# Patient Record
Sex: MALE | Race: WHITE | NOT HISPANIC OR LATINO | Employment: FULL TIME | URBAN - NONMETROPOLITAN AREA
[De-identification: names, ages, dates, MRNs, and addresses within clinical notes are randomized per-mention and may not be internally consistent; named-entity substitution may affect disease eponyms.]

---

## 2017-01-09 ENCOUNTER — OFFICE VISIT (OUTPATIENT)
Dept: FAMILY MEDICINE CLINIC | Facility: CLINIC | Age: 42
End: 2017-01-09

## 2017-01-09 VITALS
RESPIRATION RATE: 16 BRPM | DIASTOLIC BLOOD PRESSURE: 86 MMHG | BODY MASS INDEX: 37 KG/M2 | WEIGHT: 236.25 LBS | HEART RATE: 73 BPM | SYSTOLIC BLOOD PRESSURE: 134 MMHG | OXYGEN SATURATION: 95 % | TEMPERATURE: 98.5 F

## 2017-01-09 DIAGNOSIS — R74.01 ELEVATED ALANINE AMINOTRANSFERASE (ALT) LEVEL: ICD-10-CM

## 2017-01-09 DIAGNOSIS — I10 BENIGN ESSENTIAL HYPERTENSION: Primary | ICD-10-CM

## 2017-01-09 DIAGNOSIS — E78.2 MIXED HYPERLIPIDEMIA: ICD-10-CM

## 2017-01-09 PROCEDURE — 99213 OFFICE O/P EST LOW 20 MIN: CPT | Performed by: INTERNAL MEDICINE

## 2017-01-09 NOTE — PROGRESS NOTES
Subjective   Zachery Lomeli is a 41 y.o. male.     Chief Complaint   Patient presents with   • Hypertension   • Hyperlipidemia       History of Present Illness   Patient is here to follow-up on his blood pressure.  His been taking medication as prescribed.  Patient Is also to follow-up on his cholesterol.  He had labwork done.  He also needs refills on his medications    The following portions of the patient's history were reviewed and updated as appropriate: allergies, current medications, past family history, past medical history, past social history, past surgical history and problem list.    Review of Systems   Constitutional: Negative for appetite change, fatigue and fever.   HENT: Negative for congestion, ear discharge, ear pain, sinus pressure and sore throat.    Eyes: Negative for pain and discharge.   Respiratory: Negative for cough, shortness of breath and wheezing.    Cardiovascular: Negative for chest pain, palpitations and leg swelling.   Gastrointestinal: Negative for abdominal pain, constipation, diarrhea, nausea and vomiting.   Endocrine: Negative for cold intolerance and heat intolerance.   Genitourinary: Negative for dysuria and flank pain.   Musculoskeletal: Negative for arthralgias and joint swelling.   Skin: Negative for pallor and rash.   Allergic/Immunologic: Negative for environmental allergies and food allergies.   Neurological: Negative for dizziness, weakness and numbness.   Hematological: Negative for adenopathy. Does not bruise/bleed easily.   Psychiatric/Behavioral: Negative for behavioral problems and dysphoric mood. The patient is not nervous/anxious.          Current Outpatient Prescriptions:   •  Cholecalciferol (VITAMIN D-3) 1000 UNITS capsule, Take  by mouth Daily., Disp: , Rfl:   •  hydrochlorothiazide (HYDRODIURIL) 25 MG tablet, Take 1 tablet by mouth Daily., Disp: 90 tablet, Rfl: 3  •  metoprolol succinate XL (TOPROL-XL) 100 MG 24 hr tablet, Take 1 tablet by mouth Daily., Disp: 90  tablet, Rfl: 3    Objective     Blood pressure 134/86, pulse 73, temperature 98.5 °F (36.9 °C), temperature source Oral, resp. rate 16, weight 236 lb 4 oz (107 kg), SpO2 95 %.    Physical Exam   Constitutional: He is oriented to person, place, and time. He appears well-developed and well-nourished. No distress.   HENT:   Head: Normocephalic and atraumatic.   Right Ear: External ear normal.   Left Ear: External ear normal.   Nose: Nose normal.   Mouth/Throat: Oropharynx is clear and moist.   Eyes: Conjunctivae and EOM are normal. Pupils are equal, round, and reactive to light.   Neck: Normal range of motion. Neck supple. No thyromegaly present.   Cardiovascular: Normal rate, regular rhythm and normal heart sounds.    Pulmonary/Chest: Effort normal. No respiratory distress.   Abdominal: Soft. He exhibits no distension. There is no tenderness. There is no guarding.   Musculoskeletal: Normal range of motion. He exhibits no edema.   Lymphadenopathy:     He has no cervical adenopathy.   Neurological: He is alert and oriented to person, place, and time.   No gross motor or sensory deficits   Skin: Skin is warm and dry. He is not diaphoretic. No erythema.   Psychiatric: He has a normal mood and affect.   Nursing note and vitals reviewed.      Results for orders placed or performed during the hospital encounter of 03/25/16   Vitamin D 25 hydroxy   Result Value Ref Range    25 Hydroxy, Vitamin D 20.6 ng/mL   TSH   Result Value Ref Range    TSH 1.469 0.350 - 5.350 UIU/mL   Lipid panel   Result Value Ref Range    Fasting? Yes - Y     Total Cholesterol 166 0 - 200 mg/dL    Triglycerides 120 0 - 150 mg/dL    HDL Cholesterol 41 40 - 60 mg/dL    LDL Cholesterol  121 0 - 130 mg/dL   Comprehensive metabolic panel   Result Value Ref Range    Glucose 96 70 - 100 mg/dL    BUN 18 9 - 23 mg/dL    Creatinine 1.1 0.6 - 1.3 mg/dL    Sodium 142 132 - 146 mmol/L    Potassium 4.2 3.5 - 5.5 mmol/L    Chloride 106 99 - 109 mmol/L    CO2 32 (H) 20  - 31 mmol/L    Calcium 9.3 8.7 - 10.4 mg/dL    Alkaline Phosphatase 62 25 - 100 Units/L    AST (SGOT) 28 0 - 33 Units/L    ALT (SGPT) 48 (H) 7 - 40 Units/L    Total Bilirubin 0.6 0.3 - 1.2 mg/dL    Total Protein 6.7 5.7 - 8.2 g/dL    Albumin 4.3 3.2 - 4.8 g/dL    eGFR 79 ml/min/1.732    Anion Gap 4 3 - 11 mmol/L   CK   Result Value Ref Range    Creatine Kinase 92 26 - 174 Units/L   Hepatitis panel, acute   Result Value Ref Range    Hepatitis B Surface Ag NonReactive NonReactive    Hep A IgM NonReactive NonReactive    Hep B Core IgM NonReactive NonReactive    Hep C Virus Ab NonReactive NonReactive   CBC and Differential   Result Value Ref Range    WBC 8.25 3.50 - 10.80 K/mcL    RBC 4.95 4.20 - 5.76 M/mcL    Hemoglobin 14.8 13.1 - 17.5 g/dL    Hematocrit 42.6 38.9 - 50.9 %    MCV 86.1 80.0 - 99.0 fL    MCH 29.9 27.0 - 31.0 pg    MCHC 34.7 32.0 - 36.0 g/dL    RDW-CV 12.4 11.3 - 14.5 %    Platelets 271 150 - 450 K/mcL    Neutrophils Absolute 4.35 1.50 - 8.30 K/mcL    Lymphocytes Absolute 2.82 0.60 - 4.80 K/mcL    Monocytes Absolute 0.71 0.00 - 1.00 K/mcL    Eosinophils Absolute 0.32 (H) 0.10 - 0.30 K/mcL    Basophils Absolute 0.03 0.00 - 0.20 K/mcL    Neutrophil Rel % 52.7 41.0 - 71.0 %    Lymphocyte Rel % 34.2 24.0 - 44.0 %    Monocyte Rel % 8.6 0.0 - 12.0 %    Eosinophil Rel % 3.9 (H) 0.0 - 3.0 %    Basophil Rel % 0.4 0.0 - 1.0 %    Immature Granulocyte Rel % 0.2 0.0 - 0.6 %         Assessment/Plan   Zachery was seen today for hypertension and hyperlipidemia.    Diagnoses and all orders for this visit:    Benign essential hypertension  -     CBC & Differential; Standing    Mixed hyperlipidemia  -     Comprehensive Metabolic Panel; Standing  -     Lipid Panel; Standing    Elevated alanine aminotransferase (ALT) level      Plan:  1.  Benign essential hypertension: Will monitor vitals, patient advised to follow low-sodium diet, will continue current medication  2.  Mixed hyperlipidemia: Diet and excise counseled, will  continue current medication, will obtain CMP and lipid panel prior to return appointment , labs reviewed ,ldl 121  3. Elevated lft: monitor cmp , will repeat , diet counseled         Keila Bolivar MD

## 2017-01-09 NOTE — MR AVS SNAPSHOT
Zachery Lomeli   1/9/2017 3:30 PM   Office Visit    Dept Phone:  [phone]   Encounter #:  [id number]    Provider:  Keila Bolivar MD   Department:  [facility] PRIMARY CARE                Your Full Care Plan              Today's Medication Changes          These changes are accurate as of: 1/9/17  4:04 PM.  If you have any questions, ask your nurse or doctor.               Stop taking medication(s)listed here:     MethylPREDNISolone 4 MG tablet   Commonly known as:  MEDROL ([place])   Stopped by:  Keila Bolivar MD                      Your Updated Medication List          This list is accurate as of: 1/9/17  4:04 PM.  Always use your most recent med list.                hydrochlorothiazide 25 MG tablet   Commonly known as:  HYDRODIURIL   Take 1 tablet by mouth Daily.       metoprolol succinate  MG 24 hr tablet   Commonly known as:  TOPROL-XL   Take 1 tablet by mouth Daily.       Vitamin D-3 1000 UNITS capsule               You Were Diagnosed With        Codes Comments    Benign essential hypertension    -  Primary ICD-10-CM: I10  ICD-9-CM: 401.1     Mixed hyperlipidemia     ICD-10-CM: E78.2  ICD-9-CM: 272.2     Elevated alanine aminotransferase (ALT) level     ICD-10-CM: R74.0  ICD-9-CM: 790.4       Instructions     None    Patient Instructions History      Upcoming Appointments     Visit Type Date Time Department    PHYSICAL 1/9/2017  3:30 PM [facility]    OFFICE VISIT 5/10/2017  2:00 PM [facility]      MyChart Signup     Our records indicate that your [employer] account has been deactivated. If you would like to reactivate your account, please email [email or web address] or call [phone] to talk to our [employer] staff.             Other Info from Your Visit           Your Appointments     May 10, 2017  2:00 PM EDT   Office Visit with Keila Bolivar MD   [facility] PRIMARY CARE (--)    [address] [place] [address]  [address]  [facility] [address] [phone]           Arrive 15 minutes prior to appointment.              Allergies     No Known Drug Allergy        Reason for Visit     Hypertension           Vital Signs     Blood Pressure Pulse Temperature Respirations Weight Oxygen Saturation    134/86 73 98.5 °F (36.9 °C) (Oral) 16 236 lb 4 oz (107 kg) 95%    Body Mass Index                   37 kg/m2           Problems and Diagnoses Noted     Benign essential hypertension    -  Primary    Mixed hyperlipidemia        Elevated alanine aminotransferase (ALT) level

## 2017-01-16 ENCOUNTER — LAB (OUTPATIENT)
Dept: FAMILY MEDICINE CLINIC | Facility: CLINIC | Age: 42
End: 2017-01-16

## 2017-01-16 DIAGNOSIS — E78.2 MIXED HYPERLIPIDEMIA: ICD-10-CM

## 2017-01-16 DIAGNOSIS — I10 BENIGN ESSENTIAL HYPERTENSION: ICD-10-CM

## 2017-01-16 LAB
ALBUMIN SERPL-MCNC: 4.5 G/DL (ref 3.2–4.8)
ALBUMIN/GLOB SERPL: 2 G/DL (ref 1.5–2.5)
ALP SERPL-CCNC: 57 U/L (ref 25–100)
ALT SERPL W P-5'-P-CCNC: 45 U/L (ref 7–40)
ANION GAP SERPL CALCULATED.3IONS-SCNC: 11 MMOL/L (ref 3–11)
ARTICHOKE IGE QN: 126 MG/DL (ref 0–130)
AST SERPL-CCNC: 24 U/L (ref 0–33)
BASOPHILS # BLD AUTO: 0.05 10*3/MM3 (ref 0–0.2)
BASOPHILS NFR BLD AUTO: 0.5 % (ref 0–1)
BILIRUB SERPL-MCNC: 0.5 MG/DL (ref 0.3–1.2)
BUN BLD-MCNC: 15 MG/DL (ref 9–23)
BUN/CREAT SERPL: 13.6 (ref 7–25)
CALCIUM SPEC-SCNC: 10.3 MG/DL (ref 8.7–10.4)
CHLORIDE SERPL-SCNC: 105 MMOL/L (ref 99–109)
CHOLEST SERPL-MCNC: 189 MG/DL (ref 0–200)
CO2 SERPL-SCNC: 33 MMOL/L (ref 20–31)
CREAT BLD-MCNC: 1.1 MG/DL (ref 0.6–1.3)
DEPRECATED RDW RBC AUTO: 40 FL (ref 37–54)
EOSINOPHIL # BLD AUTO: 0.44 10*3/MM3 (ref 0.1–0.3)
EOSINOPHIL NFR BLD AUTO: 4.8 % (ref 0–3)
ERYTHROCYTE [DISTWIDTH] IN BLOOD BY AUTOMATED COUNT: 12.6 % (ref 11.3–14.5)
GFR SERPL CREATININE-BSD FRML MDRD: 74 ML/MIN/1.73
GLOBULIN UR ELPH-MCNC: 2.3 GM/DL
GLUCOSE BLD-MCNC: 122 MG/DL (ref 70–100)
HCT VFR BLD AUTO: 44.2 % (ref 38.9–50.9)
HDLC SERPL-MCNC: 46 MG/DL (ref 40–60)
HGB BLD-MCNC: 15.1 G/DL (ref 13.1–17.5)
IMM GRANULOCYTES # BLD: 0.03 10*3/MM3 (ref 0–0.03)
IMM GRANULOCYTES NFR BLD: 0.3 % (ref 0–0.6)
LYMPHOCYTES # BLD AUTO: 3.23 10*3/MM3 (ref 0.6–4.8)
LYMPHOCYTES NFR BLD AUTO: 35.3 % (ref 24–44)
MCH RBC QN AUTO: 30.1 PG (ref 27–31)
MCHC RBC AUTO-ENTMCNC: 34.2 G/DL (ref 32–36)
MCV RBC AUTO: 88.2 FL (ref 80–99)
MONOCYTES # BLD AUTO: 0.66 10*3/MM3 (ref 0–1)
MONOCYTES NFR BLD AUTO: 7.2 % (ref 0–12)
NEUTROPHILS # BLD AUTO: 4.74 10*3/MM3 (ref 1.5–8.3)
NEUTROPHILS NFR BLD AUTO: 51.9 % (ref 41–71)
PLATELET # BLD AUTO: 282 10*3/MM3 (ref 150–450)
PMV BLD AUTO: 10.2 FL (ref 6–12)
POTASSIUM BLD-SCNC: 4.7 MMOL/L (ref 3.5–5.5)
PROT SERPL-MCNC: 6.8 G/DL (ref 5.7–8.2)
RBC # BLD AUTO: 5.01 10*6/MM3 (ref 4.2–5.76)
SODIUM BLD-SCNC: 149 MMOL/L (ref 132–146)
TRIGL SERPL-MCNC: 112 MG/DL (ref 0–150)
WBC NRBC COR # BLD: 9.15 10*3/MM3 (ref 3.5–10.8)

## 2017-01-16 PROCEDURE — 85025 COMPLETE CBC W/AUTO DIFF WBC: CPT | Performed by: INTERNAL MEDICINE

## 2017-01-16 PROCEDURE — 80061 LIPID PANEL: CPT | Performed by: INTERNAL MEDICINE

## 2017-01-16 PROCEDURE — 80053 COMPREHEN METABOLIC PANEL: CPT | Performed by: INTERNAL MEDICINE

## 2017-01-16 NOTE — MR AVS SNAPSHOT
Zachery Lomeli   1/16/2017 8:05 AM   Lab    Dept Phone:  [phone]   Encounter #:  [id number]    Provider:  [facility] CODIE MESA   Department:  [facility] PRIMARY CARE                Your Full Care Plan              Your Updated Medication List          This list is accurate as of: 1/16/17  8:17 AM.  Always use your most recent med list.                hydrochlorothiazide 25 MG tablet   Commonly known as:  HYDRODIURIL   Take 1 tablet by mouth Daily.       metoprolol succinate  MG 24 hr tablet   Commonly known as:  TOPROL-XL   Take 1 tablet by mouth Daily.       Vitamin D-3 1000 UNITS capsule               We Performed the Following     CBC & Differential     CBC Auto Differential     Comprehensive Metabolic Panel     Lipid Panel       You Were Diagnosed With        Codes Comments    Benign essential hypertension     ICD-10-CM: I10  ICD-9-CM: 401.1     Mixed hyperlipidemia     ICD-10-CM: E78.2  ICD-9-CM: 272.2       Instructions     None    Patient Instructions History      Upcoming Appointments     Visit Type Date Time Department    LAB 1/16/2017  8:05 AM [facility]    OFFICE VISIT 5/10/2017  2:00 PM [facility]      MyChart Signup     Our records indicate that your [facility] [employer] account has been deactivated. If you would like to reactivate your account, please email [email or web address] or call [phone] to talk to our [employer] staff.             Other Info from Your Visit           Your Appointments     May 10, 2017  2:00 PM EDT   Office Visit with Keila Bolivar MD   [facility] PRIMARY CARE (--)    [address] [address] [phone]           Arrive 15 minutes prior to appointment.              Allergies     No Known Drug Allergy        Problems and Diagnoses Noted     Benign essential hypertension        Mixed hyperlipidemia          Results

## 2017-05-10 ENCOUNTER — OFFICE VISIT (OUTPATIENT)
Dept: FAMILY MEDICINE CLINIC | Facility: CLINIC | Age: 42
End: 2017-05-10

## 2017-05-10 VITALS
SYSTOLIC BLOOD PRESSURE: 149 MMHG | BODY MASS INDEX: 36.96 KG/M2 | WEIGHT: 236 LBS | HEART RATE: 68 BPM | RESPIRATION RATE: 16 BRPM | OXYGEN SATURATION: 96 % | DIASTOLIC BLOOD PRESSURE: 71 MMHG | TEMPERATURE: 98.6 F

## 2017-05-10 DIAGNOSIS — E78.00 PURE HYPERCHOLESTEROLEMIA: ICD-10-CM

## 2017-05-10 DIAGNOSIS — R73.01 IMPAIRED FASTING GLUCOSE: ICD-10-CM

## 2017-05-10 DIAGNOSIS — M79.2 NEURALGIA: ICD-10-CM

## 2017-05-10 DIAGNOSIS — I10 BENIGN ESSENTIAL HYPERTENSION: Primary | ICD-10-CM

## 2017-05-10 PROCEDURE — 99214 OFFICE O/P EST MOD 30 MIN: CPT | Performed by: INTERNAL MEDICINE

## 2017-05-10 RX ORDER — HYDROCHLOROTHIAZIDE 25 MG/1
25 TABLET ORAL DAILY
Qty: 90 TABLET | Refills: 3 | Status: SHIPPED | OUTPATIENT
Start: 2017-05-10 | End: 2018-04-03 | Stop reason: SDUPTHER

## 2017-05-10 RX ORDER — LANOLIN ALCOHOL/MO/W.PET/CERES
1000 CREAM (GRAM) TOPICAL DAILY
COMMUNITY

## 2017-05-10 RX ORDER — METOPROLOL SUCCINATE 100 MG/1
100 TABLET, EXTENDED RELEASE ORAL DAILY
Qty: 90 TABLET | Refills: 3 | Status: SHIPPED | OUTPATIENT
Start: 2017-05-10 | End: 2018-04-03 | Stop reason: SDUPTHER

## 2017-05-15 ENCOUNTER — LAB (OUTPATIENT)
Dept: FAMILY MEDICINE CLINIC | Facility: CLINIC | Age: 42
End: 2017-05-15

## 2017-05-15 DIAGNOSIS — I10 BENIGN ESSENTIAL HYPERTENSION: ICD-10-CM

## 2017-05-15 DIAGNOSIS — E78.2 MIXED HYPERLIPIDEMIA: ICD-10-CM

## 2017-05-16 LAB
ALBUMIN SERPL-MCNC: 4.8 G/DL (ref 3.5–5)
ALBUMIN/GLOB SERPL: 1.7 G/DL (ref 1–2)
ALP SERPL-CCNC: 70 U/L (ref 38–126)
ALT SERPL-CCNC: 70 U/L (ref 13–69)
AST SERPL-CCNC: 32 U/L (ref 15–46)
BILIRUB SERPL-MCNC: 0.9 MG/DL (ref 0.2–1.3)
BUN SERPL-MCNC: 15 MG/DL (ref 7–20)
BUN/CREAT SERPL: 15 (ref 6.3–21.9)
CALCIUM SERPL-MCNC: 10.1 MG/DL (ref 8.4–10.2)
CHLORIDE SERPL-SCNC: 103 MMOL/L (ref 98–107)
CHOLEST SERPL-MCNC: 186 MG/DL (ref 0–199)
CO2 SERPL-SCNC: 29 MMOL/L (ref 26–30)
CREAT SERPL-MCNC: 1 MG/DL (ref 0.6–1.3)
GLOBULIN SER CALC-MCNC: 2.8 GM/DL
GLUCOSE SERPL-MCNC: 108 MG/DL (ref 74–98)
HCT VFR BLD AUTO: (no result) %
HDLC SERPL-MCNC: 43 MG/DL (ref 40–60)
HGB BLD-MCNC: (no result) G/DL
LDLC SERPL CALC-MCNC: 118 MG/DL (ref 0–99)
PLATELET # BLD AUTO: (no result) 10*3/UL
POTASSIUM SERPL-SCNC: 4.3 MMOL/L (ref 3.5–5.1)
PROT SERPL-MCNC: 7.6 G/DL (ref 6.3–8.2)
RBC # BLD AUTO: (no result) 10*6/UL
REQUEST PROBLEM: NORMAL
SODIUM SERPL-SCNC: 143 MMOL/L (ref 137–145)
TRIGL SERPL-MCNC: 127 MG/DL
VLDLC SERPL CALC-MCNC: 25.4 MG/DL
WBC # BLD AUTO: (no result) 10*3/MM3

## 2017-11-13 ENCOUNTER — OFFICE VISIT (OUTPATIENT)
Dept: FAMILY MEDICINE CLINIC | Facility: CLINIC | Age: 42
End: 2017-11-13

## 2017-11-13 VITALS
DIASTOLIC BLOOD PRESSURE: 84 MMHG | BODY MASS INDEX: 36.02 KG/M2 | SYSTOLIC BLOOD PRESSURE: 131 MMHG | HEART RATE: 80 BPM | OXYGEN SATURATION: 99 % | RESPIRATION RATE: 16 BRPM | TEMPERATURE: 98.9 F | WEIGHT: 230 LBS

## 2017-11-13 DIAGNOSIS — R74.8 ELEVATED LIVER ENZYMES: ICD-10-CM

## 2017-11-13 DIAGNOSIS — E78.00 PURE HYPERCHOLESTEROLEMIA: ICD-10-CM

## 2017-11-13 DIAGNOSIS — I10 BENIGN ESSENTIAL HYPERTENSION: Primary | ICD-10-CM

## 2017-11-13 PROCEDURE — 99214 OFFICE O/P EST MOD 30 MIN: CPT | Performed by: INTERNAL MEDICINE

## 2017-11-13 NOTE — PROGRESS NOTES
Subjective   Zachery Lomeli is a 42 y.o. male.     Chief Complaint   Patient presents with   • Hyperlipidemia   • Hypertension       History of Present Illness   Patient is here to follow-up on his blood pressure.  His been taking medication as prescribed.  Patient Is also to follow-up on his cholesterol.  He had labwork done.  He also needs refills on his medications    The following portions of the patient's history were reviewed and updated as appropriate: allergies, current medications, past family history, past medical history, past social history, past surgical history and problem list.    Review of Systems   Constitutional: Negative for appetite change, fatigue and fever.   HENT: Negative for congestion, ear discharge, ear pain, sinus pressure and sore throat.    Eyes: Negative for pain and discharge.   Respiratory: Negative for cough, shortness of breath and wheezing.    Cardiovascular: Negative for chest pain, palpitations and leg swelling.   Gastrointestinal: Negative for abdominal pain, constipation, diarrhea, nausea and vomiting.   Endocrine: Negative for cold intolerance and heat intolerance.   Genitourinary: Negative for dysuria and flank pain.   Musculoskeletal: Negative for arthralgias and joint swelling.   Skin: Negative for pallor and rash.   Allergic/Immunologic: Negative for environmental allergies and food allergies.   Neurological: Negative for dizziness, weakness and numbness.   Hematological: Negative for adenopathy. Does not bruise/bleed easily.   Psychiatric/Behavioral: Negative for behavioral problems and dysphoric mood. The patient is not nervous/anxious.          Current Outpatient Prescriptions:   •  Cholecalciferol (VITAMIN D-3) 1000 UNITS capsule, Take  by mouth Daily., Disp: , Rfl:   •  hydrochlorothiazide (HYDRODIURIL) 25 MG tablet, Take 1 tablet by mouth Daily., Disp: 90 tablet, Rfl: 3  •  metoprolol succinate XL (TOPROL-XL) 100 MG 24 hr tablet, Take 1 tablet by mouth Daily., Disp: 90  tablet, Rfl: 3  •  vitamin B-12 (CYANOCOBALAMIN) 1000 MCG tablet, Take 1,000 mcg by mouth Daily., Disp: , Rfl:     Objective     Blood pressure 131/84, pulse 80, temperature 98.9 °F (37.2 °C), temperature source Oral, resp. rate 16, weight 230 lb (104 kg), SpO2 99 %.    Physical Exam   Constitutional: He is oriented to person, place, and time. He appears well-developed and well-nourished. No distress.   HENT:   Head: Normocephalic and atraumatic.   Right Ear: External ear normal.   Left Ear: External ear normal.   Nose: Nose normal.   Mouth/Throat: Oropharynx is clear and moist.   Eyes: Conjunctivae and EOM are normal. Pupils are equal, round, and reactive to light.   Neck: Normal range of motion. Neck supple. No thyromegaly present.   Cardiovascular: Normal rate, regular rhythm and normal heart sounds.    Pulmonary/Chest: Effort normal and breath sounds normal. No respiratory distress. He has no wheezes.   Abdominal: Soft. Bowel sounds are normal. He exhibits no distension. There is no tenderness. There is no guarding.   Musculoskeletal: Normal range of motion. He exhibits no edema.   Lymphadenopathy:     He has no cervical adenopathy.   Neurological: He is alert and oriented to person, place, and time.   No gross  motor or sensory deficits   Skin: Skin is warm. He is not diaphoretic. No erythema.   Psychiatric: He has a normal mood and affect.   Nursing note and vitals reviewed.      Results for orders placed or performed in visit on 05/15/17   Comprehensive Metabolic Panel   Result Value Ref Range    Glucose 108 (H) 74 - 98 mg/dL    BUN 15 7 - 20 mg/dL    Creatinine 1.00 0.60 - 1.30 mg/dL    eGFR Non African Am 82 >60 mL/min/1.73    eGFR African Am 100 >60 mL/min/1.73    BUN/Creatinine Ratio 15.0 6.3 - 21.9    Sodium 143 137 - 145 mmol/L    Potassium 4.3 3.5 - 5.1 mmol/L    Chloride 103 98 - 107 mmol/L    Total CO2 29.0 26.0 - 30.0 mmol/L    Calcium 10.1 8.4 - 10.2 mg/dL    Total Protein 7.6 6.3 - 8.2 g/dL     Albumin 4.80 3.50 - 5.00 g/dL    Globulin 2.8 gm/dL    A/G Ratio 1.7 1.0 - 2.0 g/dL    Total Bilirubin 0.9 0.2 - 1.3 mg/dL    Alkaline Phosphatase 70 38 - 126 U/L    AST (SGOT) 32 15 - 46 U/L    ALT (SGPT) 70 (H) 13 - 69 U/L   Lipid Panel   Result Value Ref Range    Total Cholesterol 186 0 - 199 mg/dL    Triglycerides 127 <150 mg/dL    HDL Cholesterol 43 40 - 60 mg/dL    VLDL Cholesterol 25.4 mg/dL    LDL Cholesterol  118 (H) 0 - 99 mg/dL   Request Problem   Result Value Ref Range    Request Problem CANCELED    CBC & Differential   Result Value Ref Range    WBC CANCELED 10*3/mm3    RBC CANCELED     Hemoglobin CANCELED     Hematocrit CANCELED     Platelets CANCELED          Assessment/Plan   Zachery was seen today for hyperlipidemia and hypertension.    Diagnoses and all orders for this visit:    Benign essential hypertension    Pure hypercholesterolemia    Elevated liver enzymes  -     US Liver        Plan:  1.  Benign essential hypertension: Will monitor vitals, patient advised to follow low-sodium diet, will continue current medication  2.  Mixed hyperlipidemia: Diet and excise counseled, will continue current medication, will obtain CMP and lipid panel prior to return appointment , labs reviewed ,ldl 118  3. Elevated lft: monitor cmp , will repeat  cmp , negative hepatitis panel , will get usg of liver , diet counseled        Keila Bolivar MD

## 2017-11-20 ENCOUNTER — HOSPITAL ENCOUNTER (OUTPATIENT)
Dept: ULTRASOUND IMAGING | Facility: HOSPITAL | Age: 42
Discharge: HOME | End: 2017-11-20
Admitting: INTERNAL MEDICINE

## 2017-11-20 PROCEDURE — 76705 ECHO EXAM OF ABDOMEN: CPT

## 2017-11-28 ENCOUNTER — PATIENT MESSAGE (OUTPATIENT)
Dept: FAMILY MEDICINE CLINIC | Facility: CLINIC | Age: 42
End: 2017-11-28

## 2018-03-30 DIAGNOSIS — Z00.00 HEALTH MAINTENANCE EXAMINATION: Primary | ICD-10-CM

## 2018-03-30 LAB
ALBUMIN SERPL-MCNC: 4.3 G/DL (ref 3.5–5)
ALBUMIN/GLOB SERPL: 1.7 G/DL (ref 1–2)
ALP SERPL-CCNC: 62 U/L (ref 38–126)
ALT SERPL-CCNC: 61 U/L (ref 13–69)
AST SERPL-CCNC: 31 U/L (ref 15–46)
BILIRUB SERPL-MCNC: 0.6 MG/DL (ref 0.2–1.3)
BUN SERPL-MCNC: 16 MG/DL (ref 7–20)
BUN/CREAT SERPL: 16 (ref 6.3–21.9)
CALCIUM SERPL-MCNC: 10.1 MG/DL (ref 8.4–10.2)
CHLORIDE SERPL-SCNC: 104 MMOL/L (ref 98–107)
CHOLEST SERPL-MCNC: 172 MG/DL (ref 0–199)
CO2 SERPL-SCNC: 28 MMOL/L (ref 26–30)
CREAT SERPL-MCNC: 1 MG/DL (ref 0.6–1.3)
ERYTHROCYTE [DISTWIDTH] IN BLOOD BY AUTOMATED COUNT: 12.8 % (ref 11.5–14.5)
GFR SERPLBLD CREATININE-BSD FMLA CKD-EPI: 82 ML/MIN/1.73
GFR SERPLBLD CREATININE-BSD FMLA CKD-EPI: 99 ML/MIN/1.73
GLOBULIN SER CALC-MCNC: 2.6 GM/DL
GLUCOSE SERPL-MCNC: 102 MG/DL (ref 74–98)
HBA1C MFR BLD: 5.8 %
HCT VFR BLD AUTO: 45.2 % (ref 42–52)
HDLC SERPL-MCNC: 44 MG/DL (ref 40–60)
HGB BLD-MCNC: 15.4 G/DL (ref 14–18)
LDLC SERPL CALC-MCNC: 104 MG/DL (ref 0–99)
MCH RBC QN AUTO: 29.9 PG (ref 27–31)
MCHC RBC AUTO-ENTMCNC: 34.1 G/DL (ref 30–37)
MCV RBC AUTO: 87.8 FL (ref 80–94)
PLATELET # BLD AUTO: 281 10*3/MM3 (ref 130–400)
POTASSIUM SERPL-SCNC: 4.3 MMOL/L (ref 3.5–5.1)
PROT SERPL-MCNC: 6.9 G/DL (ref 6.3–8.2)
RBC # BLD AUTO: 5.15 10*6/MM3 (ref 4.7–6.1)
SODIUM SERPL-SCNC: 146 MMOL/L (ref 137–145)
TRIGL SERPL-MCNC: 120 MG/DL
TSH SERPL DL<=0.005 MIU/L-ACNC: 2.45 MIU/ML (ref 0.47–4.68)
VLDLC SERPL CALC-MCNC: 24 MG/DL
WBC # BLD AUTO: 9.77 10*3/MM3 (ref 4.8–10.8)

## 2018-04-03 ENCOUNTER — OFFICE VISIT (OUTPATIENT)
Dept: INTERNAL MEDICINE | Facility: CLINIC | Age: 43
End: 2018-04-03

## 2018-04-03 ENCOUNTER — HOSPITAL ENCOUNTER (OUTPATIENT)
Dept: GENERAL RADIOLOGY | Facility: HOSPITAL | Age: 43
Discharge: HOME | End: 2018-04-03
Admitting: INTERNAL MEDICINE

## 2018-04-03 VITALS
OXYGEN SATURATION: 97 % | BODY MASS INDEX: 37.04 KG/M2 | WEIGHT: 236 LBS | HEIGHT: 67 IN | SYSTOLIC BLOOD PRESSURE: 130 MMHG | HEART RATE: 83 BPM | DIASTOLIC BLOOD PRESSURE: 90 MMHG | TEMPERATURE: 99.5 F

## 2018-04-03 DIAGNOSIS — J20.8 ACUTE BRONCHITIS DUE TO OTHER SPECIFIED ORGANISMS: ICD-10-CM

## 2018-04-03 DIAGNOSIS — R50.9 FEVER, UNSPECIFIED FEVER CAUSE: ICD-10-CM

## 2018-04-03 DIAGNOSIS — E78.2 MIXED HYPERLIPIDEMIA: ICD-10-CM

## 2018-04-03 DIAGNOSIS — I10 BENIGN ESSENTIAL HYPERTENSION: Primary | ICD-10-CM

## 2018-04-03 DIAGNOSIS — J10.1 INFLUENZA A: ICD-10-CM

## 2018-04-03 DIAGNOSIS — R73.01 IMPAIRED FASTING GLUCOSE: ICD-10-CM

## 2018-04-03 LAB
EXPIRATION DATE: ABNORMAL
FLUAV AG NPH QL: ABNORMAL
FLUBV AG NPH QL: ABNORMAL
INTERNAL CONTROL: ABNORMAL
Lab: ABNORMAL

## 2018-04-03 PROCEDURE — 71046 X-RAY EXAM CHEST 2 VIEWS: CPT

## 2018-04-03 PROCEDURE — 87804 INFLUENZA ASSAY W/OPTIC: CPT | Performed by: INTERNAL MEDICINE

## 2018-04-03 PROCEDURE — 99214 OFFICE O/P EST MOD 30 MIN: CPT | Performed by: INTERNAL MEDICINE

## 2018-04-03 RX ORDER — METOPROLOL SUCCINATE 100 MG/1
100 TABLET, EXTENDED RELEASE ORAL DAILY
Qty: 90 TABLET | Refills: 3 | Status: SHIPPED | OUTPATIENT
Start: 2018-04-03 | End: 2019-04-16 | Stop reason: SDUPTHER

## 2018-04-03 RX ORDER — AZITHROMYCIN 250 MG/1
TABLET, FILM COATED ORAL
Qty: 6 TABLET | Refills: 0 | Status: SHIPPED | OUTPATIENT
Start: 2018-04-03 | End: 2019-04-16

## 2018-04-03 RX ORDER — HYDROCHLOROTHIAZIDE 25 MG/1
25 TABLET ORAL DAILY
Qty: 90 TABLET | Refills: 3 | Status: SHIPPED | OUTPATIENT
Start: 2018-04-03 | End: 2018-11-12 | Stop reason: SDUPTHER

## 2018-04-03 RX ORDER — OSELTAMIVIR PHOSPHATE 75 MG/1
75 CAPSULE ORAL 2 TIMES DAILY
Qty: 10 CAPSULE | Refills: 0 | Status: SHIPPED | OUTPATIENT
Start: 2018-04-03 | End: 2019-04-16

## 2018-04-03 NOTE — PROGRESS NOTES
Subjective   Zachery Lomeli is a 42 y.o. male.     Chief Complaint   Patient presents with   • Fever         • Cough   • Hypertension   • Hyperlipidemia   • Blood Sugar Problem       History of Present Illness   Patient is here to follow up  on the  blood pressure and cholesterol  The patient  is taking the blood pressure  medications as prescribed and  has had no side effects.   The patient  also needs refills on  medications . The patient  is also here to follow up  on  the  cholesterol  and  is trying to follow a diet. The patient   had lab work done and would like the labs to be reviewed today.  He complains of a fever with cough.  He states he has been having body aches.  He states his wife was sick over the weekend.  Symptoms have been worsening.  He tried over-the-counter medications without much relief in his symptoms    The following portions of the patient's history were reviewed and updated as appropriate: allergies, current medications, past family history, past medical history, past social history, past surgical history and problem list.    Review of Systems   Constitutional: Positive for fever. Negative for appetite change and fatigue.   HENT: Negative for congestion, ear discharge, ear pain, sinus pressure and sore throat.    Eyes: Negative for pain and discharge.   Respiratory: Positive for cough. Negative for shortness of breath and wheezing.    Cardiovascular: Negative for chest pain, palpitations and leg swelling.   Gastrointestinal: Negative for abdominal pain, constipation, diarrhea, nausea and vomiting.   Endocrine: Negative for cold intolerance and heat intolerance.   Genitourinary: Negative for dysuria and flank pain.   Musculoskeletal: Negative for arthralgias and joint swelling.   Skin: Negative for pallor and rash.   Allergic/Immunologic: Negative for environmental allergies and food allergies.   Neurological: Negative for dizziness, weakness and numbness.   Hematological: Negative for  "adenopathy. Does not bruise/bleed easily.   Psychiatric/Behavioral: Negative for behavioral problems and dysphoric mood. The patient is not nervous/anxious.          Current Outpatient Prescriptions:   •  Cholecalciferol (VITAMIN D-3) 1000 UNITS capsule, Take  by mouth Daily., Disp: , Rfl:   •  hydrochlorothiazide (HYDRODIURIL) 25 MG tablet, Take 1 tablet by mouth Daily., Disp: 90 tablet, Rfl: 3  •  metoprolol succinate XL (TOPROL-XL) 100 MG 24 hr tablet, Take 1 tablet by mouth Daily., Disp: 90 tablet, Rfl: 3  •  vitamin B-12 (CYANOCOBALAMIN) 1000 MCG tablet, Take 1,000 mcg by mouth Daily., Disp: , Rfl:   •  azithromycin (ZITHROMAX Z-CAROLINA) 250 MG tablet, Take 2 tablets the first day, then 1 tablet daily for 4 days., Disp: 6 tablet, Rfl: 0  •  oseltamivir (TAMIFLU) 75 MG capsule, Take 1 capsule by mouth 2 (Two) Times a Day., Disp: 10 capsule, Rfl: 0    Objective     Blood pressure 130/90, pulse 83, temperature 99.5 °F (37.5 °C), height 170.2 cm (67\"), weight 107 kg (236 lb), SpO2 97 %.    Physical Exam   Constitutional: He is oriented to person, place, and time. He appears well-developed and well-nourished. No distress.   HENT:   Head: Normocephalic and atraumatic.   Right Ear: External ear normal.   Left Ear: External ear normal.   Nose: Nose normal.   Mouth/Throat: Oropharyngeal exudate present.   Eyes: Conjunctivae and EOM are normal. Pupils are equal, round, and reactive to light.   Neck: Normal range of motion. Neck supple. No thyromegaly present.   Cardiovascular: Normal rate, regular rhythm and normal heart sounds.    Pulmonary/Chest: Effort normal. No respiratory distress.   Abdominal: Soft. He exhibits no distension. There is no tenderness. There is no guarding.   Musculoskeletal: Normal range of motion. He exhibits no edema.   Lymphadenopathy:     He has no cervical adenopathy.   Neurological: He is alert and oriented to person, place, and time.   No gross motor or sensory deficits   Skin: Skin is warm and " dry. He is not diaphoretic. No erythema.   Psychiatric: He has a normal mood and affect.   Nursing note and vitals reviewed.      Results for orders placed or performed in visit on 04/03/18   POC Influenza A / B   Result Value Ref Range    Rapid Influenza A Ag POS (A)     Rapid Influenza B Ag NEG     Internal Control Passed Passed    Lot Number [id number]     Expiration Date 01/28/2021          Assessment/Plan   Zachery was seen today for fever, cough, hypertension, hyperlipidemia and blood sugar problem.    Diagnoses and all orders for this visit:    Benign essential hypertension    Mixed hyperlipidemia  -     CBC & Differential  -     Comprehensive Metabolic Panel  -     Lipid Panel    Impaired fasting glucose  -     Hemoglobin A1c    Fever, unspecified fever cause  -     POC Influenza A / B    Influenza A    Acute bronchitis due to other specified organisms  -     XR Chest PA & Lateral    Other orders  -     oseltamivir (TAMIFLU) 75 MG capsule; Take 1 capsule by mouth 2 (Two) Times a Day.  -     hydrochlorothiazide (HYDRODIURIL) 25 MG tablet; Take 1 tablet by mouth Daily.  -     metoprolol succinate XL (TOPROL-XL) 100 MG 24 hr tablet; Take 1 tablet by mouth Daily.  -     azithromycin (ZITHROMAX Z-CAROLINA) 250 MG tablet; Take 2 tablets the first day, then 1 tablet daily for 4 days.      Plan:  1.  Benign essential hypertension: Will continue current medication, low-sodium diet advised  2.mixed hyperlipidemia: reviewed fasting CMP and lipid panel.  Diet and excise counseled, will continue current medication  3. Impaired glucose   : reviewed cmp and  hba1c  , diet and exercise counseled ,    4.  Fever:Secondary to   5.influenza a: In office flu test positive for influenza A.  We will start Tamiflu .    6.acute bronchitis: We will start Z-[place]           Keila Bolivar MD

## 2018-07-13 RX ORDER — HYDROCHLOROTHIAZIDE 25 MG/1
25 TABLET ORAL DAILY
Qty: 90 TABLET | Refills: 3 | Status: CANCELLED | OUTPATIENT
Start: 2018-07-13

## 2018-07-13 RX ORDER — METOPROLOL SUCCINATE 100 MG/1
100 TABLET, EXTENDED RELEASE ORAL DAILY
Qty: 90 TABLET | Refills: 3 | Status: CANCELLED | OUTPATIENT
Start: 2018-07-13

## 2018-08-22 LAB
ALBUMIN SERPL-MCNC: 4.1 G/DL (ref 3.5–5)
ALBUMIN/GLOB SERPL: 1.6 G/DL (ref 1–2)
ALP SERPL-CCNC: 63 U/L (ref 38–126)
ALT SERPL-CCNC: 39 U/L (ref 13–69)
AST SERPL-CCNC: 29 U/L (ref 15–46)
BASOPHILS # BLD AUTO: 0.06 10*3/MM3 (ref 0–0.2)
BASOPHILS NFR BLD AUTO: 0.6 % (ref 0–2.5)
BILIRUB SERPL-MCNC: 0.6 MG/DL (ref 0.2–1.3)
BUN SERPL-MCNC: 14 MG/DL (ref 7–20)
BUN/CREAT SERPL: 15.6 (ref 6.3–21.9)
CALCIUM SERPL-MCNC: 9.6 MG/DL (ref 8.4–10.2)
CHLORIDE SERPL-SCNC: 104 MMOL/L (ref 98–107)
CHOLEST SERPL-MCNC: 159 MG/DL (ref 0–199)
CO2 SERPL-SCNC: 29 MMOL/L (ref 26–30)
CREAT SERPL-MCNC: 0.9 MG/DL (ref 0.6–1.3)
EOSINOPHIL # BLD AUTO: 0.42 10*3/MM3 (ref 0–0.7)
EOSINOPHIL NFR BLD AUTO: 4.4 % (ref 0–7)
ERYTHROCYTE [DISTWIDTH] IN BLOOD BY AUTOMATED COUNT: 13.1 % (ref 11.5–14.5)
GLOBULIN SER CALC-MCNC: 2.5 GM/DL
GLUCOSE SERPL-MCNC: 103 MG/DL (ref 74–98)
HBA1C MFR BLD: 5.9 %
HCT VFR BLD AUTO: 43.8 % (ref 42–52)
HDLC SERPL-MCNC: 47 MG/DL (ref 40–60)
HGB BLD-MCNC: 14.9 G/DL (ref 14–18)
IMM GRANULOCYTES # BLD: 0.06 10*3/MM3 (ref 0–0.06)
IMM GRANULOCYTES NFR BLD: 0.6 % (ref 0–0.6)
LDLC SERPL CALC-MCNC: 89 MG/DL (ref 0–99)
LYMPHOCYTES # BLD AUTO: 2.91 10*3/MM3 (ref 0.6–3.4)
LYMPHOCYTES NFR BLD AUTO: 30.7 % (ref 10–50)
MCH RBC QN AUTO: 29.9 PG (ref 27–31)
MCHC RBC AUTO-ENTMCNC: 34 G/DL (ref 30–37)
MCV RBC AUTO: 88 FL (ref 80–94)
MONOCYTES # BLD AUTO: 0.74 10*3/MM3 (ref 0–0.9)
MONOCYTES NFR BLD AUTO: 7.8 % (ref 0–12)
NEUTROPHILS # BLD AUTO: 5.28 10*3/MM3 (ref 2–6.9)
NEUTROPHILS NFR BLD AUTO: 55.9 % (ref 37–80)
NRBC BLD AUTO-RTO: 0 /100 WBC (ref 0–0)
PLATELET # BLD AUTO: 277 10*3/MM3 (ref 130–400)
POTASSIUM SERPL-SCNC: 4.1 MMOL/L (ref 3.5–5.1)
PROT SERPL-MCNC: 6.6 G/DL (ref 6.3–8.2)
RBC # BLD AUTO: 4.98 10*6/MM3 (ref 4.7–6.1)
SODIUM SERPL-SCNC: 141 MMOL/L (ref 137–145)
TRIGL SERPL-MCNC: 115 MG/DL
VLDLC SERPL CALC-MCNC: 23 MG/DL
WBC # BLD AUTO: 9.47 10*3/MM3 (ref 4.8–10.8)

## 2018-11-12 RX ORDER — HYDROCHLOROTHIAZIDE 25 MG/1
25 TABLET ORAL DAILY
Qty: 90 TABLET | Refills: 3 | Status: SHIPPED | OUTPATIENT
Start: 2018-11-12 | End: 2019-08-20 | Stop reason: SDUPTHER

## 2018-12-27 ENCOUNTER — TELEPHONE (OUTPATIENT)
Dept: OTHER | Facility: OTHER | Age: 43
End: 2018-12-27

## 2018-12-27 NOTE — TELEPHONE ENCOUNTER
Patients wife called, pt thinks he has a kidney stone, his back was hurting really bad, she said pt was in extreme pain. We do not have any openings until Monday, can pt be called something in or can he be worked in for appt?     Wife--Darcy phone # [phone]

## 2018-12-28 NOTE — TELEPHONE ENCOUNTER
Patients wife stated that patient is feeling better today and is refusing medical treatment so far. I advised patients wife if pain gets worse to take him to the ER over the weekend and if he is still in pain on Monday then he needs to go ahead and be seen in our office.

## 2019-04-16 ENCOUNTER — OFFICE VISIT (OUTPATIENT)
Dept: INTERNAL MEDICINE | Facility: CLINIC | Age: 44
End: 2019-04-16

## 2019-04-16 VITALS
SYSTOLIC BLOOD PRESSURE: 145 MMHG | OXYGEN SATURATION: 97 % | RESPIRATION RATE: 16 BRPM | WEIGHT: 236 LBS | BODY MASS INDEX: 37.04 KG/M2 | HEIGHT: 67 IN | DIASTOLIC BLOOD PRESSURE: 77 MMHG | TEMPERATURE: 98.2 F | HEART RATE: 68 BPM

## 2019-04-16 DIAGNOSIS — I10 BENIGN ESSENTIAL HYPERTENSION: Primary | ICD-10-CM

## 2019-04-16 DIAGNOSIS — K92.1 BLOOD IN STOOL: ICD-10-CM

## 2019-04-16 DIAGNOSIS — E78.2 MIXED HYPERLIPIDEMIA: ICD-10-CM

## 2019-04-16 DIAGNOSIS — R19.7 DIARRHEA, UNSPECIFIED TYPE: ICD-10-CM

## 2019-04-16 DIAGNOSIS — M65.332 TRIGGER MIDDLE FINGER OF LEFT HAND: ICD-10-CM

## 2019-04-16 DIAGNOSIS — K62.89 ANAL OR RECTAL PAIN: ICD-10-CM

## 2019-04-16 DIAGNOSIS — Z23 NEED FOR HEPATITIS VACCINATION: ICD-10-CM

## 2019-04-16 DIAGNOSIS — R73.01 IMPAIRED FASTING GLUCOSE: ICD-10-CM

## 2019-04-16 PROCEDURE — 99214 OFFICE O/P EST MOD 30 MIN: CPT | Performed by: INTERNAL MEDICINE

## 2019-04-16 PROCEDURE — 90632 HEPA VACCINE ADULT IM: CPT | Performed by: INTERNAL MEDICINE

## 2019-04-16 PROCEDURE — 90471 IMMUNIZATION ADMIN: CPT | Performed by: INTERNAL MEDICINE

## 2019-04-16 RX ORDER — METOPROLOL SUCCINATE 100 MG/1
100 TABLET, EXTENDED RELEASE ORAL DAILY
Qty: 90 TABLET | Refills: 1 | Status: SHIPPED | OUTPATIENT
Start: 2019-04-16 | End: 2019-08-20 | Stop reason: SDUPTHER

## 2019-04-16 NOTE — PROGRESS NOTES
Subjective   Zachery Lomeli is a 43 y.o. male.     Chief Complaint   Patient presents with   • Hypertension   • Hyperlipidemia   • Blood Sugar Problem   • Joint Pain   • Diarrhea   • Rectal Bleeding       History of Present Illness   HPI: Patient is here to follow up on the blood pressure  The patient is taking the blood pressure medications as prescribed and has had no side effects. The patient is also here to follow up on the cholesterol and is trying to follow a diet. The patient is   due to get lab work done .  The patient also needs refills on medications .  He quit smoking  ,  He complains of rectal /anal pressure and pain and one night he could sleep due to the pain , he states he noticed blood in stool in the past when he has had diarrhea , he has diarrhea every few weeks but states it is related to what he eats,  He has not had egd or colonoscopy in the past, he complains of Left hand  Pain and trigger finger  And its been there for several months and he can play the guitar , he needs hep a shot  Hyperlipidemia   Pertinent negatives include no chest pain or shortness of breath.   Hypertension   Pertinent negatives include no chest pain, palpitations or shortness of breath.    The following portions of the patient's history were reviewed and updated as appropriate: allergies, current medications, past family history, past medical history, past social history, past surgical history and problem list.    Review of Systems   Constitutional: Negative for appetite change, fatigue and fever.   HENT: Negative for congestion, ear discharge, ear pain, sinus pressure and sore throat.    Eyes: Negative for pain and discharge.   Respiratory: Negative for cough, shortness of breath and wheezing.    Cardiovascular: Negative for chest pain, palpitations and leg swelling.   Gastrointestinal: Positive for blood in stool, diarrhea and rectal pain. Negative for abdominal pain, constipation, nausea and vomiting.   Endocrine:  "Negative for cold intolerance and heat intolerance.   Genitourinary: Negative for dysuria and flank pain.   Musculoskeletal: Positive for arthralgias. Negative for joint swelling.        Left hand    Skin: Negative for pallor and rash.   Allergic/Immunologic: Negative for environmental allergies and food allergies.   Neurological: Negative for dizziness, weakness and numbness.   Hematological: Negative for adenopathy. Does not bruise/bleed easily.   Psychiatric/Behavioral: Negative for behavioral problems and dysphoric mood. The patient is not nervous/anxious.          Current Outpatient Medications:   •  Cholecalciferol (VITAMIN D-3) 1000 UNITS capsule, Take  by mouth Daily., Disp: , Rfl:   •  hydrochlorothiazide (HYDRODIURIL) 25 MG tablet, Take 1 tablet by mouth Daily., Disp: 90 tablet, Rfl: 3  •  metoprolol succinate XL (TOPROL-XL) 100 MG 24 hr tablet, Take 1 tablet by mouth Daily., Disp: 90 tablet, Rfl: 1  •  vitamin B-12 (CYANOCOBALAMIN) 1000 MCG tablet, Take 1,000 mcg by mouth Daily., Disp: , Rfl:     Objective     Blood pressure 145/77, pulse 68, temperature 98.2 °F (36.8 °C), resp. rate 16, height 170.2 cm (67\"), weight 107 kg (236 lb), SpO2 97 %.    Physical Exam   Constitutional: He is oriented to person, place, and time. He appears well-developed and well-nourished. No distress.   HENT:   Head: Normocephalic and atraumatic.   Right Ear: External ear normal.   Left Ear: External ear normal.   Nose: Nose normal.   Mouth/Throat: Oropharynx is clear and moist.   Eyes: Conjunctivae and EOM are normal. Pupils are equal, round, and reactive to light.   Neck: Normal range of motion. Neck supple. No thyromegaly present.   Cardiovascular: Normal rate, regular rhythm and normal heart sounds.   Pulmonary/Chest: Effort normal. No respiratory distress.   Abdominal: Soft. He exhibits no distension. There is no tenderness. There is no guarding.   Musculoskeletal: He exhibits no edema.   Left hand  Trigger finger  "   Lymphadenopathy:     He has no cervical adenopathy.   Neurological: He is alert and oriented to person, place, and time.   No gross motor or sensory deficits   Skin: Skin is warm and dry. He is not diaphoretic. No erythema.   Psychiatric: He has a normal mood and affect.   Nursing note and vitals reviewed.    Patient's Body mass index is 36.96 kg/m². BMI is above normal parameters. Recommendations include: educational material, exercise counseling and nutrition counseling.      Results for orders placed or performed in visit on 04/16/19   Comprehensive Metabolic Panel   Result Value Ref Range    Glucose 103 (H) 74 - 98 mg/dL    BUN 12 7 - 20 mg/dL    Creatinine 1.00 0.60 - 1.30 mg/dL    eGFR Non African Am 82 >60 mL/min/1.73    eGFR African Am 99 >60 mL/min/1.73    BUN/Creatinine Ratio 12.0 6.3 - 21.9    Sodium 141 137 - 145 mmol/L    Potassium 4.1 3.5 - 5.1 mmol/L    Chloride 101 98 - 107 mmol/L    Total CO2 29.0 26.0 - 30.0 mmol/L    Calcium 9.7 8.4 - 10.2 mg/dL    Total Protein 6.7 6.3 - 8.2 g/dL    Albumin 4.40 3.50 - 5.00 g/dL    Globulin 2.3 gm/dL    A/G Ratio 1.9 1.0 - 2.0 g/dL    Total Bilirubin 0.7 0.2 - 1.3 mg/dL    Alkaline Phosphatase 59 38 - 126 U/L    AST (SGOT) 34 15 - 46 U/L    ALT (SGPT) 69 13 - 69 U/L   Lipid Panel   Result Value Ref Range    Total Cholesterol 147 0 - 199 mg/dL    Triglycerides 128 <150 mg/dL    HDL Cholesterol 44 40 - 60 mg/dL    VLDL Cholesterol 25.6 mg/dL    LDL Cholesterol  77 0 - 99 mg/dL   Hemoglobin A1c   Result Value Ref Range    Hemoglobin A1C     CBC & Differential   Result Value Ref Range    WBC 7.71 3.40 - 10.80 10*3/mm3    RBC 4.86 4.14 - 5.80 10*6/mm3    Hemoglobin 14.5 13.0 - 17.7 g/dL    Hematocrit 42.4 37.5 - 51.0 %    MCV 87.2 79.0 - 97.0 fL    MCH 29.8 26.6 - 33.0 pg    MCHC 34.2 31.5 - 35.7 g/dL    RDW 12.9 12.3 - 15.4 %    Platelets 274 140 - 450 10*3/mm3    Neutrophil Rel % 53.0 42.7 - 76.0 %    Lymphocyte Rel % 36.3 19.6 - 45.3 %    Monocyte Rel % 7.7 5.0 -  12.0 %    Eosinophil Rel % 2.1 0.3 - 6.2 %    Basophil Rel % 0.6 0.0 - 1.5 %    Neutrophils Absolute 4.09 1.70 - 7.00 10*3/mm3    Lymphocytes Absolute 2.80 0.70 - 3.10 10*3/mm3    Monocytes Absolute 0.59 0.10 - 0.90 10*3/mm3    Eosinophils Absolute 0.16 0.00 - 0.40 10*3/mm3    Basophils Absolute 0.05 0.00 - 0.20 10*3/mm3    Immature Granulocyte Rel % 0.3 0.0 - 0.5 %    Immature Grans Absolute 0.02 0.00 - 0.05 10*3/mm3    nRBC 0.0 0.0 - 0.2 /100 WBC         Assessment/Plan   Zachery was seen today for hypertension, hyperlipidemia, blood sugar problem, joint pain, diarrhea and rectal bleeding.    Diagnoses and all orders for this visit:    Benign essential hypertension    Mixed hyperlipidemia  -     CBC & Differential  -     Comprehensive Metabolic Panel  -     Lipid Panel    Impaired fasting glucose  -     Hemoglobin A1c    Trigger middle finger of left hand  -     Ambulatory Referral to Orthopedic Surgery    Diarrhea, unspecified type  -     Ambulatory Referral to Gastroenterology    Blood in stool  -     Ambulatory Referral to Gastroenterology    Anal or rectal pain  -     Ambulatory Referral to Gastroenterology    Need for hepatitis vaccination  -     Hepatitis A Vaccine Adult IM    Other orders  -     metoprolol succinate XL (TOPROL-XL) 100 MG 24 hr tablet; Take 1 tablet by mouth Daily.      Plan:  1.  Benign essential hypertension: Will continue current medication - toprol, low-sodium diet advised  2.mixed hyperlipidemia: will obtain   fasting CMP and lipid panel.  Diet and exercise counseled,     3. Impaired glucose   : will obtain   fasting CMP  and hba1c  , diet and exercise counseled ,    4. Diarrhea  : refer to gi , will get labs  5. Blood in sleep : refer to gi  6. Rectal pain :  refer to gi  7. Need for hepatitis a vaccine : given first hep a shot today   8. Left hand trigger finger : refer to orthopedist          Keila Bolivar MD

## 2019-04-19 LAB
ALBUMIN SERPL-MCNC: 4.4 G/DL (ref 3.5–5)
ALBUMIN/GLOB SERPL: 1.9 G/DL (ref 1–2)
ALP SERPL-CCNC: 59 U/L (ref 38–126)
ALT SERPL-CCNC: 69 U/L (ref 13–69)
AST SERPL-CCNC: 34 U/L (ref 15–46)
BASOPHILS # BLD AUTO: 0.05 10*3/MM3 (ref 0–0.2)
BASOPHILS NFR BLD AUTO: 0.6 % (ref 0–1.5)
BILIRUB SERPL-MCNC: 0.7 MG/DL (ref 0.2–1.3)
BUN SERPL-MCNC: 12 MG/DL (ref 7–20)
BUN/CREAT SERPL: 12 (ref 6.3–21.9)
CALCIUM SERPL-MCNC: 9.7 MG/DL (ref 8.4–10.2)
CHLORIDE SERPL-SCNC: 101 MMOL/L (ref 98–107)
CHOLEST SERPL-MCNC: 147 MG/DL (ref 0–199)
CO2 SERPL-SCNC: 29 MMOL/L (ref 26–30)
CREAT SERPL-MCNC: 1 MG/DL (ref 0.6–1.3)
EOSINOPHIL # BLD AUTO: 0.16 10*3/MM3 (ref 0–0.4)
EOSINOPHIL NFR BLD AUTO: 2.1 % (ref 0.3–6.2)
ERYTHROCYTE [DISTWIDTH] IN BLOOD BY AUTOMATED COUNT: 12.9 % (ref 12.3–15.4)
GLOBULIN SER CALC-MCNC: 2.3 GM/DL
GLUCOSE SERPL-MCNC: 103 MG/DL (ref 74–98)
HBA1C MFR BLD: 5.7 % (ref 4.8–5.6)
HCT VFR BLD AUTO: 42.4 % (ref 37.5–51)
HDLC SERPL-MCNC: 44 MG/DL (ref 40–60)
HGB BLD-MCNC: 14.5 G/DL (ref 13–17.7)
IMM GRANULOCYTES # BLD AUTO: 0.02 10*3/MM3 (ref 0–0.05)
IMM GRANULOCYTES NFR BLD AUTO: 0.3 % (ref 0–0.5)
LDLC SERPL CALC-MCNC: 77 MG/DL (ref 0–99)
LYMPHOCYTES # BLD AUTO: 2.8 10*3/MM3 (ref 0.7–3.1)
LYMPHOCYTES NFR BLD AUTO: 36.3 % (ref 19.6–45.3)
MCH RBC QN AUTO: 29.8 PG (ref 26.6–33)
MCHC RBC AUTO-ENTMCNC: 34.2 G/DL (ref 31.5–35.7)
MCV RBC AUTO: 87.2 FL (ref 79–97)
MONOCYTES # BLD AUTO: 0.59 10*3/MM3 (ref 0.1–0.9)
MONOCYTES NFR BLD AUTO: 7.7 % (ref 5–12)
NEUTROPHILS # BLD AUTO: 4.09 10*3/MM3 (ref 1.7–7)
NEUTROPHILS NFR BLD AUTO: 53 % (ref 42.7–76)
NRBC BLD AUTO-RTO: 0 /100 WBC (ref 0–0.2)
PLATELET # BLD AUTO: 274 10*3/MM3 (ref 140–450)
POTASSIUM SERPL-SCNC: 4.1 MMOL/L (ref 3.5–5.1)
PROT SERPL-MCNC: 6.7 G/DL (ref 6.3–8.2)
RBC # BLD AUTO: 4.86 10*6/MM3 (ref 4.14–5.8)
SODIUM SERPL-SCNC: 141 MMOL/L (ref 137–145)
TRIGL SERPL-MCNC: 128 MG/DL
VLDLC SERPL CALC-MCNC: 25.6 MG/DL
WBC # BLD AUTO: 7.71 10*3/MM3 (ref 3.4–10.8)

## 2019-04-22 DIAGNOSIS — M25.542 ARTHRALGIA OF LEFT HAND: Primary | ICD-10-CM

## 2019-04-23 ENCOUNTER — OFFICE VISIT (OUTPATIENT)
Dept: ORTHOPEDIC SURGERY | Facility: CLINIC | Age: 44
End: 2019-04-23

## 2019-04-23 VITALS — WEIGHT: 239 LBS | BODY MASS INDEX: 37.51 KG/M2 | RESPIRATION RATE: 18 BRPM | HEIGHT: 67 IN

## 2019-04-23 DIAGNOSIS — M65.30 TRIGGER FINGER, ACQUIRED: Primary | ICD-10-CM

## 2019-04-23 PROCEDURE — 20550 NJX 1 TENDON SHEATH/LIGAMENT: CPT | Performed by: ORTHOPAEDIC SURGERY

## 2019-04-23 PROCEDURE — 99203 OFFICE O/P NEW LOW 30 MIN: CPT | Performed by: ORTHOPAEDIC SURGERY

## 2019-04-23 RX ORDER — LIDOCAINE HYDROCHLORIDE 10 MG/ML
5 INJECTION, SOLUTION INFILTRATION; PERINEURAL
Status: COMPLETED | OUTPATIENT
Start: 2019-04-23 | End: 2019-04-23

## 2019-04-23 RX ADMIN — LIDOCAINE HYDROCHLORIDE 5 MG: 10 INJECTION, SOLUTION INFILTRATION; PERINEURAL at 14:53

## 2019-04-23 NOTE — PROGRESS NOTES
Subjective   Patient ID: Zachery Lomeli is a 43 y.o. male  Pain of the Left Hand (Patient is here today for left middle finger trigger symptoms. He denies any injury or trauma to the hand and states the pain and locking up started about 6 months ago.)             History of Present Illness  Right-hand-dominant male with pain 6 months over the A1 trigger finger of the left long finger does not recall specific trauma does hurt when he plays his guitar or tries to lift things or use a weed [place].  Denies other joint arthralgias has had no prior trauma injections or other wrist forearm elbow pain or paresthesias.      Review of Systems   Constitutional: Negative for fever.   HENT: Negative for voice change.    Eyes: Negative for visual disturbance.   Respiratory: Negative for shortness of breath.    Cardiovascular: Negative for chest pain.   Gastrointestinal: Negative for abdominal distention and abdominal pain.   Genitourinary: Negative for dysuria.   Musculoskeletal: Positive for arthralgias. Negative for gait problem and joint swelling.   Skin: Negative for rash.   Neurological: Negative for speech difficulty.   Hematological: Does not bruise/bleed easily.   Psychiatric/Behavioral: Negative for confusion.       Past Medical History:   Diagnosis Date   • Hypertension    • Low back pain         History reviewed. No pertinent surgical history.    History reviewed. No pertinent family history.    Social History     Socioeconomic History   • Marital status:      Spouse name: Not on file   • Number of children: Not on file   • Years of education: Not on file   • Highest education level: Not on file   Tobacco Use   • Smoking status: Former Smoker     Last attempt to quit: 10/15/2018     Years since quittin.5   • Smokeless tobacco: Former User   Substance and Sexual Activity   • Alcohol use: No   • Drug use: No   • Sexual activity: Defer       I have reviewed all of the above social hx, family hx, surgical hx,  "medications, allergies & ROS and confirm that it is accurate.    Allergies   Allergen Reactions   • No Known Drug Allergy          Current Outpatient Medications:   •  Cholecalciferol (VITAMIN D-3) 1000 UNITS capsule, Take  by mouth Daily., Disp: , Rfl:   •  hydrochlorothiazide (HYDRODIURIL) 25 MG tablet, Take 1 tablet by mouth Daily., Disp: 90 tablet, Rfl: 3  •  metoprolol succinate XL (TOPROL-XL) 100 MG 24 hr tablet, Take 1 tablet by mouth Daily., Disp: 90 tablet, Rfl: 1  •  vitamin B-12 (CYANOCOBALAMIN) 1000 MCG tablet, Take 1,000 mcg by mouth Daily., Disp: , Rfl:     Objective   Resp 18   Ht 170.2 cm (67\")   Wt 108 kg (239 lb)   BMI 37.43 kg/m²    Physical Exam  Constitutional: Patient is oriented to person, place, and time. Patient appears well-developed and well-nourished.   HENT:Head: Normocephalic and atraumatic.   Eyes: EOM are normal. Pupils are equal, round, and reactive to light.   Neck: Normal range of motion. Neck supple.   Cardiovascular: Normal rate.    Pulmonary/Chest: Effort normal and breath sounds normal.   Abdominal: Soft.   Neurological: Patient is alert and oriented to person, place, and time.   Skin: Skin is warm and dry.   Psychiatric: Patient has a normal mood and affect.   Nursing note and vitals reviewed.       [unfilled]   Left hand: Positive tenderness over the A1 trigger of the long finger full passive extension of the long finger normal sensation negative Tinel's and Phalen sign at the median nerve at the wrist full wrist range of motion no palpable ganglion cysts at the wrist or carpal areas.  Skin appears normal in the hand with normal sweat distribution and normal circulation.    Assessment/Plan   Review of Radiographic Studies:    Radiographic images today of affected area I personally viewed and showed no sign of acute fracture or dislocation.      Injection Tendon or Ligament-left  middle finger  Date/Time: 4/23/2019 2:53 PM  Performed by: Alexander Olivier MD  Authorized by: " "Alexander Olivier MD   Consent: Verbal consent obtained.  Risks and benefits: risks, benefits and alternatives were discussed  Consent given by: patient  Patient understanding: patient states understanding of the procedure being performed  Patient consent: the patient's understanding of the procedure matches consent given  Procedure consent: procedure consent matches procedure scheduled  Relevant documents: relevant documents present and verified  Test results: test results available and properly labeled  Site marked: the operative site was marked  Imaging studies: imaging studies available  Patient identity confirmed: verbally with patient  Time out: Immediately prior to procedure a \"time out\" was called to verify the correct patient, procedure, equipment, support staff and site/side marked as required.  Preparation: Patient was prepped and draped in the usual sterile fashion.  Patient tolerance: Patient tolerated the procedure well with no immediate complications  Comments: 0.5cc triamcinlone [id number] exp-01/01/2021  Medications administered: 5 mg lidocaine 1 %           Zachery was seen today for pain.    Diagnoses and all orders for this visit:    Trigger finger, acquired       Orthopedic activities reviewed and patient expressed appreciation and Risk, benefits, and merits of treatment alternatives reviewed with the patient and questions answered      Recommendations/Plan:   Work/Activity Status: May perform usual activities as tolerated    Patient agreeable to call or return sooner for any concerns.         Using diagrams explained to him the nature of the condition treatment options indications for future care possibility of recurrence after cortisone injection and the nature of surgical release should that be necessary was explained to him.  Also explained risks and complications associated with a cortisone injection including stiffness pain failure of relief.    Impression:  Left nondominant " hand long finger trigger finger    Plan: return when necessary

## 2019-05-20 ENCOUNTER — OFFICE VISIT (OUTPATIENT)
Dept: GASTROENTEROLOGY | Facility: CLINIC | Age: 44
End: 2019-05-20

## 2019-05-20 VITALS
SYSTOLIC BLOOD PRESSURE: 136 MMHG | RESPIRATION RATE: 16 BRPM | HEART RATE: 73 BPM | HEIGHT: 67 IN | WEIGHT: 234 LBS | TEMPERATURE: 98.6 F | DIASTOLIC BLOOD PRESSURE: 100 MMHG | BODY MASS INDEX: 36.73 KG/M2

## 2019-05-20 DIAGNOSIS — Z12.11 COLON CANCER SCREENING: ICD-10-CM

## 2019-05-20 DIAGNOSIS — K62.89 ANAL OR RECTAL PAIN: Primary | Chronic | ICD-10-CM

## 2019-05-20 PROBLEM — I10 BP (HIGH BLOOD PRESSURE): Status: ACTIVE | Noted: 2019-05-20

## 2019-05-20 PROBLEM — M65.9 TENOSYNOVITIS OF ELBOW: Status: ACTIVE | Noted: 2019-05-20

## 2019-05-20 PROBLEM — E78.2 MULTIPLE-TYPE HYPERLIPIDEMIA: Status: ACTIVE | Noted: 2019-05-20

## 2019-05-20 PROBLEM — R53.83 FATIGUE: Status: ACTIVE | Noted: 2019-05-20

## 2019-05-20 PROBLEM — E53.8 COBALAMIN DEFICIENCY: Status: ACTIVE | Noted: 2019-05-20

## 2019-05-20 PROBLEM — F41.9 ANXIETY DISORDER: Status: ACTIVE | Noted: 2019-05-20

## 2019-05-20 PROBLEM — E55.9 VITAMIN D DEFICIENCY: Status: ACTIVE | Noted: 2019-05-20

## 2019-05-20 PROBLEM — M65.929 TENOSYNOVITIS OF ELBOW: Status: ACTIVE | Noted: 2019-05-20

## 2019-05-20 PROBLEM — I10 BENIGN ESSENTIAL HYPERTENSION: Status: ACTIVE | Noted: 2019-05-20

## 2019-05-20 PROBLEM — G47.33 OBSTRUCTIVE SLEEP APNEA SYNDROME: Status: ACTIVE | Noted: 2019-05-20

## 2019-05-20 PROCEDURE — 99203 OFFICE O/P NEW LOW 30 MIN: CPT | Performed by: NURSE PRACTITIONER

## 2019-05-20 RX ORDER — SODIUM CHLORIDE 9 MG/ML
70 INJECTION, SOLUTION INTRAVENOUS CONTINUOUS PRN
Status: CANCELLED | OUTPATIENT
Start: 2019-05-20

## 2019-05-20 NOTE — PATIENT INSTRUCTIONS
1. Low fat, high fiber diet with liberal water intake.   2. Exercise, weight reduction.   3. Colonoscopy: Description of the procedure, risks, benefits, alternatives and options, including nonoperative options, were discussed with the patient in detail. The patient understands and wishes to proceed.

## 2019-05-20 NOTE — PROGRESS NOTES
"Chief Complaint   Patient presents with   • [facility]     rectal pain      The patient has a history of rectal pain since February 2019. The patient may have the pain several days per week, then the pain will improve for a week or two, then return. The pain is mild to moderate and feels like a \"pressure\" in his rectum. The pain is worse when he lays on his back. The patient denies bright red blood per rectum or melena. The patient denies constipation or diarrhea.    There is no history of abdominal pain.. The patient denies nausea or vomiting. There is no history of reflux. The patient denies dysphagia or odynophagia. There is no history of recent significant weight loss. There is no history of liver disease in the past. There is no family history of colon cancer. The patient has not had a colonoscopy in the past.    Rectal Pain   This is a recurrent problem. Episode onset: February 2019. The problem occurs intermittently. The problem has been unchanged. Pertinent negatives include no abdominal pain, arthralgias, chest pain, chills, coughing, fatigue, fever, headaches, joint swelling, myalgias, nausea, rash or vomiting. Nothing aggravates the symptoms. He has tried nothing for the symptoms.     Review of Systems   Constitutional: Negative for appetite change, chills, fatigue, fever and unexpected weight change.   HENT: Negative for mouth sores, nosebleeds and trouble swallowing.    Eyes: Negative for discharge and redness.   Respiratory: Negative for apnea, cough and shortness of breath.    Cardiovascular: Negative for chest pain, palpitations and leg swelling.   Gastrointestinal: Positive for rectal pain. Negative for abdominal distention, abdominal pain, anal bleeding, blood in stool, constipation, diarrhea, nausea and vomiting.   Endocrine: Negative for cold intolerance, heat intolerance and polydipsia.   Genitourinary: Negative for dysuria, hematuria and urgency.   Musculoskeletal: Negative for arthralgias, " "joint swelling and myalgias.   Skin: Negative for rash.   Allergic/Immunologic: Negative for food allergies and immunocompromised state.   Neurological: Negative for dizziness, seizures, syncope and headaches.   Hematological: Negative for adenopathy. Does not bruise/bleed easily.   Psychiatric/Behavioral: Negative for dysphoric mood. The patient is not nervous/anxious and is not hyperactive.      Patient Active Problem List   Diagnosis   • Anxiety disorder   • Benign essential hypertension   • BP (high blood pressure)   • Cobalamin deficiency   • Fatigue   • Multiple-type hyperlipidemia   • Obstructive sleep apnea syndrome   • Tenosynovitis of elbow   • Vitamin D deficiency   • Anal or rectal pain     Past Medical History:   Diagnosis Date   • Hypertension    • Low back pain    • Snores      Past Surgical History:   Procedure Laterality Date   • CYST REMOVAL     • NOSE SURGERY      repair    • WISDOM TOOTH EXTRACTION       Family History   Problem Relation Age of Onset   • Colon cancer Neg Hx      Social History     Tobacco Use   • Smoking status: Former Smoker     Last attempt to quit: 10/15/2018     Years since quittin.5   • Smokeless tobacco: Former User   Substance Use Topics   • Alcohol use: No       Current Outpatient Medications:   •  Cholecalciferol (VITAMIN D-3) 1000 UNITS capsule, Take  by mouth Daily., Disp: , Rfl:   •  hydrochlorothiazide (HYDRODIURIL) 25 MG tablet, Take 1 tablet by mouth Daily., Disp: 90 tablet, Rfl: 3  •  metoprolol succinate XL (TOPROL-XL) 100 MG 24 hr tablet, Take 1 tablet by mouth Daily., Disp: 90 tablet, Rfl: 1  •  vitamin B-12 (CYANOCOBALAMIN) 1000 MCG tablet, Take 1,000 mcg by mouth Daily., Disp: , Rfl:     Allergies   Allergen Reactions   • No Known Drug Allergy      Blood pressure 136/100, pulse 73, temperature 98.6 °F (37 °C), resp. rate 16, height 170.2 cm (67\"), weight 106 kg (234 lb).    Physical Exam   Constitutional: He is oriented to person, place, and time. He " appears well-developed and well-nourished. No distress.   HENT:   Head: Normocephalic and atraumatic.   Right Ear: Hearing and external ear normal.   Left Ear: Hearing and external ear normal.   Nose: Nose normal.   Mouth/Throat: Oropharynx is clear and moist and mucous membranes are normal. Mucous membranes are not pale, not dry and not cyanotic. No oral lesions. No oropharyngeal exudate.   Eyes: Conjunctivae and EOM are normal. Right eye exhibits no discharge. Left eye exhibits no discharge.   Neck: Trachea normal. Neck supple. No JVD present. No edema present. No thyroid mass and no thyromegaly present.   Cardiovascular: Normal rate, regular rhythm, S2 normal and normal heart sounds. Exam reveals no gallop, no S3 and no friction rub.   No murmur heard.  Pulmonary/Chest: Effort normal and breath sounds normal. No respiratory distress. He exhibits no tenderness.   Abdominal: Normal appearance and bowel sounds are normal. He exhibits no distension, no ascites and no mass. There is no splenomegaly or hepatomegaly. There is no tenderness. There is no rigidity, no rebound and no guarding. No hernia.     Vascular Status -  His right foot exhibits no edema. His left foot exhibits no edema.  Lymphadenopathy:     He has no cervical adenopathy.        Left: No supraclavicular adenopathy present.   Neurological: He is alert and oriented to person, place, and time. He has normal strength. No cranial nerve deficit or sensory deficit.   Skin: No rash noted. He is not diaphoretic. No cyanosis. No pallor. Nails show no clubbing.   Psychiatric: He has a normal mood and affect.   Nursing note and vitals reviewed.  Stigmata of chronic liver disease:  None.  Asterixis:  None.    Laboratory Tests:   Upon review of records:     Dated 4/19/2019 glucose 103 BUN 12 creatinine 1.0 sodium 141 potassium 4.1 chloride 101 CO2 29 calcium 9.7 albumin 4.4 total bilirubin 0.7 alkaline phosphatase 59 AST 34 ALT 69 WBC 7.71 hemoglobin 14.5  hematocrit 42.4 platelet count 274 MCV 87.2 hemoglobin A1c 5.7    Assessment:      ICD-10-CM ICD-9-CM   1. Anal or rectal pain K62.89 569.42   2. Colon cancer screening Z12.11 V76.51       Discussion:  1. History of rectal pain that has not resolved.  Differentials include hemorrhoids, fissure, colorectal neoplastic disease.  2. The patient has not had a colonoscopy in the past.  There is no family history of colon cancer.    Plan/  Patient Instructions   1. Low fat, high fiber diet with liberal water intake.   2. Exercise, weight reduction.   3. Colonoscopy: Description of the procedure, risks, benefits, alternatives and options, including nonoperative options, were discussed with the patient in detail. The patient understands and wishes to proceed.     Veto Alfred, APRN

## 2019-05-31 PROBLEM — Z12.11 COLON CANCER SCREENING: Status: ACTIVE | Noted: 2019-05-31

## 2019-06-17 ENCOUNTER — ANESTHESIA (OUTPATIENT)
Dept: GASTROENTEROLOGY | Facility: HOSPITAL | Age: 44
End: 2019-06-17

## 2019-06-17 ENCOUNTER — ANESTHESIA EVENT (OUTPATIENT)
Dept: GASTROENTEROLOGY | Facility: HOSPITAL | Age: 44
End: 2019-06-17

## 2019-06-17 ENCOUNTER — HOSPITAL ENCOUNTER (OUTPATIENT)
Facility: HOSPITAL | Age: 44
Setting detail: HOSPITAL OUTPATIENT SURGERY
Discharge: HOME | End: 2019-06-17
Attending: INTERNAL MEDICINE | Admitting: INTERNAL MEDICINE

## 2019-06-17 VITALS
HEIGHT: 67 IN | RESPIRATION RATE: 18 BRPM | DIASTOLIC BLOOD PRESSURE: 84 MMHG | OXYGEN SATURATION: 96 % | HEART RATE: 69 BPM | BODY MASS INDEX: 35.31 KG/M2 | TEMPERATURE: 98 F | SYSTOLIC BLOOD PRESSURE: 130 MMHG | WEIGHT: 225 LBS

## 2019-06-17 DIAGNOSIS — K62.89 ANAL OR RECTAL PAIN: ICD-10-CM

## 2019-06-17 DIAGNOSIS — Z12.11 COLON CANCER SCREENING: ICD-10-CM

## 2019-06-17 PROCEDURE — 25010000002 MIDAZOLAM PER 1 MG: Performed by: NURSE ANESTHETIST, CERTIFIED REGISTERED

## 2019-06-17 PROCEDURE — 25010000002 FENTANYL CITRATE (PF) 100 MCG/2ML SOLUTION: Performed by: NURSE ANESTHETIST, CERTIFIED REGISTERED

## 2019-06-17 PROCEDURE — 25010000002 PROPOFOL 1000 MG/100ML EMULSION: Performed by: NURSE ANESTHETIST, CERTIFIED REGISTERED

## 2019-06-17 PROCEDURE — 45380 COLONOSCOPY AND BIOPSY: CPT | Performed by: INTERNAL MEDICINE

## 2019-06-17 RX ORDER — LIDOCAINE 50 MG/G
OINTMENT TOPICAL AS NEEDED
Status: DISCONTINUED | OUTPATIENT
Start: 2019-06-17 | End: 2019-06-17 | Stop reason: HOSPADM

## 2019-06-17 RX ORDER — MIDAZOLAM HYDROCHLORIDE 1 MG/ML
INJECTION INTRAMUSCULAR; INTRAVENOUS AS NEEDED
Status: DISCONTINUED | OUTPATIENT
Start: 2019-06-17 | End: 2019-06-17 | Stop reason: SURG

## 2019-06-17 RX ORDER — SIMETHICONE 20 MG/.3ML
EMULSION ORAL AS NEEDED
Status: DISCONTINUED | OUTPATIENT
Start: 2019-06-17 | End: 2019-06-17 | Stop reason: HOSPADM

## 2019-06-17 RX ORDER — SODIUM CHLORIDE 9 MG/ML
70 INJECTION, SOLUTION INTRAVENOUS CONTINUOUS PRN
Status: DISCONTINUED | OUTPATIENT
Start: 2019-06-17 | End: 2019-06-17 | Stop reason: HOSPADM

## 2019-06-17 RX ORDER — PROPOFOL 10 MG/ML
INJECTION, EMULSION INTRAVENOUS AS NEEDED
Status: DISCONTINUED | OUTPATIENT
Start: 2019-06-17 | End: 2019-06-17 | Stop reason: SURG

## 2019-06-17 RX ORDER — FENTANYL CITRATE 50 UG/ML
INJECTION, SOLUTION INTRAMUSCULAR; INTRAVENOUS AS NEEDED
Status: DISCONTINUED | OUTPATIENT
Start: 2019-06-17 | End: 2019-06-17 | Stop reason: SURG

## 2019-06-17 RX ORDER — SODIUM CHLORIDE 0.9 % (FLUSH) 0.9 %
3 SYRINGE (ML) INJECTION AS NEEDED
Status: DISCONTINUED | OUTPATIENT
Start: 2019-06-17 | End: 2019-06-17 | Stop reason: HOSPADM

## 2019-06-17 RX ADMIN — PROPOFOL 50 MG: 10 INJECTION, EMULSION INTRAVENOUS at 11:30

## 2019-06-17 RX ADMIN — FENTANYL CITRATE 100 MCG: 50 INJECTION, SOLUTION INTRAMUSCULAR; INTRAVENOUS at 11:00

## 2019-06-17 RX ADMIN — PROPOFOL 50 MG: 10 INJECTION, EMULSION INTRAVENOUS at 11:25

## 2019-06-17 RX ADMIN — PROPOFOL 50 MG: 10 INJECTION, EMULSION INTRAVENOUS at 11:00

## 2019-06-17 RX ADMIN — SODIUM CHLORIDE 70 ML/HR: 9 INJECTION, SOLUTION INTRAVENOUS at 09:26

## 2019-06-17 RX ADMIN — PROPOFOL 50 MG: 10 INJECTION, EMULSION INTRAVENOUS at 11:05

## 2019-06-17 RX ADMIN — PROPOFOL 50 MG: 10 INJECTION, EMULSION INTRAVENOUS at 11:10

## 2019-06-17 RX ADMIN — MIDAZOLAM HYDROCHLORIDE 2 MG: 1 INJECTION, SOLUTION INTRAMUSCULAR; INTRAVENOUS at 11:00

## 2019-06-17 NOTE — ANESTHESIA POSTPROCEDURE EVALUATION
Patient: Zachery Lomeli    Procedure Summary     Date:  06/17/19 Room / Location:  [facility] ENDOSCOPY 2 / [facility] ENDOSCOPY    Anesthesia Start:  1103 Anesthesia Stop:  1151    Procedure:  COLONOSCOPY WITH BIOPSIES, POLYPECTOMIES and ANASCOPY (N/A Anus) Diagnosis:       Diverticulosis of colon      Colon polyps      Internal hemorrhoids      (Anal or rectal pain [K62.89])      (Colon cancer screening [Z12.11])    Surgeon:  Cam Robles MD Provider:  Maulik Jones CRNA    Anesthesia Type:  MAC ASA Status:  2          Anesthesia Type: MAC  Last vitals  BP   136/74 (06/17/19 1155)   Temp   98 °F (36.7 °C) (06/17/19 1155)   Pulse   77 (06/17/19 1155)   Resp   16 (06/17/19 1155)     SpO2   95 % (06/17/19 1155)     Post Anesthesia Care and Evaluation    Patient location during evaluation: bedside  Patient participation: complete - patient participated  Level of consciousness: awake  Pain score: 0  Pain management: adequate  Airway patency: patent  Anesthetic complications: No anesthetic complications  PONV Status: controlled  Cardiovascular status: acceptable and stable  Respiratory status: acceptable and room air  Hydration status: acceptable

## 2019-06-17 NOTE — ANESTHESIA PREPROCEDURE EVALUATION
Anesthesia Evaluation     Patient summary reviewed and Nursing notes reviewed   no history of anesthetic complications:  NPO Solid Status: > 8 hours  NPO Liquid Status: > 8 hours           Airway   Mallampati: II  TM distance: >3 FB  Neck ROM: full  No difficulty expected  Dental - normal exam     Pulmonary - normal exam   (+) pneumonia resolved , a smoker Former, sleep apnea,     ROS comment: Hx of bronchitis    Former smoker 11 pack years, quit 2018, uses vape now.   Cardiovascular - normal exam  Exercise tolerance: good (4-7 METS)    Patient on routine beta blocker and Beta blocker given within 24 hours of surgery    (+) hypertension well controlled 2 medications or greater, hyperlipidemia,       Neuro/Psych  (+) psychiatric history Anxiety,     GI/Hepatic/Renal/Endo    (+)  GERD, GI bleeding, renal disease stones,     Musculoskeletal     (+) back pain,   Abdominal  - normal exam   Substance History - negative use     OB/GYN negative ob/gyn ROS         Other        ROS/Med Hx Other: Diarrhea   Rectal bleeding                  Anesthesia Plan    ASA 2     MAC   (Patient advised that intravenous sedation would be utilized as primary anesthetic technique. Every effort will be made to make sure patient is comfortable. Patient advised that they may experience recall of events of the procedure. Patient verbalized understanding and agreed to plan. )  intravenous induction   Anesthetic plan, all risks, benefits, and alternatives have been provided, discussed and informed consent has been obtained with: patient.    Plan discussed with CRNA.

## 2019-06-17 NOTE — INTERVAL H&P NOTE
"  H&P reviewed. The patient was examined and there are no changes to the H&P.       No recent shortness of breath or chest pains.      Blood pressure 146/99, pulse 69, temperature 98.6 °F (37 °C), temperature source Temporal, resp. rate 16, height 170.2 cm (67\"), weight 102 kg (225 lb), SpO2 98 %.    Chest: clear to auscultation.  Cardiac exam: No S3 no murmurs.   Abdomen: soft bowel sounds present nondistended nontender.        "

## 2019-06-17 NOTE — DISCHARGE INSTRUCTIONS
No pushing, pulling, tugging,  heavy lifting, or strenuous activity.  No major decision making, driving, or drinking alcoholic beverages for 24 hours. ( due to the medications you have  received)  Always use good hand hygiene/washing techniques.  NO driving while taking pain medications.    To assist you in voiding:  Drink plenty of fluids  Listen to running water while attempting to void.    If you are unable to urinate and you have an uncomfortable urge to void or it has been   6 hours since you were discharged, return to the Emergency Room  ************************************************************************************    Postprocedure instructions:    1. Nothing by mouth to fully alert.  2. Once fully alert may have clear liquid diet.  3. Advance diet as tolerated.  4. Vital signs as routine.    Diet:     1. Low-fat diet.  2. [place]'s All Bran-Bran Buds 1/4 cup daily.  3. May add Honey Bunches of Oats with Almonds 1/4 cup for taste.  4. Use almond milk, 1 or 2% milk.  5. Drink liberal amounts of water.    Blood Thinner Directions:    Avoid Aspirin & other NSAIDS for _7__ days. Tylenol is okay.    Treatments:  If constipated may take Babb' stool softener over-the-counter.  Take 1 gelcaps orally at night.      Other Instructions:    Call [facility] at [phone] or come to the Emergency Department if you experience the following: Chest pain, abdominal pain, bleeding (vomiting of blood or coffee colored material, black stools or mariposa blood in stools), fever/chills, nausea and vomiting or dizziness.      Follow-up:  DR. DAPHNE ROBLES in 3-4 weeks.Office phone # [phone].    Follow-up colonoscopy: in 5 years.    ************************************************************************************    Notes to the patient and the family from Dr. Robles.    Dear patient/family member,    Today your colon was examined extensively from rectum to cecum and beyond into the small intestine twice.    Findings on today's procedure are as follows:    1. Diverticulosis. These are benign outpouchings in the colon.   2. Small Colon polyps. 4 were found and removed.   3. No cancer. No inflammation or colitis. No suggestion of Crohn's disease.  4. Internal hemorrhoids.  5. No fissures or tears.    Recommendations:    1. As above.  2. SITZ bath 3 times a day. 15  minutes each time for 2 to 4 days.  3. Xylocaine-lidocaine 5% ointment.  This has been given to you by the same day surgery nurse.  Put some on your fingertip and applied outside and inside of the rectum 3-4 times a day for pain.  If you do not have pain do not apply this ointment.  4. Use CORTIZONE 10 OINTMENT (hydrocortisone 1% ointment) over the counter. AVOID CREAM OR GEL.  Apply anorectally  3 times a day for 1 week.  May need to use a liner to protect the garments.      Should you have more questions please do not hesitate to talk to the nurse who can call me and let me talk to you.      I hope you feel better.    Cam Robles M.D., FACP, FACG.

## 2019-06-17 NOTE — OP NOTE
PROCEDURE:  Colonoscopy to the terminal ileum with 4 cold biopsy polypectomies and biopsies.    DATE OF PROCEDURE:  June 17, 2019    REFERRING PROVIDER:  Keila Bolivar MD     INSTRUMENT USED:  Olympus PCF H180 AL videocolonoscope.       INDICATIONS OF THE PROCEDURE: This is a 43-year-old white male for colon cancer screening.  There is history of anorectal pain.    PREVIOUS COLONOSCOPY: None.    BIOPSIES: For cold biopsy polypectomies were performed one in the descending colon one in the transverse colon and 2 in the ascending colon biopsies were obtained from the rectum.      PHOTOGRAPHS:  Photographs were included in the medical records.     MEDICATIONS:  MAC.       CONSENT/PREPROCEDURE EVALUATION:  Risks, benefits, alternatives and options of the procedure including risks of sedation/anesthesia were discussed with the patient and informed consent was obtained prior to the procedure.  History and physical examination were performed and nothing precluded the test.      REPORT:  The patient was placed in left lateral decubitus position and a digital examination was performed.  Once under the influence of IV sedation, the instrument was inserted into the rectum and advanced under direct vision to cecum which was identified by the ileocecal valve, triradiate folds and appendiceal orifice. The scope was then maneuvered into the terminal ileum.        FINDINGS:      Digital rectal examination:  Good anal tone.  No perianal pathology.  No mass.        Terminal ileum:  7-8 cm.  Normal.     Cecum and ascending colon: 2, 3 mm sessile polyps in the ascending colon were removed with cold biopsy forceps.     Hepatic flexure, transverse colon, splenic flexure: 3 mm sessile polyp in the transverse colon was removed with cold biopsy forceps .         Descending colon, sigmoid colon and rectum: Early diverticulosis.  3 mm sessile polyp in the descending colon was removed with cold biopsy forceps.  Mild erythema was noted in the  rectum.  This was biopsied.  A retroflex examination within the rectum revealed internal hemorrhoids and hypertrophic anal papillae.        The scope was then straightened, the lower GI tract was decompressed, and the scope was pulled out of the patient.     Anoscopy: Using a clear anoscope and colonoscope as a light source, anoscopy was performed.  Dentate line was noted.  No fissures or tears were seen.  The scope was then pulled out of the patient.  The patient tolerated the procedure well.    The patient tolerated the procedure well.  There were no immediate complications and the patient was transferred in stable condition for post procedure observation.      TECHNICAL DATA:   1. [place] prep score: 8 (3+2+3).    2. Anus to cecal time:  2  minutes.  3. Difficulty of examination:  Average.    4. Withdrawal time: 16 minutes.  5. Procedure time: 20 minutes  6. Retroflex examination in right colon: Yes.    7. Second look Rectum to cecum with decompression: Yes.    DIAGNOSES:    1. Diverticulosis involving the left colon.  2. Colon polyps.  4 were  found and removed.  3. Mild erythema in the rectum.  This was biopsied.  4. Internal hemorrhoids and hypertrophic anal papillae.    RECOMMENDATIONS:     1. Dietary instructions.  2. Follow biopsies.    3. Follow-up:      4. Followup colonoscopy:  5 years.     5. Hemorrhoid care.       Thank you very much for letting me participate in the care of this patient. Please do not hesitate to call me if you have any questions.

## 2019-06-20 LAB
LAB AP CASE REPORT: NORMAL
PATH REPORT.FINAL DX SPEC: NORMAL

## 2019-08-06 ENCOUNTER — OFFICE VISIT (OUTPATIENT)
Dept: GASTROENTEROLOGY | Facility: CLINIC | Age: 44
End: 2019-08-06

## 2019-08-06 VITALS
RESPIRATION RATE: 16 BRPM | TEMPERATURE: 97.9 F | DIASTOLIC BLOOD PRESSURE: 87 MMHG | BODY MASS INDEX: 37.35 KG/M2 | HEART RATE: 67 BPM | HEIGHT: 67 IN | SYSTOLIC BLOOD PRESSURE: 129 MMHG | WEIGHT: 238 LBS

## 2019-08-06 DIAGNOSIS — K64.8 INTERNAL HEMORRHOIDS: Chronic | ICD-10-CM

## 2019-08-06 DIAGNOSIS — K62.89 ANAL OR RECTAL PAIN: Primary | Chronic | ICD-10-CM

## 2019-08-06 PROBLEM — Z12.11 COLON CANCER SCREENING: Status: RESOLVED | Noted: 2019-05-31 | Resolved: 2019-08-06

## 2019-08-06 PROCEDURE — 99213 OFFICE O/P EST LOW 20 MIN: CPT | Performed by: NURSE PRACTITIONER

## 2019-08-06 NOTE — PATIENT INSTRUCTIONS
1. High fiber, low fat diet with liberal water intake. Discussed in detail. The patient may take stool softeners as needed.  2. Hemorrhoidal care. Discussed in detail.   3. Follow up colonoscopy in 5 years.   4. Follow up: The patient will call back
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
## 2019-08-20 ENCOUNTER — OFFICE VISIT (OUTPATIENT)
Dept: INTERNAL MEDICINE | Facility: CLINIC | Age: 44
End: 2019-08-20

## 2019-08-20 VITALS
DIASTOLIC BLOOD PRESSURE: 73 MMHG | RESPIRATION RATE: 16 BRPM | WEIGHT: 237 LBS | BODY MASS INDEX: 37.2 KG/M2 | HEIGHT: 67 IN | SYSTOLIC BLOOD PRESSURE: 115 MMHG | OXYGEN SATURATION: 98 % | HEART RATE: 61 BPM | TEMPERATURE: 97.9 F

## 2019-08-20 DIAGNOSIS — R73.01 IMPAIRED FASTING GLUCOSE: ICD-10-CM

## 2019-08-20 DIAGNOSIS — E78.2 MIXED HYPERLIPIDEMIA: ICD-10-CM

## 2019-08-20 DIAGNOSIS — I10 BENIGN ESSENTIAL HYPERTENSION: Primary | ICD-10-CM

## 2019-08-20 PROCEDURE — 99214 OFFICE O/P EST MOD 30 MIN: CPT | Performed by: INTERNAL MEDICINE

## 2019-08-20 RX ORDER — METOPROLOL SUCCINATE 100 MG/1
100 TABLET, EXTENDED RELEASE ORAL DAILY
Qty: 90 TABLET | Refills: 2 | Status: SHIPPED | OUTPATIENT
Start: 2019-08-20 | End: 2020-01-20 | Stop reason: SDUPTHER

## 2019-08-20 RX ORDER — HYDROCHLOROTHIAZIDE 25 MG/1
25 TABLET ORAL DAILY
Qty: 90 TABLET | Refills: 2 | Status: SHIPPED | OUTPATIENT
Start: 2019-08-20 | End: 2020-01-20 | Stop reason: SDUPTHER

## 2019-08-20 NOTE — PROGRESS NOTES
Subjective   Zachery Lomeli is a 44 y.o. male.     Chief Complaint   Patient presents with   • Hypertension   • Hyperlipidemia   • Blood Sugar Problem       History of Present Illness   HPI: Patient is here to follow up on the blood pressure  The patient is taking the blood pressure medications as prescribed and has had no side effects. The patient is also here to follow up on the cholesterol and  Sugar and is trying to follow a diet. The patient is   due to get lab work done .  The patient also needs refills on medications .   Hyperlipidemia   Pertinent negatives include no chest pain or shortness of breath.   Hypertension   Pertinent negatives include no chest pain, palpitations or shortness of breath.    The following portions of the patient's history were reviewed and updated as appropriate: allergies, current medications, past family history, past medical history, past social history, past surgical history and problem list.    Review of Systems   Constitutional: Negative for appetite change, fatigue and fever.   HENT: Negative for congestion, ear discharge, ear pain, sinus pressure and sore throat.    Eyes: Negative for pain and discharge.   Respiratory: Negative for cough, shortness of breath and wheezing.    Cardiovascular: Negative for chest pain, palpitations and leg swelling.   Gastrointestinal: Negative for abdominal pain, constipation, diarrhea, nausea and vomiting.   Endocrine: Negative for cold intolerance and heat intolerance.   Genitourinary: Negative for dysuria and flank pain.   Musculoskeletal: Negative for arthralgias and joint swelling.   Skin: Negative for pallor and rash.   Allergic/Immunologic: Negative for environmental allergies and food allergies.   Neurological: Negative for dizziness, weakness and numbness.   Hematological: Negative for adenopathy. Does not bruise/bleed easily.   Psychiatric/Behavioral: Negative for behavioral problems and dysphoric mood. The patient is not nervous/anxious.   "        Current Outpatient Medications:   •  Cholecalciferol (VITAMIN D-3) 1000 UNITS capsule, Take 1,000 Units by mouth Daily., Disp: , Rfl:   •  hydrochlorothiazide (HYDRODIURIL) 25 MG tablet, Take 1 tablet by mouth Daily., Disp: 90 tablet, Rfl: 2  •  metoprolol succinate XL (TOPROL-XL) 100 MG 24 hr tablet, Take 1 tablet by mouth Daily., Disp: 90 tablet, Rfl: 2  •  vitamin B-12 (CYANOCOBALAMIN) 1000 MCG tablet, Take 1,000 mcg by mouth Daily., Disp: , Rfl:     Objective     Blood pressure 115/73, pulse 61, temperature 97.9 °F (36.6 °C), resp. rate 16, height 170.2 cm (67.01\"), weight 108 kg (237 lb), SpO2 98 %.    Physical Exam   Constitutional: He is oriented to person, place, and time. He appears well-developed and well-nourished. No distress.   HENT:   Head: Normocephalic and atraumatic.   Right Ear: External ear normal.   Left Ear: External ear normal.   Nose: Nose normal.   Mouth/Throat: Oropharynx is clear and moist.   Eyes: Conjunctivae and EOM are normal. Pupils are equal, round, and reactive to light.   Neck: Normal range of motion. Neck supple. No thyromegaly present.   Cardiovascular: Normal rate, regular rhythm and normal heart sounds.   Pulmonary/Chest: Effort normal. No respiratory distress.   Abdominal: Soft. He exhibits no distension. There is no tenderness. There is no guarding.   Musculoskeletal: Normal range of motion. He exhibits no edema.   Lymphadenopathy:     He has no cervical adenopathy.   Neurological: He is alert and oriented to person, place, and time.   No gross motor or sensory deficits   Skin: Skin is warm and dry. He is not diaphoretic. No erythema.   Psychiatric: He has a normal mood and affect.   Nursing note and vitals reviewed.    Patient's Body mass index is 37.11 kg/m². BMI is above normal parameters. Recommendations include: educational material, exercise counseling and nutrition counseling.      Results for orders placed or performed during the hospital encounter of 06/17/19 "   Tissue Pathology Exam   Result Value Ref Range    Case Report       Surgical Pathology Report                         Case: [id number]                                  Authorizing Provider:  Cam Robles MD         Collected:           06/17/2019 11:22 AM          Ordering Location:     [facility]    Received:            06/17/2019 01:44 PM                                 [facility]                                                                    Pathologist:           Ray Bolden MD                                                     Specimens:   1) - Large Intestine, Right / Ascending Colon                                                       2) - Large Intestine, Transverse Colon                                                              3) - Large Intestine, Left / Descending Colon                                                       4) - Large Intestine, Rectum, biopsies                                                     Final Diagnosis       See Scanned Report             Assessment/Plan   Zachery was seen today for hypertension, hyperlipidemia and blood sugar problem.    Diagnoses and all orders for this visit:    Benign essential hypertension    Mixed hyperlipidemia  -     CBC & Differential  -     Comprehensive Metabolic Panel  -     Lipid Panel    Impaired fasting glucose  -     Hemoglobin A1c    Other orders  -     hydrochlorothiazide (HYDRODIURIL) 25 MG tablet; Take 1 tablet by mouth Daily.  -     metoprolol succinate XL (TOPROL-XL) 100 MG 24 hr tablet; Take 1 tablet by mouth Daily.      Plan:  1.  Benign essential hypertension: Will continue current medication, low-sodium diet advised  2.mixed hyperlipidemia:   obtain fasting CMP and lipid panel.  Diet and exercise counseled,    3. impaired glucose   :   obtain  fasting CMP  and hba1c  5.7  , diet and exercise counseled              Keila Bolivar MD

## 2020-01-15 LAB
ALBUMIN SERPL-MCNC: 4.7 G/DL (ref 3.5–5.2)
ALBUMIN/GLOB SERPL: 2.1 G/DL
ALP SERPL-CCNC: 58 U/L (ref 39–117)
ALT SERPL-CCNC: 68 U/L (ref 1–41)
AST SERPL-CCNC: 38 U/L (ref 1–40)
BASOPHILS # BLD AUTO: 0.04 10*3/MM3 (ref 0–0.2)
BASOPHILS NFR BLD AUTO: 0.5 % (ref 0–1.5)
BILIRUB SERPL-MCNC: 0.5 MG/DL (ref 0.2–1.2)
BUN SERPL-MCNC: 12 MG/DL (ref 6–20)
BUN/CREAT SERPL: 10.6 (ref 7–25)
CALCIUM SERPL-MCNC: 9.5 MG/DL (ref 8.6–10.5)
CHLORIDE SERPL-SCNC: 102 MMOL/L (ref 98–107)
CHOLEST SERPL-MCNC: 168 MG/DL (ref 0–200)
CO2 SERPL-SCNC: 27.3 MMOL/L (ref 22–29)
CREAT SERPL-MCNC: 1.13 MG/DL (ref 0.76–1.27)
EOSINOPHIL # BLD AUTO: 0.22 10*3/MM3 (ref 0–0.4)
EOSINOPHIL NFR BLD AUTO: 2.8 % (ref 0.3–6.2)
ERYTHROCYTE [DISTWIDTH] IN BLOOD BY AUTOMATED COUNT: 12.8 % (ref 12.3–15.4)
GLOBULIN SER CALC-MCNC: 2.2 GM/DL
GLUCOSE SERPL-MCNC: 124 MG/DL (ref 65–99)
HBA1C MFR BLD: 6 % (ref 4.8–5.6)
HCT VFR BLD AUTO: 43.3 % (ref 37.5–51)
HDLC SERPL-MCNC: 48 MG/DL (ref 40–60)
HGB BLD-MCNC: 14.9 G/DL (ref 13–17.7)
IMM GRANULOCYTES # BLD AUTO: 0.03 10*3/MM3 (ref 0–0.05)
IMM GRANULOCYTES NFR BLD AUTO: 0.4 % (ref 0–0.5)
LDLC SERPL CALC-MCNC: 100 MG/DL (ref 0–100)
LYMPHOCYTES # BLD AUTO: 2.23 10*3/MM3 (ref 0.7–3.1)
LYMPHOCYTES NFR BLD AUTO: 28.8 % (ref 19.6–45.3)
MCH RBC QN AUTO: 30 PG (ref 26.6–33)
MCHC RBC AUTO-ENTMCNC: 34.4 G/DL (ref 31.5–35.7)
MCV RBC AUTO: 87.1 FL (ref 79–97)
MONOCYTES # BLD AUTO: 0.65 10*3/MM3 (ref 0.1–0.9)
MONOCYTES NFR BLD AUTO: 8.4 % (ref 5–12)
NEUTROPHILS # BLD AUTO: 4.57 10*3/MM3 (ref 1.7–7)
NEUTROPHILS NFR BLD AUTO: 59.1 % (ref 42.7–76)
NRBC BLD AUTO-RTO: 0 /100 WBC (ref 0–0.2)
PLATELET # BLD AUTO: 258 10*3/MM3 (ref 140–450)
POTASSIUM SERPL-SCNC: 4.1 MMOL/L (ref 3.5–5.2)
PROT SERPL-MCNC: 6.9 G/DL (ref 6–8.5)
RBC # BLD AUTO: 4.97 10*6/MM3 (ref 4.14–5.8)
SODIUM SERPL-SCNC: 142 MMOL/L (ref 136–145)
TRIGL SERPL-MCNC: 98 MG/DL (ref 0–150)
VLDLC SERPL CALC-MCNC: 19.6 MG/DL
WBC # BLD AUTO: 7.74 10*3/MM3 (ref 3.4–10.8)

## 2020-01-20 ENCOUNTER — OFFICE VISIT (OUTPATIENT)
Dept: INTERNAL MEDICINE | Facility: CLINIC | Age: 45
End: 2020-01-20

## 2020-01-20 VITALS
DIASTOLIC BLOOD PRESSURE: 82 MMHG | SYSTOLIC BLOOD PRESSURE: 138 MMHG | TEMPERATURE: 98.1 F | WEIGHT: 242 LBS | BODY MASS INDEX: 37.98 KG/M2 | OXYGEN SATURATION: 98 % | HEART RATE: 60 BPM | RESPIRATION RATE: 16 BRPM | HEIGHT: 67 IN

## 2020-01-20 DIAGNOSIS — R73.01 IMPAIRED FASTING GLUCOSE: ICD-10-CM

## 2020-01-20 DIAGNOSIS — I10 BENIGN ESSENTIAL HYPERTENSION: Primary | ICD-10-CM

## 2020-01-20 DIAGNOSIS — E78.2 MIXED HYPERLIPIDEMIA: ICD-10-CM

## 2020-01-20 PROCEDURE — 99214 OFFICE O/P EST MOD 30 MIN: CPT | Performed by: INTERNAL MEDICINE

## 2020-01-20 RX ORDER — HYDROCHLOROTHIAZIDE 25 MG/1
25 TABLET ORAL DAILY
Qty: 90 TABLET | Refills: 2 | Status: SHIPPED | OUTPATIENT
Start: 2020-01-20 | End: 2020-05-20 | Stop reason: SDUPTHER

## 2020-01-20 RX ORDER — METOPROLOL SUCCINATE 100 MG/1
100 TABLET, EXTENDED RELEASE ORAL DAILY
Qty: 90 TABLET | Refills: 2 | Status: SHIPPED | OUTPATIENT
Start: 2020-01-20 | End: 2020-05-20 | Stop reason: SDUPTHER

## 2020-01-20 NOTE — PROGRESS NOTES
Subjective   Zachery Lomeli is a 44 y.o. male.     Chief Complaint   Patient presents with   • Hypertension   • Hyperlipidemia   • Blood Sugar Problem       History of Present Illness   HPI: Patient is here to follow up on the blood pressure  The patient is taking the blood pressure medications as prescribed and has had no side effects. The patient is also here to follow up on the cholesterol and sugar and is trying to follow a diet. The patient  Had lab work done .  The patient also needs refills on medications .   Hyperlipidemia   Pertinent negatives include no chest pain or shortness of breath.   Hypertension   Pertinent negatives include no chest pain, palpitations or shortness of breath.    The following portions of the patient's history were reviewed and updated as appropriate: allergies, current medications, past family history, past medical history, past social history, past surgical history and problem list.    Review of Systems   Constitutional: Negative for appetite change, fatigue and fever.   HENT: Negative for congestion, ear discharge, ear pain, sinus pressure and sore throat.    Eyes: Negative for pain and discharge.   Respiratory: Negative for cough, shortness of breath and wheezing.    Cardiovascular: Negative for chest pain, palpitations and leg swelling.   Gastrointestinal: Negative for abdominal pain, constipation, diarrhea, nausea and vomiting.   Endocrine: Negative for cold intolerance and heat intolerance.   Genitourinary: Negative for dysuria and flank pain.   Musculoskeletal: Negative for arthralgias and joint swelling.   Skin: Negative for pallor and rash.   Allergic/Immunologic: Negative for environmental allergies and food allergies.   Neurological: Negative for dizziness, weakness and numbness.   Hematological: Negative for adenopathy. Does not bruise/bleed easily.   Psychiatric/Behavioral: Negative for behavioral problems and dysphoric mood. The patient is not nervous/anxious.   "        Current Outpatient Medications:   •  Cholecalciferol (VITAMIN D-3) 1000 UNITS capsule, Take 1,000 Units by mouth Daily., Disp: , Rfl:   •  hydroCHLOROthiazide (HYDRODIURIL) 25 MG tablet, Take 1 tablet by mouth Daily., Disp: 90 tablet, Rfl: 2  •  metoprolol succinate XL (TOPROL-XL) 100 MG 24 hr tablet, Take 1 tablet by mouth Daily., Disp: 90 tablet, Rfl: 2  •  vitamin B-12 (CYANOCOBALAMIN) 1000 MCG tablet, Take 1,000 mcg by mouth Daily., Disp: , Rfl:     Objective     Blood pressure 138/82, pulse 60, temperature 98.1 °F (36.7 °C), resp. rate 16, height 170.2 cm (67.01\"), weight 110 kg (242 lb), SpO2 98 %.    Physical Exam   Constitutional: He is oriented to person, place, and time. He appears well-developed and well-nourished. No distress.   HENT:   Head: Normocephalic and atraumatic.   Right Ear: External ear normal.   Left Ear: External ear normal.   Nose: Nose normal.   Mouth/Throat: Oropharynx is clear and moist.   Eyes: Pupils are equal, round, and reactive to light. Conjunctivae and EOM are normal.   Neck: Normal range of motion. Neck supple. No thyromegaly present.   Cardiovascular: Normal rate, regular rhythm and normal heart sounds.   Pulmonary/Chest: Effort normal. No respiratory distress.   Abdominal: Soft. He exhibits no distension. There is no tenderness. There is no guarding.   Musculoskeletal: Normal range of motion. He exhibits no edema.   Lymphadenopathy:     He has no cervical adenopathy.   Neurological: He is alert and oriented to person, place, and time.   No gross motor or sensory deficits   Skin: Skin is warm and dry. He is not diaphoretic. No erythema.   Psychiatric: He has a normal mood and affect.   Nursing note and vitals reviewed.    Patient's Body mass index is 37.89 kg/m². BMI is above normal parameters. Recommendations include: educational material, exercise counseling and nutrition counseling.      Results for orders placed or performed in visit on 08/20/19   Comprehensive " Metabolic Panel   Result Value Ref Range    Glucose 124 (H) 65 - 99 mg/dL    BUN 12 6 - 20 mg/dL    Creatinine 1.13 0.76 - 1.27 mg/dL    eGFR Non African Am 70 >60 mL/min/1.73    eGFR African Am 85 >60 mL/min/1.73    BUN/Creatinine Ratio 10.6 7.0 - 25.0    Sodium 142 136 - 145 mmol/L    Potassium 4.1 3.5 - 5.2 mmol/L    Chloride 102 98 - 107 mmol/L    Total CO2 27.3 22.0 - 29.0 mmol/L    Calcium 9.5 8.6 - 10.5 mg/dL    Total Protein 6.9 6.0 - 8.5 g/dL    Albumin 4.70 3.50 - 5.20 g/dL    Globulin 2.2 gm/dL    A/G Ratio 2.1 g/dL    Total Bilirubin 0.5 0.2 - 1.2 mg/dL    Alkaline Phosphatase 58 39 - 117 U/L    AST (SGOT) 38 1 - 40 U/L    ALT (SGPT) 68 (H) 1 - 41 U/L   Lipid Panel   Result Value Ref Range    Total Cholesterol 168 0 - 200 mg/dL    Triglycerides 98 0 - 150 mg/dL    HDL Cholesterol 48 40 - 60 mg/dL    VLDL Cholesterol 19.6 mg/dL    LDL Cholesterol  100 0 - 100 mg/dL   Hemoglobin A1c   Result Value Ref Range    Hemoglobin A1C 6.00 (H) 4.80 - 5.60 %   CBC & Differential   Result Value Ref Range    WBC 7.74 3.40 - 10.80 10*3/mm3    RBC 4.97 4.14 - 5.80 10*6/mm3    Hemoglobin 14.9 13.0 - 17.7 g/dL    Hematocrit 43.3 37.5 - 51.0 %    MCV 87.1 79.0 - 97.0 fL    MCH 30.0 26.6 - 33.0 pg    MCHC 34.4 31.5 - 35.7 g/dL    RDW 12.8 12.3 - 15.4 %    Platelets 258 140 - 450 10*3/mm3    Neutrophil Rel % 59.1 42.7 - 76.0 %    Lymphocyte Rel % 28.8 19.6 - 45.3 %    Monocyte Rel % 8.4 5.0 - 12.0 %    Eosinophil Rel % 2.8 0.3 - 6.2 %    Basophil Rel % 0.5 0.0 - 1.5 %    Neutrophils Absolute 4.57 1.70 - 7.00 10*3/mm3    Lymphocytes Absolute 2.23 0.70 - 3.10 10*3/mm3    Monocytes Absolute 0.65 0.10 - 0.90 10*3/mm3    Eosinophils Absolute 0.22 0.00 - 0.40 10*3/mm3    Basophils Absolute 0.04 0.00 - 0.20 10*3/mm3    Immature Granulocyte Rel % 0.4 0.0 - 0.5 %    Immature Grans Absolute 0.03 0.00 - 0.05 10*3/mm3    nRBC 0.0 0.0 - 0.2 /100 WBC         Assessment/Plan   Zachery was seen today for hypertension, hyperlipidemia and blood  sugar problem.    Diagnoses and all orders for this visit:    Benign essential hypertension    Mixed hyperlipidemia  -     CBC & Differential  -     Comprehensive Metabolic Panel  -     Lipid Panel    Impaired fasting glucose  -     Hemoglobin A1c    Other orders  -     hydroCHLOROthiazide (HYDRODIURIL) 25 MG tablet; Take 1 tablet by mouth Daily.  -     metoprolol succinate XL (TOPROL-XL) 100 MG 24 hr tablet; Take 1 tablet by mouth Daily.        Plan:  1.  Benign essential hypertension: Will continue current medication, low-sodium diet advised  2.mixed hyperlipidemia:  reviewed  fasting CMP and lipid panel.  Diet and exercise counseled,   3. impaired glucose   :   reviewed  fasting CMP  and hba1c 6.0 , diet and exercise counseled            Keila Bolivar MD

## 2020-05-15 LAB
ALBUMIN SERPL-MCNC: 4.4 G/DL (ref 3.5–5.2)
ALBUMIN/GLOB SERPL: 2.2 G/DL
ALP SERPL-CCNC: 58 U/L (ref 39–117)
ALT SERPL-CCNC: 55 U/L (ref 1–41)
AST SERPL-CCNC: 28 U/L (ref 1–40)
BASOPHILS # BLD AUTO: 0.04 10*3/MM3 (ref 0–0.2)
BASOPHILS NFR BLD AUTO: 0.5 % (ref 0–1.5)
BILIRUB SERPL-MCNC: 0.6 MG/DL (ref 0.2–1.2)
BUN SERPL-MCNC: 11 MG/DL (ref 6–20)
BUN/CREAT SERPL: 10.1 (ref 7–25)
CALCIUM SERPL-MCNC: 9.5 MG/DL (ref 8.6–10.5)
CHLORIDE SERPL-SCNC: 103 MMOL/L (ref 98–107)
CHOLEST SERPL-MCNC: 164 MG/DL (ref 0–200)
CO2 SERPL-SCNC: 28.7 MMOL/L (ref 22–29)
CREAT SERPL-MCNC: 1.09 MG/DL (ref 0.76–1.27)
EOSINOPHIL # BLD AUTO: 0.14 10*3/MM3 (ref 0–0.4)
EOSINOPHIL NFR BLD AUTO: 1.9 % (ref 0.3–6.2)
ERYTHROCYTE [DISTWIDTH] IN BLOOD BY AUTOMATED COUNT: 12.3 % (ref 12.3–15.4)
GLOBULIN SER CALC-MCNC: 2 GM/DL
GLUCOSE SERPL-MCNC: 121 MG/DL (ref 65–99)
HBA1C MFR BLD: 6.1 % (ref 4.8–5.6)
HCT VFR BLD AUTO: 42.8 % (ref 37.5–51)
HDLC SERPL-MCNC: 49 MG/DL (ref 40–60)
HGB BLD-MCNC: 14.8 G/DL (ref 13–17.7)
IMM GRANULOCYTES # BLD AUTO: 0.03 10*3/MM3 (ref 0–0.05)
IMM GRANULOCYTES NFR BLD AUTO: 0.4 % (ref 0–0.5)
LDLC SERPL CALC-MCNC: 96 MG/DL (ref 0–100)
LYMPHOCYTES # BLD AUTO: 2.83 10*3/MM3 (ref 0.7–3.1)
LYMPHOCYTES NFR BLD AUTO: 38 % (ref 19.6–45.3)
MCH RBC QN AUTO: 30 PG (ref 26.6–33)
MCHC RBC AUTO-ENTMCNC: 34.6 G/DL (ref 31.5–35.7)
MCV RBC AUTO: 86.6 FL (ref 79–97)
MONOCYTES # BLD AUTO: 0.61 10*3/MM3 (ref 0.1–0.9)
MONOCYTES NFR BLD AUTO: 8.2 % (ref 5–12)
NEUTROPHILS # BLD AUTO: 3.79 10*3/MM3 (ref 1.7–7)
NEUTROPHILS NFR BLD AUTO: 51 % (ref 42.7–76)
NRBC BLD AUTO-RTO: 0 /100 WBC (ref 0–0.2)
PLATELET # BLD AUTO: 254 10*3/MM3 (ref 140–450)
POTASSIUM SERPL-SCNC: 4.2 MMOL/L (ref 3.5–5.2)
PROT SERPL-MCNC: 6.4 G/DL (ref 6–8.5)
RBC # BLD AUTO: 4.94 10*6/MM3 (ref 4.14–5.8)
SODIUM SERPL-SCNC: 142 MMOL/L (ref 136–145)
TRIGL SERPL-MCNC: 96 MG/DL (ref 0–150)
VLDLC SERPL CALC-MCNC: 19.2 MG/DL
WBC # BLD AUTO: 7.44 10*3/MM3 (ref 3.4–10.8)

## 2020-05-20 ENCOUNTER — OFFICE VISIT (OUTPATIENT)
Dept: INTERNAL MEDICINE | Facility: CLINIC | Age: 45
End: 2020-05-20

## 2020-05-20 VITALS
DIASTOLIC BLOOD PRESSURE: 80 MMHG | TEMPERATURE: 98.2 F | HEART RATE: 64 BPM | BODY MASS INDEX: 38.14 KG/M2 | SYSTOLIC BLOOD PRESSURE: 130 MMHG | OXYGEN SATURATION: 98 % | HEIGHT: 67 IN | WEIGHT: 243 LBS

## 2020-05-20 DIAGNOSIS — I10 BENIGN ESSENTIAL HYPERTENSION: Primary | ICD-10-CM

## 2020-05-20 DIAGNOSIS — R73.01 IMPAIRED FASTING GLUCOSE: ICD-10-CM

## 2020-05-20 DIAGNOSIS — E78.2 MIXED HYPERLIPIDEMIA: ICD-10-CM

## 2020-05-20 PROCEDURE — 99214 OFFICE O/P EST MOD 30 MIN: CPT | Performed by: INTERNAL MEDICINE

## 2020-05-20 RX ORDER — METOPROLOL SUCCINATE 100 MG/1
100 TABLET, EXTENDED RELEASE ORAL DAILY
Qty: 90 TABLET | Refills: 2 | Status: SHIPPED | OUTPATIENT
Start: 2020-05-20 | End: 2021-05-04 | Stop reason: SDUPTHER

## 2020-05-20 RX ORDER — HYDROCHLOROTHIAZIDE 25 MG/1
25 TABLET ORAL DAILY
Qty: 90 TABLET | Refills: 2 | Status: SHIPPED | OUTPATIENT
Start: 2020-05-20 | End: 2021-05-04 | Stop reason: SDUPTHER

## 2020-05-20 NOTE — PROGRESS NOTES
Subjective   Zachery Lomeli is a 44 y.o. male.     Chief Complaint   Patient presents with   • Hypertension   • Hyperlipidemia   • Blood Sugar Problem       History of Present Illness   HPI: Patient is here to follow up on the blood pressure  The patient is taking the blood pressure medications as prescribed and has had no side effects. The patient is also here to follow up on the cholesterol and  Sugar and is trying to follow a diet. The patient  Had lab work done .  The patient also needs refills on medications .   Hyperlipidemia   Pertinent negatives include no chest pain or shortness of breath.   Hypertension   Pertinent negatives include no chest pain, palpitations or shortness of breath.    The following portions of the patient's history were reviewed and updated as appropriate: allergies, current medications, past family history, past medical history, past social history, past surgical history and problem list.    Review of Systems   Constitutional: Negative for appetite change, fatigue and fever.   HENT: Negative for congestion, ear discharge, ear pain, sinus pressure and sore throat.    Eyes: Negative for pain and discharge.   Respiratory: Negative for cough, shortness of breath and wheezing.    Cardiovascular: Negative for chest pain, palpitations and leg swelling.   Gastrointestinal: Negative for abdominal pain, constipation, diarrhea, nausea and vomiting.   Endocrine: Negative for cold intolerance and heat intolerance.   Genitourinary: Negative for dysuria and flank pain.   Musculoskeletal: Negative for arthralgias and joint swelling.   Skin: Negative for pallor and rash.   Allergic/Immunologic: Negative for environmental allergies and food allergies.   Neurological: Negative for dizziness, weakness and numbness.   Hematological: Negative for adenopathy. Does not bruise/bleed easily.   Psychiatric/Behavioral: Negative for behavioral problems and dysphoric mood. The patient is not nervous/anxious.   "        Current Outpatient Medications:   •  Cholecalciferol (VITAMIN D-3) 1000 UNITS capsule, Take 1,000 Units by mouth Daily., Disp: , Rfl:   •  hydroCHLOROthiazide (HYDRODIURIL) 25 MG tablet, Take 1 tablet by mouth Daily., Disp: 90 tablet, Rfl: 2  •  metoprolol succinate XL (TOPROL-XL) 100 MG 24 hr tablet, Take 1 tablet by mouth Daily., Disp: 90 tablet, Rfl: 2  •  vitamin B-12 (CYANOCOBALAMIN) 1000 MCG tablet, Take 1,000 mcg by mouth Daily., Disp: , Rfl:     Objective     Blood pressure 130/80, pulse 64, temperature 98.2 °F (36.8 °C), temperature source Temporal, height 170.2 cm (67.01\"), weight 110 kg (243 lb), SpO2 98 %.    Physical Exam   Constitutional: He is oriented to person, place, and time. He appears well-developed and well-nourished. No distress.   HENT:   Head: Normocephalic and atraumatic.   Right Ear: External ear normal.   Left Ear: External ear normal.   Nose: Nose normal.   Mouth/Throat: Oropharynx is clear and moist.   Eyes: Pupils are equal, round, and reactive to light. Conjunctivae and EOM are normal.   Neck: Normal range of motion. Neck supple. No thyromegaly present.   Cardiovascular: Normal rate, regular rhythm and normal heart sounds.   Pulmonary/Chest: Effort normal. No respiratory distress.   Abdominal: Soft. He exhibits no distension. There is no tenderness. There is no guarding.   Musculoskeletal: Normal range of motion. He exhibits no edema.   Lymphadenopathy:     He has no cervical adenopathy.   Neurological: He is alert and oriented to person, place, and time.   No gross motor or sensory deficits   Skin: Skin is warm and dry. He is not diaphoretic. No erythema.   Psychiatric: He has a normal mood and affect.   Nursing note and vitals reviewed.    Patient's Body mass index is 38.05 kg/m². BMI is above normal parameters. Recommendations include: educational material, exercise counseling and nutrition counseling.      Results for orders placed or performed in visit on 01/20/20 "   Comprehensive Metabolic Panel   Result Value Ref Range    Glucose 121 (H) 65 - 99 mg/dL    BUN 11 6 - 20 mg/dL    Creatinine 1.09 0.76 - 1.27 mg/dL    eGFR Non African Am 73 >60 mL/min/1.73    eGFR African Am 89 >60 mL/min/1.73    BUN/Creatinine Ratio 10.1 7.0 - 25.0    Sodium 142 136 - 145 mmol/L    Potassium 4.2 3.5 - 5.2 mmol/L    Chloride 103 98 - 107 mmol/L    Total CO2 28.7 22.0 - 29.0 mmol/L    Calcium 9.5 8.6 - 10.5 mg/dL    Total Protein 6.4 6.0 - 8.5 g/dL    Albumin 4.40 3.50 - 5.20 g/dL    Globulin 2.0 gm/dL    A/G Ratio 2.2 g/dL    Total Bilirubin 0.6 0.2 - 1.2 mg/dL    Alkaline Phosphatase 58 39 - 117 U/L    AST (SGOT) 28 1 - 40 U/L    ALT (SGPT) 55 (H) 1 - 41 U/L   Lipid Panel   Result Value Ref Range    Total Cholesterol 164 0 - 200 mg/dL    Triglycerides 96 0 - 150 mg/dL    HDL Cholesterol 49 40 - 60 mg/dL    VLDL Cholesterol 19.2 mg/dL    LDL Cholesterol  96 0 - 100 mg/dL   Hemoglobin A1c   Result Value Ref Range    Hemoglobin A1C 6.10 (H) 4.80 - 5.60 %   CBC & Differential   Result Value Ref Range    WBC 7.44 3.40 - 10.80 10*3/mm3    RBC 4.94 4.14 - 5.80 10*6/mm3    Hemoglobin 14.8 13.0 - 17.7 g/dL    Hematocrit 42.8 37.5 - 51.0 %    MCV 86.6 79.0 - 97.0 fL    MCH 30.0 26.6 - 33.0 pg    MCHC 34.6 31.5 - 35.7 g/dL    RDW 12.3 12.3 - 15.4 %    Platelets 254 140 - 450 10*3/mm3    Neutrophil Rel % 51.0 42.7 - 76.0 %    Lymphocyte Rel % 38.0 19.6 - 45.3 %    Monocyte Rel % 8.2 5.0 - 12.0 %    Eosinophil Rel % 1.9 0.3 - 6.2 %    Basophil Rel % 0.5 0.0 - 1.5 %    Neutrophils Absolute 3.79 1.70 - 7.00 10*3/mm3    Lymphocytes Absolute 2.83 0.70 - 3.10 10*3/mm3    Monocytes Absolute 0.61 0.10 - 0.90 10*3/mm3    Eosinophils Absolute 0.14 0.00 - 0.40 10*3/mm3    Basophils Absolute 0.04 0.00 - 0.20 10*3/mm3    Immature Granulocyte Rel % 0.4 0.0 - 0.5 %    Immature Grans Absolute 0.03 0.00 - 0.05 10*3/mm3    nRBC 0.0 0.0 - 0.2 /100 WBC         Assessment/Plan   Zachery was seen today for hypertension,  hyperlipidemia and blood sugar problem.    Diagnoses and all orders for this visit:    Benign essential hypertension    Mixed hyperlipidemia  -     CBC & Differential  -     Comprehensive Metabolic Panel  -     Lipid Panel    Impaired fasting glucose  -     Hemoglobin A1c    Other orders  -     metoprolol succinate XL (TOPROL-XL) 100 MG 24 hr tablet; Take 1 tablet by mouth Daily.  -     hydroCHLOROthiazide (HYDRODIURIL) 25 MG tablet; Take 1 tablet by mouth Daily.        Plan:  1.  Benign essential hypertension: Will continue current medication, low-sodium diet advised, Counseled to regularly check BP at home with goal averaging <130/80.   2.mixed hyperlipidemia:  reviewed  fasting CMP and lipid panel.  Diet and exercise counseled,  Will continue current medications  3. impaired glucose   :   reviewed  fasting CMP  and hba1c  , diet and exercise counseled              Keila Bolivar MD

## 2021-03-30 ENCOUNTER — IMMUNIZATION (OUTPATIENT)
Dept: VACCINE CLINIC | Facility: HOSPITAL | Age: 46
End: 2021-03-30

## 2021-03-30 PROCEDURE — 0001A: CPT | Performed by: INTERNAL MEDICINE

## 2021-03-30 PROCEDURE — 91300 HC SARSCOV02 VAC 30MCG/0.3ML IM: CPT | Performed by: INTERNAL MEDICINE

## 2021-04-20 ENCOUNTER — IMMUNIZATION (OUTPATIENT)
Dept: VACCINE CLINIC | Facility: HOSPITAL | Age: 46
End: 2021-04-20

## 2021-04-20 ENCOUNTER — TELEPHONE (OUTPATIENT)
Dept: INTERNAL MEDICINE | Facility: CLINIC | Age: 46
End: 2021-04-20

## 2021-04-20 PROCEDURE — 0002A: CPT | Performed by: INTERNAL MEDICINE

## 2021-04-20 PROCEDURE — 91300 HC SARSCOV02 VAC 30MCG/0.3ML IM: CPT | Performed by: INTERNAL MEDICINE

## 2021-04-20 NOTE — TELEPHONE ENCOUNTER
Caller: Zachery Lomeli    Relationship: Self    Best call back number: [phone]    What orders are you requesting (i.e. lab or imaging): LAB WORK     In what timeframe would the patient need to come in: BEFORE HIS PHYSICAL     Where will you receive your lab/imaging services: IN OFFICE

## 2021-05-01 LAB
ALBUMIN SERPL-MCNC: 4.7 G/DL (ref 3.5–5.2)
ALBUMIN/GLOB SERPL: 2.4 G/DL
ALP SERPL-CCNC: 63 U/L (ref 39–117)
ALT SERPL-CCNC: 62 U/L (ref 1–41)
AST SERPL-CCNC: 37 U/L (ref 1–40)
BASOPHILS # BLD AUTO: 0.04 10*3/MM3 (ref 0–0.2)
BASOPHILS NFR BLD AUTO: 0.5 % (ref 0–1.5)
BILIRUB SERPL-MCNC: 0.6 MG/DL (ref 0–1.2)
BUN SERPL-MCNC: 13 MG/DL (ref 6–20)
BUN/CREAT SERPL: 11.5 (ref 7–25)
CALCIUM SERPL-MCNC: 10 MG/DL (ref 8.6–10.5)
CHLORIDE SERPL-SCNC: 103 MMOL/L (ref 98–107)
CHOLEST SERPL-MCNC: 178 MG/DL (ref 0–200)
CO2 SERPL-SCNC: 29.5 MMOL/L (ref 22–29)
CREAT SERPL-MCNC: 1.13 MG/DL (ref 0.76–1.27)
EOSINOPHIL # BLD AUTO: 0.13 10*3/MM3 (ref 0–0.4)
EOSINOPHIL NFR BLD AUTO: 1.7 % (ref 0.3–6.2)
ERYTHROCYTE [DISTWIDTH] IN BLOOD BY AUTOMATED COUNT: 12.7 % (ref 12.3–15.4)
GLOBULIN SER CALC-MCNC: 2 GM/DL
GLUCOSE SERPL-MCNC: 126 MG/DL (ref 65–99)
HBA1C MFR BLD: 6.5 % (ref 4.8–5.6)
HCT VFR BLD AUTO: 44.2 % (ref 37.5–51)
HDLC SERPL-MCNC: 52 MG/DL (ref 40–60)
HGB BLD-MCNC: 15.5 G/DL (ref 13–17.7)
IMM GRANULOCYTES # BLD AUTO: 0.04 10*3/MM3 (ref 0–0.05)
IMM GRANULOCYTES NFR BLD AUTO: 0.5 % (ref 0–0.5)
LDLC SERPL CALC-MCNC: 111 MG/DL (ref 0–100)
LYMPHOCYTES # BLD AUTO: 2.13 10*3/MM3 (ref 0.7–3.1)
LYMPHOCYTES NFR BLD AUTO: 27.7 % (ref 19.6–45.3)
MCH RBC QN AUTO: 30.6 PG (ref 26.6–33)
MCHC RBC AUTO-ENTMCNC: 35.1 G/DL (ref 31.5–35.7)
MCV RBC AUTO: 87.2 FL (ref 79–97)
MONOCYTES # BLD AUTO: 0.58 10*3/MM3 (ref 0.1–0.9)
MONOCYTES NFR BLD AUTO: 7.6 % (ref 5–12)
NEUTROPHILS # BLD AUTO: 4.76 10*3/MM3 (ref 1.7–7)
NEUTROPHILS NFR BLD AUTO: 62 % (ref 42.7–76)
NRBC BLD AUTO-RTO: 0 /100 WBC (ref 0–0.2)
PLATELET # BLD AUTO: 275 10*3/MM3 (ref 140–450)
POTASSIUM SERPL-SCNC: 4.2 MMOL/L (ref 3.5–5.2)
PROT SERPL-MCNC: 6.7 G/DL (ref 6–8.5)
RBC # BLD AUTO: 5.07 10*6/MM3 (ref 4.14–5.8)
SODIUM SERPL-SCNC: 142 MMOL/L (ref 136–145)
TRIGL SERPL-MCNC: 82 MG/DL (ref 0–150)
VLDLC SERPL CALC-MCNC: 15 MG/DL (ref 5–40)
WBC # BLD AUTO: 7.68 10*3/MM3 (ref 3.4–10.8)

## 2021-05-04 ENCOUNTER — OFFICE VISIT (OUTPATIENT)
Dept: INTERNAL MEDICINE | Facility: CLINIC | Age: 46
End: 2021-05-04

## 2021-05-04 VITALS
DIASTOLIC BLOOD PRESSURE: 66 MMHG | SYSTOLIC BLOOD PRESSURE: 113 MMHG | HEART RATE: 69 BPM | OXYGEN SATURATION: 98 % | WEIGHT: 245 LBS | TEMPERATURE: 97.8 F | RESPIRATION RATE: 16 BRPM | HEIGHT: 67 IN | BODY MASS INDEX: 38.45 KG/M2

## 2021-05-04 DIAGNOSIS — Z00.00 ROUTINE GENERAL MEDICAL EXAMINATION AT A HEALTH CARE FACILITY: Primary | ICD-10-CM

## 2021-05-04 DIAGNOSIS — E78.2 MIXED HYPERLIPIDEMIA: ICD-10-CM

## 2021-05-04 DIAGNOSIS — E66.9 OBESITY (BMI 30-39.9): ICD-10-CM

## 2021-05-04 DIAGNOSIS — R74.8 ELEVATED LIVER ENZYMES: ICD-10-CM

## 2021-05-04 DIAGNOSIS — I10 BENIGN ESSENTIAL HYPERTENSION: ICD-10-CM

## 2021-05-04 DIAGNOSIS — E11.9 CONTROLLED TYPE 2 DIABETES MELLITUS WITHOUT COMPLICATION, WITHOUT LONG-TERM CURRENT USE OF INSULIN (HCC): ICD-10-CM

## 2021-05-04 PROCEDURE — 99396 PREV VISIT EST AGE 40-64: CPT | Performed by: INTERNAL MEDICINE

## 2021-05-04 RX ORDER — METOPROLOL SUCCINATE 100 MG/1
100 TABLET, EXTENDED RELEASE ORAL DAILY
Qty: 90 TABLET | Refills: 2 | Status: SHIPPED | OUTPATIENT
Start: 2021-05-04 | End: 2021-10-06 | Stop reason: HOSPADM

## 2021-05-04 RX ORDER — HYDROCHLOROTHIAZIDE 25 MG/1
25 TABLET ORAL DAILY
Qty: 90 TABLET | Refills: 2 | Status: SHIPPED | OUTPATIENT
Start: 2021-05-04 | End: 2021-10-28
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
## 2021-05-04 NOTE — PATIENT INSTRUCTIONS
MyPlate from USDA    MyPlate is an outline of a general healthy diet based on the 2010 Dietary Guidelines for Americans, from the U.S. Department of Agriculture (USDA). It sets guidelines for how much food you should eat from each food group based on your age, sex, and level of physical activity.  What are tips for following MyPlate?  To follow MyPlate recommendations:  · Eat a wide variety of fruits and vegetables, grains, and protein foods.  · Serve smaller portions and eat less food throughout the day.  · Limit portion sizes to avoid overeating.  · Enjoy your food.  · Get at least 150 minutes of exercise every week. This is about 30 minutes each day, 5 or more days per week.  It can be difficult to have every meal look like MyPlate. Think about MyPlate as eating guidelines for an entire day, rather than each individual meal.  Fruits and vegetables  · Make half of your plate fruits and vegetables.  · Eat many different colors of fruits and vegetables each day.  · For a 2,000 calorie daily food plan, eat:  ? 2½ cups of vegetables every day.  ? 2 cups of fruit every day.  · 1 cup is equal to:  ? 1 cup raw or cooked vegetables.  ? 1 cup raw fruit.  ? 1 medium-sized orange, apple, or banana.  ? 1 cup 100% fruit or vegetable juice.  ? 2 cups raw leafy greens, such as lettuce, spinach, or kale.  ? ½ cup dried fruit.  Grains  · One fourth of your plate should be grains.  · Make at least half of the grains you eat each day whole grains.  · For a 2,000 calorie daily food plan, eat 6 oz of grains every day.  · 1 oz is equal to:  ? 1 slice bread.  ? 1 cup cereal.  ? ½ cup cooked rice, cereal, or pasta.  Protein  · One fourth of your plate should be protein.  · Eat a wide variety of protein foods, including meat, poultry, fish, eggs, beans, nuts, and tofu.  · For a 2,000 calorie daily food plan, eat 5½ oz of protein every day.  · 1 oz is equal to:  ? 1 oz meat, poultry, or fish.  ? ¼ cup cooked beans.  ? 1 egg.  ? ½ oz nuts  or seeds.  ? 1 Tbsp peanut butter.  Dairy  · Drink fat-free or low-fat (1%) milk.  · Eat or drink dairy as a side to meals.  · For a 2,000 calorie daily food plan, eat or drink 3 cups of dairy every day.  · 1 cup is equal to:  ? 1 cup milk, yogurt, cottage cheese, or soy milk (soy beverage).  ? 2 oz processed cheese.  ? 1½ oz natural cheese.  Fats, oils, salt, and sugars  · Only small amounts of oils are recommended.  · Avoid foods that are high in calories and low in nutritional value (empty calories), like foods high in fat or added sugars.  · Choose foods that are low in salt (sodium). Choose foods that have less than 140 milligrams (mg) of sodium per serving.  · Drink water instead of sugary drinks. Drink enough water each day to keep your urine pale yellow.  Where to find support  · Work with your health care provider or a nutrition specialist (dietitian) to develop a customized eating plan that is right for you.  · Download an aracelis (mobile application) to help you track your daily food intake.  Where to find more information  · Go to ChooseMyPlate.gov for more information.  Summary  · MyPlate is a general guideline for healthy eating from the USDA. It is based on the 2010 Dietary Guidelines for Americans.  · In general, fruits and vegetables should take up ½ of your plate, grains should take up ¼ of your plate, and protein should take up ¼ of your plate.  This information is not intended to replace advice given to you by your health care provider. Make sure you discuss any questions you have with your health care provider.  Document Revised: 05/21/2020 Document Reviewed: 03/19/2018  Elsevier Patient Education © 2021 [employer].

## 2021-08-21 LAB
ALBUMIN SERPL-MCNC: 4.6 G/DL (ref 3.5–5.2)
ALBUMIN/GLOB SERPL: 2.2 G/DL
ALP SERPL-CCNC: 64 U/L (ref 39–117)
ALT SERPL-CCNC: 41 U/L (ref 1–41)
AST SERPL-CCNC: 23 U/L (ref 1–40)
BASOPHILS # BLD AUTO: 0.04 10*3/MM3 (ref 0–0.2)
BASOPHILS NFR BLD AUTO: 0.5 % (ref 0–1.5)
BILIRUB SERPL-MCNC: 0.6 MG/DL (ref 0–1.2)
BUN SERPL-MCNC: 13 MG/DL (ref 6–20)
BUN/CREAT SERPL: 13.3 (ref 7–25)
CALCIUM SERPL-MCNC: 9.8 MG/DL (ref 8.6–10.5)
CHLORIDE SERPL-SCNC: 104 MMOL/L (ref 98–107)
CHOLEST SERPL-MCNC: 164 MG/DL (ref 0–200)
CO2 SERPL-SCNC: 26.9 MMOL/L (ref 22–29)
CREAT SERPL-MCNC: 0.98 MG/DL (ref 0.76–1.27)
EOSINOPHIL # BLD AUTO: 0.14 10*3/MM3 (ref 0–0.4)
EOSINOPHIL NFR BLD AUTO: 1.8 % (ref 0.3–6.2)
ERYTHROCYTE [DISTWIDTH] IN BLOOD BY AUTOMATED COUNT: 12.6 % (ref 12.3–15.4)
GLOBULIN SER CALC-MCNC: 2.1 GM/DL
GLUCOSE SERPL-MCNC: 112 MG/DL (ref 65–99)
HBA1C MFR BLD: 6 % (ref 4.8–5.6)
HCT VFR BLD AUTO: 43 % (ref 37.5–51)
HDLC SERPL-MCNC: 48 MG/DL (ref 40–60)
HGB BLD-MCNC: 14.8 G/DL (ref 13–17.7)
IMM GRANULOCYTES # BLD AUTO: 0.03 10*3/MM3 (ref 0–0.05)
IMM GRANULOCYTES NFR BLD AUTO: 0.4 % (ref 0–0.5)
LDLC SERPL CALC-MCNC: 97 MG/DL (ref 0–100)
LYMPHOCYTES # BLD AUTO: 2.11 10*3/MM3 (ref 0.7–3.1)
LYMPHOCYTES NFR BLD AUTO: 27.6 % (ref 19.6–45.3)
MCH RBC QN AUTO: 29.8 PG (ref 26.6–33)
MCHC RBC AUTO-ENTMCNC: 34.4 G/DL (ref 31.5–35.7)
MCV RBC AUTO: 86.5 FL (ref 79–97)
MONOCYTES # BLD AUTO: 0.53 10*3/MM3 (ref 0.1–0.9)
MONOCYTES NFR BLD AUTO: 6.9 % (ref 5–12)
NEUTROPHILS # BLD AUTO: 4.8 10*3/MM3 (ref 1.7–7)
NEUTROPHILS NFR BLD AUTO: 62.8 % (ref 42.7–76)
NRBC BLD AUTO-RTO: 0 /100 WBC (ref 0–0.2)
PLATELET # BLD AUTO: 244 10*3/MM3 (ref 140–450)
POTASSIUM SERPL-SCNC: 4.5 MMOL/L (ref 3.5–5.2)
PROT SERPL-MCNC: 6.7 G/DL (ref 6–8.5)
RBC # BLD AUTO: 4.97 10*6/MM3 (ref 4.14–5.8)
SODIUM SERPL-SCNC: 141 MMOL/L (ref 136–145)
TRIGL SERPL-MCNC: 102 MG/DL (ref 0–150)
VLDLC SERPL CALC-MCNC: 19 MG/DL (ref 5–40)
WBC # BLD AUTO: 7.65 10*3/MM3 (ref 3.4–10.8)

## 2021-08-24 ENCOUNTER — OFFICE VISIT (OUTPATIENT)
Dept: INTERNAL MEDICINE | Facility: CLINIC | Age: 46
End: 2021-08-24

## 2021-08-24 VITALS
TEMPERATURE: 97.6 F | WEIGHT: 238 LBS | BODY MASS INDEX: 37.35 KG/M2 | HEART RATE: 64 BPM | DIASTOLIC BLOOD PRESSURE: 76 MMHG | RESPIRATION RATE: 14 BRPM | OXYGEN SATURATION: 98 % | HEIGHT: 67 IN | SYSTOLIC BLOOD PRESSURE: 129 MMHG

## 2021-08-24 DIAGNOSIS — E11.9 CONTROLLED TYPE 2 DIABETES MELLITUS WITHOUT COMPLICATION, WITHOUT LONG-TERM CURRENT USE OF INSULIN (HCC): ICD-10-CM

## 2021-08-24 DIAGNOSIS — I10 BENIGN ESSENTIAL HYPERTENSION: Primary | ICD-10-CM

## 2021-08-24 DIAGNOSIS — E78.2 MIXED HYPERLIPIDEMIA: ICD-10-CM

## 2021-08-24 PROCEDURE — 99214 OFFICE O/P EST MOD 30 MIN: CPT | Performed by: INTERNAL MEDICINE

## 2021-08-24 NOTE — PROGRESS NOTES
"Chief Complaint  Hypertension, Hyperlipidemia, and Diabetes    Subjective          Zachery Lomeli presents to [facility]  History of Present Illness  HPI: Patient is here to follow up on the blood pressure  The patient is taking the blood pressure medications as prescribed and has had no side effects. The patient is also here to follow up on the cholesterol and is trying to follow a diet. The patient is  also here to follow up on sugar and had lab work done .  He has lost weight, the patient also needs refills on medications .   Hyperlipidemia   Pertinent negatives include no chest pain or shortness of breath.   Hypertension   Pertinent negatives include no chest pain, palpitations or shortness of breath.    Objective   Vital Signs:   /76   Pulse 64   Temp 97.6 °F (36.4 °C)   Resp 14   Ht 170.2 cm (67.01\")   Wt 108 kg (238 lb)   SpO2 98%   BMI 37.27 kg/m²     Physical Exam  Vitals and nursing note reviewed.   Constitutional:       General: He is not in acute distress.     Appearance: Normal appearance. He is not diaphoretic.   HENT:      Head: Normocephalic and atraumatic.      Right Ear: External ear normal.      Left Ear: External ear normal.      Nose: Nose normal.   Eyes:      Extraocular Movements: Extraocular movements intact.      Conjunctiva/sclera: Conjunctivae normal.   Neck:      Trachea: Trachea normal.   Cardiovascular:      Rate and Rhythm: Normal rate and regular rhythm.      Heart sounds: Normal heart sounds.   Pulmonary:      Effort: Pulmonary effort is normal. No respiratory distress.   Abdominal:      General: Abdomen is flat.   Musculoskeletal:      Cervical back: Neck supple.      Comments: Moves all limbs   Skin:     General: Skin is warm and dry.      Findings: No erythema.   Neurological:      Mental Status: He is alert and oriented to person, place, and time.      Comments: No gross motor or sensory deficits        Result Review :     Common labs  "   Common Labsle 4/30/21 4/30/21 4/30/21 4/30/21 8/20/21 8/20/21 8/20/21 8/20/21    0818 0818 0818 0818 0823 0823 0823 0823   Glucose  126 (A)    112 (A)     BUN  13    13     Creatinine  1.13    0.98     eGFR Non African Am  70    82     eGFR  Am  85    100     Sodium  142    141     Potassium  4.2    4.5     Chloride  103    104     Calcium  10.0    9.8     Total Protein  6.7    6.7     Albumin  4.70    4.60     Total Bilirubin  0.6    0.6     Alkaline Phosphatase  63    64     AST (SGOT)  37    23     ALT (SGPT)  62 (A)    41     WBC 7.68    7.65      Hemoglobin 15.5    14.8      Hematocrit 44.2    43.0      Platelets 275    244      Total Cholesterol   178    164    Triglycerides   82    102    HDL Cholesterol   52    48    LDL Cholesterol    111 (A)    97    Hemoglobin A1C    6.50 (A)    6.00 (A)   (A) Abnormal value       Comments are available for some flowsheets but are not being displayed.                     Assessment and Plan    Diagnoses and all orders for this visit:    1. Benign essential hypertension (Primary)    2. Controlled type 2 diabetes mellitus without complication, without long-term current use of insulin (CMS/[facility])  -     Hemoglobin A1c    3. Mixed hyperlipidemia  -     CBC & Differential  -     Comprehensive Metabolic Panel  -     Lipid Panel    Plan:  1.  Benign essential hypertension: Will continue current medication, low-sodium diet advised, Counseled to regularly check BP at home with goal averaging <130/80.   2.mixed hyperlipidemia: Reviewed   fasting CMP and lipid panel.  Diet and exercise counseled,  Will continue current medications  3. Diabetes  Mellitus  : Reviewed fasting CMP  and hba1c   6.0, diet and exercise counseled ,     I spent 30 minutes caring for Zachery on this date of service. This time includes time spent by me in the following activities:preparing for the visit, reviewing tests, performing a medically appropriate examination and/or evaluation , counseling and  educating the patient/family/caregiver, ordering medications, tests, or procedures and documenting information in the medical record  Follow Up   Return in about 4 months (around 12/15/2021).  Patient was given instructions and counseling regarding his condition or for health maintenance advice. Please see specific information pulled into the AVS if appropriate.

## 2021-10-04 ENCOUNTER — APPOINTMENT (OUTPATIENT)
Dept: GENERAL RADIOLOGY | Facility: HOSPITAL | Age: 46
End: 2021-10-04

## 2021-10-04 ENCOUNTER — HOSPITAL ENCOUNTER (OUTPATIENT)
Facility: HOSPITAL | Age: 46
Discharge: HOME | End: 2021-10-06
Attending: EMERGENCY MEDICINE | Admitting: FAMILY MEDICINE

## 2021-10-04 DIAGNOSIS — R07.9 CHEST PAIN, UNSPECIFIED TYPE: Primary | ICD-10-CM

## 2021-10-04 LAB
ALBUMIN SERPL-MCNC: 5 G/DL (ref 3.5–5.2)
ALBUMIN/GLOB SERPL: 2.2 G/DL
ALP SERPL-CCNC: 76 U/L (ref 39–117)
ALT SERPL W P-5'-P-CCNC: 44 U/L (ref 1–41)
ANION GAP SERPL CALCULATED.3IONS-SCNC: 12.1 MMOL/L (ref 5–15)
AST SERPL-CCNC: 31 U/L (ref 1–40)
BASOPHILS # BLD AUTO: 0.03 10*3/MM3 (ref 0–0.2)
BASOPHILS NFR BLD AUTO: 0.4 % (ref 0–1.5)
BILIRUB SERPL-MCNC: 0.6 MG/DL (ref 0–1.2)
BUN SERPL-MCNC: 13 MG/DL (ref 6–20)
BUN/CREAT SERPL: 10.2 (ref 7–25)
CALCIUM SPEC-SCNC: 9.6 MG/DL (ref 8.6–10.5)
CHLORIDE SERPL-SCNC: 101 MMOL/L (ref 98–107)
CO2 SERPL-SCNC: 27.9 MMOL/L (ref 22–29)
CREAT SERPL-MCNC: 1.27 MG/DL (ref 0.76–1.27)
DEPRECATED RDW RBC AUTO: 36.8 FL (ref 37–54)
EOSINOPHIL # BLD AUTO: 0.17 10*3/MM3 (ref 0–0.4)
EOSINOPHIL NFR BLD AUTO: 2.2 % (ref 0.3–6.2)
ERYTHROCYTE [DISTWIDTH] IN BLOOD BY AUTOMATED COUNT: 11.9 % (ref 12.3–15.4)
GFR SERPL CREATININE-BSD FRML MDRD: 61 ML/MIN/1.73
GLOBULIN UR ELPH-MCNC: 2.3 GM/DL
GLUCOSE SERPL-MCNC: 166 MG/DL (ref 65–99)
HCT VFR BLD AUTO: 46.6 % (ref 37.5–51)
HGB BLD-MCNC: 16.5 G/DL (ref 13–17.7)
HOLD SPECIMEN: NORMAL
HOLD SPECIMEN: NORMAL
IMM GRANULOCYTES # BLD AUTO: 0.03 10*3/MM3 (ref 0–0.05)
IMM GRANULOCYTES NFR BLD AUTO: 0.4 % (ref 0–0.5)
LYMPHOCYTES # BLD AUTO: 1.39 10*3/MM3 (ref 0.7–3.1)
LYMPHOCYTES NFR BLD AUTO: 18.3 % (ref 19.6–45.3)
MCH RBC QN AUTO: 30.2 PG (ref 26.6–33)
MCHC RBC AUTO-ENTMCNC: 35.4 G/DL (ref 31.5–35.7)
MCV RBC AUTO: 85.3 FL (ref 79–97)
MONOCYTES # BLD AUTO: 1.13 10*3/MM3 (ref 0.1–0.9)
MONOCYTES NFR BLD AUTO: 14.9 % (ref 5–12)
NEUTROPHILS NFR BLD AUTO: 4.84 10*3/MM3 (ref 1.7–7)
NEUTROPHILS NFR BLD AUTO: 63.8 % (ref 42.7–76)
NRBC BLD AUTO-RTO: 0 /100 WBC (ref 0–0.2)
PLATELET # BLD AUTO: 263 10*3/MM3 (ref 140–450)
PMV BLD AUTO: 9.3 FL (ref 6–12)
POTASSIUM SERPL-SCNC: 4.1 MMOL/L (ref 3.5–5.2)
PROT SERPL-MCNC: 7.3 G/DL (ref 6–8.5)
RBC # BLD AUTO: 5.46 10*6/MM3 (ref 4.14–5.8)
SODIUM SERPL-SCNC: 141 MMOL/L (ref 136–145)
TROPONIN T SERPL-MCNC: <0.01 NG/ML (ref 0–0.03)
TROPONIN T SERPL-MCNC: <0.01 NG/ML (ref 0–0.03)
WBC # BLD AUTO: 7.59 10*3/MM3 (ref 3.4–10.8)
WHOLE BLOOD HOLD SPECIMEN: NORMAL
WHOLE BLOOD HOLD SPECIMEN: NORMAL

## 2021-10-04 PROCEDURE — 93005 ELECTROCARDIOGRAM TRACING: CPT | Performed by: EMERGENCY MEDICINE

## 2021-10-04 PROCEDURE — 99285 EMERGENCY DEPT VISIT HI MDM: CPT

## 2021-10-04 PROCEDURE — 84484 ASSAY OF TROPONIN QUANT: CPT | Performed by: EMERGENCY MEDICINE

## 2021-10-04 PROCEDURE — 96375 TX/PRO/DX INJ NEW DRUG ADDON: CPT

## 2021-10-04 PROCEDURE — 25010000002 MORPHINE PER 10 MG: Performed by: EMERGENCY MEDICINE

## 2021-10-04 PROCEDURE — 71045 X-RAY EXAM CHEST 1 VIEW: CPT

## 2021-10-04 PROCEDURE — 85025 COMPLETE CBC W/AUTO DIFF WBC: CPT

## 2021-10-04 PROCEDURE — 93005 ELECTROCARDIOGRAM TRACING: CPT

## 2021-10-04 PROCEDURE — 84484 ASSAY OF TROPONIN QUANT: CPT

## 2021-10-04 PROCEDURE — 25010000002 ONDANSETRON PER 1 MG: Performed by: EMERGENCY MEDICINE

## 2021-10-04 PROCEDURE — 80061 LIPID PANEL: CPT | Performed by: INTERNAL MEDICINE

## 2021-10-04 PROCEDURE — 80053 COMPREHEN METABOLIC PANEL: CPT

## 2021-10-04 RX ORDER — ASPIRIN 325 MG
325 TABLET ORAL ONCE
Status: COMPLETED | OUTPATIENT
Start: 2021-10-04 | End: 2021-10-04

## 2021-10-04 RX ORDER — SODIUM CHLORIDE 0.9 % (FLUSH) 0.9 %
10 SYRINGE (ML) INJECTION AS NEEDED
Status: DISCONTINUED | OUTPATIENT
Start: 2021-10-04 | End: 2021-10-06 | Stop reason: HOSPADM

## 2021-10-04 RX ORDER — MORPHINE SULFATE 4 MG/ML
4 INJECTION, SOLUTION INTRAMUSCULAR; INTRAVENOUS ONCE
Status: COMPLETED | OUTPATIENT
Start: 2021-10-04 | End: 2021-10-04

## 2021-10-04 RX ORDER — NITROGLYCERIN 0.4 MG/1
0.4 TABLET SUBLINGUAL
Status: DISCONTINUED | OUTPATIENT
Start: 2021-10-04 | End: 2021-10-06 | Stop reason: HOSPADM

## 2021-10-04 RX ORDER — ONDANSETRON 2 MG/ML
4 INJECTION INTRAMUSCULAR; INTRAVENOUS ONCE
Status: COMPLETED | OUTPATIENT
Start: 2021-10-04 | End: 2021-10-04

## 2021-10-04 RX ADMIN — ONDANSETRON 4 MG: 2 INJECTION INTRAMUSCULAR; INTRAVENOUS at 23:44

## 2021-10-04 RX ADMIN — MORPHINE SULFATE 4 MG: 4 INJECTION, SOLUTION INTRAMUSCULAR; INTRAVENOUS at 23:45

## 2021-10-04 RX ADMIN — ASPIRIN 325 MG ORAL TABLET 325 MG: 325 PILL ORAL at 20:56

## 2021-10-05 ENCOUNTER — APPOINTMENT (OUTPATIENT)
Dept: CARDIOLOGY | Facility: HOSPITAL | Age: 46
End: 2021-10-05

## 2021-10-05 PROBLEM — I21.4 NSTEMI (NON-ST ELEVATED MYOCARDIAL INFARCTION) (HCC): Status: ACTIVE | Noted: 2021-10-05

## 2021-10-05 PROBLEM — R07.9 CHEST PAIN: Status: ACTIVE | Noted: 2021-10-05

## 2021-10-05 LAB
ACT BLD: 239 SECONDS (ref 82–152)
ANION GAP SERPL CALCULATED.3IONS-SCNC: 10 MMOL/L (ref 5–15)
APTT PPP: 117.6 SECONDS (ref 70–100)
APTT PPP: 32.1 SECONDS (ref 70–100)
APTT PPP: 93.6 SECONDS (ref 70–100)
BASOPHILS # BLD AUTO: 0.04 10*3/MM3 (ref 0–0.2)
BASOPHILS # BLD AUTO: 0.04 10*3/MM3 (ref 0–0.2)
BASOPHILS NFR BLD AUTO: 0.5 % (ref 0–1.5)
BASOPHILS NFR BLD AUTO: 0.5 % (ref 0–1.5)
BH CV ECHO MEAS - % IVS THICK: 37.1 %
BH CV ECHO MEAS - % LVPW THICK: 41.6 %
BH CV ECHO MEAS - AO MAX PG (FULL): 6.9 MMHG
BH CV ECHO MEAS - AO MAX PG: 10 MMHG
BH CV ECHO MEAS - AO MEAN PG (FULL): 5 MMHG
BH CV ECHO MEAS - AO MEAN PG: 6 MMHG
BH CV ECHO MEAS - AO ROOT AREA (BSA CORRECTED): 1.3
BH CV ECHO MEAS - AO ROOT AREA: 6.3 CM^2
BH CV ECHO MEAS - AO ROOT DIAM: 2.8 CM
BH CV ECHO MEAS - AO V2 MAX: 160 CM/SEC
BH CV ECHO MEAS - AO V2 MEAN: 112 CM/SEC
BH CV ECHO MEAS - AO V2 VTI: 33.2 CM
BH CV ECHO MEAS - AVA(I,A): 1.9 CM^2
BH CV ECHO MEAS - AVA(I,D): 1.9 CM^2
BH CV ECHO MEAS - AVA(V,A): 1.9 CM^2
BH CV ECHO MEAS - AVA(V,D): 1.9 CM^2
BH CV ECHO MEAS - BSA(HAYCOCK): 2.3 M^2
BH CV ECHO MEAS - BSA: 2.2 M^2
BH CV ECHO MEAS - BZI_BMI: 36.3 KILOGRAMS/M^2
BH CV ECHO MEAS - BZI_METRIC_HEIGHT: 170.2 CM
BH CV ECHO MEAS - BZI_METRIC_WEIGHT: 105.2 KG
BH CV ECHO MEAS - EDV(CUBED): 93 ML
BH CV ECHO MEAS - EDV(MOD-SP2): 93.1 ML
BH CV ECHO MEAS - EDV(MOD-SP4): 88.7 ML
BH CV ECHO MEAS - EDV(TEICH): 93.9 ML
BH CV ECHO MEAS - EF(CUBED): 75.6 %
BH CV ECHO MEAS - EF(MOD-BP): 68 %
BH CV ECHO MEAS - EF(MOD-SP2): 50.3 %
BH CV ECHO MEAS - EF(MOD-SP4): 69.6 %
BH CV ECHO MEAS - EF(TEICH): 67.7 %
BH CV ECHO MEAS - ESV(CUBED): 22.7 ML
BH CV ECHO MEAS - ESV(MOD-SP2): 46.3 ML
BH CV ECHO MEAS - ESV(MOD-SP4): 27 ML
BH CV ECHO MEAS - ESV(TEICH): 30.3 ML
BH CV ECHO MEAS - FS: 37.5 %
BH CV ECHO MEAS - IVS/LVPW: 1.4
BH CV ECHO MEAS - IVSD: 1.4 CM
BH CV ECHO MEAS - IVSS: 2 CM
BH CV ECHO MEAS - LA DIMENSION: 3.9 CM
BH CV ECHO MEAS - LA/AO: 1.4
BH CV ECHO MEAS - LAD MAJOR: 6.4 CM
BH CV ECHO MEAS - LAT PEAK E' VEL: 14.6 CM/SEC
BH CV ECHO MEAS - LATERAL E/E' RATIO: 5.1
BH CV ECHO MEAS - LV DIASTOLIC VOL/BSA (35-75): 41.2 ML/M^2
BH CV ECHO MEAS - LV MASS(C)D: 205 GRAMS
BH CV ECHO MEAS - LV MASS(C)DI: 95.2 GRAMS/M^2
BH CV ECHO MEAS - LV MASS(C)S: 182 GRAMS
BH CV ECHO MEAS - LV MASS(C)SI: 84.5 GRAMS/M^2
BH CV ECHO MEAS - LV MAX PG: 3.1 MMHG
BH CV ECHO MEAS - LV MEAN PG: 1 MMHG
BH CV ECHO MEAS - LV SYSTOLIC VOL/BSA (12-30): 12.5 ML/M^2
BH CV ECHO MEAS - LV V1 MAX: 87.4 CM/SEC
BH CV ECHO MEAS - LV V1 MEAN: 52.4 CM/SEC
BH CV ECHO MEAS - LV V1 VTI: 18.4 CM
BH CV ECHO MEAS - LVIDD: 4.5 CM
BH CV ECHO MEAS - LVIDS: 2.8 CM
BH CV ECHO MEAS - LVLD AP2: 8.1 CM
BH CV ECHO MEAS - LVLD AP4: 8.6 CM
BH CV ECHO MEAS - LVLS AP2: 7.2 CM
BH CV ECHO MEAS - LVLS AP4: 6.7 CM
BH CV ECHO MEAS - LVOT AREA (M): 3.5 CM^2
BH CV ECHO MEAS - LVOT AREA: 3.5 CM^2
BH CV ECHO MEAS - LVOT DIAM: 2.1 CM
BH CV ECHO MEAS - LVPWD: 1 CM
BH CV ECHO MEAS - LVPWS: 1.4 CM
BH CV ECHO MEAS - MED PEAK E' VEL: 10.2 CM/SEC
BH CV ECHO MEAS - MEDIAL E/E' RATIO: 7.3
BH CV ECHO MEAS - MV A MAX VEL: 54.5 CM/SEC
BH CV ECHO MEAS - MV DEC TIME: 0.36 SEC
BH CV ECHO MEAS - MV E MAX VEL: 74.7 CM/SEC
BH CV ECHO MEAS - MV E/A: 1.4
BH CV ECHO MEAS - MV MAX PG: 4.3 MMHG
BH CV ECHO MEAS - MV MEAN PG: 1 MMHG
BH CV ECHO MEAS - MV V2 MAX: 104 CM/SEC
BH CV ECHO MEAS - MV V2 MEAN: 45.9 CM/SEC
BH CV ECHO MEAS - MV V2 VTI: 40.1 CM
BH CV ECHO MEAS - MVA(VTI): 1.6 CM^2
BH CV ECHO MEAS - PA MAX PG: 3.1 MMHG
BH CV ECHO MEAS - PA MEAN PG: 2 MMHG
BH CV ECHO MEAS - PA V2 MAX: 88.5 CM/SEC
BH CV ECHO MEAS - PA V2 MEAN: 57 CM/SEC
BH CV ECHO MEAS - PA V2 VTI: 16.8 CM
BH CV ECHO MEAS - PULM A REVS DUR: 0.15 SEC
BH CV ECHO MEAS - PULM A REVS VEL: 26.1 CM/SEC
BH CV ECHO MEAS - PULM DIAS VEL: 40.5 CM/SEC
BH CV ECHO MEAS - PULM S/D: 1.2
BH CV ECHO MEAS - PULM SYS VEL: 47.9 CM/SEC
BH CV ECHO MEAS - RVSP: 26 MMHG
BH CV ECHO MEAS - SI(AO): 97.6 ML/M^2
BH CV ECHO MEAS - SI(CUBED): 32.6 ML/M^2
BH CV ECHO MEAS - SI(LVOT): 29.9 ML/M^2
BH CV ECHO MEAS - SI(MOD-SP2): 21.7 ML/M^2
BH CV ECHO MEAS - SI(MOD-SP4): 28.6 ML/M^2
BH CV ECHO MEAS - SI(TEICH): 29.5 ML/M^2
BH CV ECHO MEAS - SV(AO): 210.3 ML
BH CV ECHO MEAS - SV(CUBED): 70.3 ML
BH CV ECHO MEAS - SV(LVOT): 64.3 ML
BH CV ECHO MEAS - SV(MOD-SP2): 46.8 ML
BH CV ECHO MEAS - SV(MOD-SP4): 61.7 ML
BH CV ECHO MEAS - SV(TEICH): 63.6 ML
BH CV ECHO MEAS - TAPSE (>1.6): 2.5 CM
BH CV ECHO MEAS - TR MAX PG: 16 MMHG
BH CV ECHO MEAS - TR MAX VEL: 200 CM/SEC
BH CV ECHO MEASUREMENTS AVERAGE E/E' RATIO: 6.02
BH CV XLRA - RV BASE: 3.9 CM
BH CV XLRA - RV LENGTH: 7.5 CM
BH CV XLRA - RV MID: 2.8 CM
BH CV XLRA - TDI S': 11.9 CM/SEC
BUN SERPL-MCNC: 15 MG/DL (ref 6–20)
BUN/CREAT SERPL: 14 (ref 7–25)
CALCIUM SPEC-SCNC: 9.2 MG/DL (ref 8.6–10.5)
CHLORIDE SERPL-SCNC: 103 MMOL/L (ref 98–107)
CHOLEST SERPL-MCNC: 146 MG/DL (ref 0–200)
CO2 SERPL-SCNC: 25 MMOL/L (ref 22–29)
CREAT SERPL-MCNC: 1.07 MG/DL (ref 0.76–1.27)
DEPRECATED RDW RBC AUTO: 35.9 FL (ref 37–54)
DEPRECATED RDW RBC AUTO: 36.4 FL (ref 37–54)
EOSINOPHIL # BLD AUTO: 0.02 10*3/MM3 (ref 0–0.4)
EOSINOPHIL # BLD AUTO: 0.09 10*3/MM3 (ref 0–0.4)
EOSINOPHIL NFR BLD AUTO: 0.3 % (ref 0.3–6.2)
EOSINOPHIL NFR BLD AUTO: 1 % (ref 0.3–6.2)
ERYTHROCYTE [DISTWIDTH] IN BLOOD BY AUTOMATED COUNT: 11.9 % (ref 12.3–15.4)
ERYTHROCYTE [DISTWIDTH] IN BLOOD BY AUTOMATED COUNT: 11.9 % (ref 12.3–15.4)
GFR SERPL CREATININE-BSD FRML MDRD: 74 ML/MIN/1.73
GLUCOSE SERPL-MCNC: 126 MG/DL (ref 65–99)
HCT VFR BLD AUTO: 41.9 % (ref 37.5–51)
HCT VFR BLD AUTO: 43.6 % (ref 37.5–51)
HDLC SERPL-MCNC: 36 MG/DL (ref 40–60)
HGB BLD-MCNC: 14.8 G/DL (ref 13–17.7)
HGB BLD-MCNC: 15.6 G/DL (ref 13–17.7)
IMM GRANULOCYTES # BLD AUTO: 0.03 10*3/MM3 (ref 0–0.05)
IMM GRANULOCYTES # BLD AUTO: 0.05 10*3/MM3 (ref 0–0.05)
IMM GRANULOCYTES NFR BLD AUTO: 0.4 % (ref 0–0.5)
IMM GRANULOCYTES NFR BLD AUTO: 0.6 % (ref 0–0.5)
INR PPP: 0.99 (ref 0.9–1.1)
LDLC SERPL CALC-MCNC: 87 MG/DL (ref 0–100)
LDLC/HDLC SERPL: 2.34 {RATIO}
LEFT ATRIUM VOLUME INDEX: 20.7 ML/M^2
LEFT ATRIUM VOLUME: 44.6 ML
LYMPHOCYTES # BLD AUTO: 1.29 10*3/MM3 (ref 0.7–3.1)
LYMPHOCYTES # BLD AUTO: 1.67 10*3/MM3 (ref 0.7–3.1)
LYMPHOCYTES NFR BLD AUTO: 17.6 % (ref 19.6–45.3)
LYMPHOCYTES NFR BLD AUTO: 19.1 % (ref 19.6–45.3)
MAXIMAL PREDICTED HEART RATE: 174 BPM
MCH RBC QN AUTO: 29.7 PG (ref 26.6–33)
MCH RBC QN AUTO: 30 PG (ref 26.6–33)
MCHC RBC AUTO-ENTMCNC: 35.3 G/DL (ref 31.5–35.7)
MCHC RBC AUTO-ENTMCNC: 35.8 G/DL (ref 31.5–35.7)
MCV RBC AUTO: 83 FL (ref 79–97)
MCV RBC AUTO: 85 FL (ref 79–97)
MONOCYTES # BLD AUTO: 0.69 10*3/MM3 (ref 0.1–0.9)
MONOCYTES # BLD AUTO: 1.07 10*3/MM3 (ref 0.1–0.9)
MONOCYTES NFR BLD AUTO: 12.3 % (ref 5–12)
MONOCYTES NFR BLD AUTO: 9.4 % (ref 5–12)
NEUTROPHILS NFR BLD AUTO: 5.28 10*3/MM3 (ref 1.7–7)
NEUTROPHILS NFR BLD AUTO: 5.81 10*3/MM3 (ref 1.7–7)
NEUTROPHILS NFR BLD AUTO: 66.5 % (ref 42.7–76)
NEUTROPHILS NFR BLD AUTO: 71.8 % (ref 42.7–76)
NRBC BLD AUTO-RTO: 0 /100 WBC (ref 0–0.2)
NRBC BLD AUTO-RTO: 0 /100 WBC (ref 0–0.2)
PLATELET # BLD AUTO: 254 10*3/MM3 (ref 140–450)
PLATELET # BLD AUTO: 259 10*3/MM3 (ref 140–450)
PMV BLD AUTO: 9.2 FL (ref 6–12)
PMV BLD AUTO: 9.7 FL (ref 6–12)
POTASSIUM SERPL-SCNC: 4.1 MMOL/L (ref 3.5–5.2)
PROTHROMBIN TIME: 13.6 SECONDS (ref 12–15.1)
RBC # BLD AUTO: 4.93 10*6/MM3 (ref 4.14–5.8)
RBC # BLD AUTO: 5.25 10*6/MM3 (ref 4.14–5.8)
SARS-COV-2 RNA PNL SPEC NAA+PROBE: NOT DETECTED
SODIUM SERPL-SCNC: 138 MMOL/L (ref 136–145)
STRESS TARGET HR: 148 BPM
TRIGL SERPL-MCNC: 129 MG/DL (ref 0–150)
TROPONIN T SERPL-MCNC: 0.04 NG/ML (ref 0–0.03)
TROPONIN T SERPL-MCNC: 0.09 NG/ML (ref 0–0.03)
VLDLC SERPL-MCNC: 23 MG/DL (ref 5–40)
WBC # BLD AUTO: 7.35 10*3/MM3 (ref 3.4–10.8)
WBC # BLD AUTO: 8.73 10*3/MM3 (ref 3.4–10.8)

## 2021-10-05 PROCEDURE — 93454 CORONARY ARTERY ANGIO S&I: CPT | Performed by: INTERNAL MEDICINE

## 2021-10-05 PROCEDURE — C1769 GUIDE WIRE: HCPCS | Performed by: INTERNAL MEDICINE

## 2021-10-05 PROCEDURE — C1874 STENT, COATED/COV W/DEL SYS: HCPCS | Performed by: INTERNAL MEDICINE

## 2021-10-05 PROCEDURE — 25010000003 LIDOCAINE 1 % SOLUTION: Performed by: INTERNAL MEDICINE

## 2021-10-05 PROCEDURE — 92928 PRQ TCAT PLMT NTRAC ST 1 LES: CPT | Performed by: INTERNAL MEDICINE

## 2021-10-05 PROCEDURE — C1887 CATHETER, GUIDING: HCPCS | Performed by: INTERNAL MEDICINE

## 2021-10-05 PROCEDURE — C9600 PERC DRUG-EL COR STENT SING: HCPCS | Performed by: INTERNAL MEDICINE

## 2021-10-05 PROCEDURE — 85347 COAGULATION TIME ACTIVATED: CPT

## 2021-10-05 PROCEDURE — C1760 CLOSURE DEV, VASC: HCPCS | Performed by: INTERNAL MEDICINE

## 2021-10-05 PROCEDURE — 25010000002 HEPARIN (PORCINE) PER 1000 UNITS: Performed by: INTERNAL MEDICINE

## 2021-10-05 PROCEDURE — 85025 COMPLETE CBC W/AUTO DIFF WBC: CPT | Performed by: EMERGENCY MEDICINE

## 2021-10-05 PROCEDURE — C1725 CATH, TRANSLUMIN NON-LASER: HCPCS | Performed by: INTERNAL MEDICINE

## 2021-10-05 PROCEDURE — 0 IOPAMIDOL PER 1 ML: Performed by: INTERNAL MEDICINE

## 2021-10-05 PROCEDURE — 80048 BASIC METABOLIC PNL TOTAL CA: CPT | Performed by: INTERNAL MEDICINE

## 2021-10-05 PROCEDURE — 25010000002 MIDAZOLAM PER 1MG: Performed by: INTERNAL MEDICINE

## 2021-10-05 PROCEDURE — G0378 HOSPITAL OBSERVATION PER HR: HCPCS

## 2021-10-05 PROCEDURE — 25010000002 FENTANYL CITRATE (PF) 50 MCG/ML SOLUTION: Performed by: INTERNAL MEDICINE

## 2021-10-05 PROCEDURE — 93005 ELECTROCARDIOGRAM TRACING: CPT | Performed by: EMERGENCY MEDICINE

## 2021-10-05 PROCEDURE — 85025 COMPLETE CBC W/AUTO DIFF WBC: CPT | Performed by: INTERNAL MEDICINE

## 2021-10-05 PROCEDURE — C1894 INTRO/SHEATH, NON-LASER: HCPCS | Performed by: INTERNAL MEDICINE

## 2021-10-05 PROCEDURE — 87635 SARS-COV-2 COVID-19 AMP PRB: CPT | Performed by: EMERGENCY MEDICINE

## 2021-10-05 PROCEDURE — 96376 TX/PRO/DX INJ SAME DRUG ADON: CPT

## 2021-10-05 PROCEDURE — 96365 THER/PROPH/DIAG IV INF INIT: CPT

## 2021-10-05 PROCEDURE — 84484 ASSAY OF TROPONIN QUANT: CPT | Performed by: INTERNAL MEDICINE

## 2021-10-05 PROCEDURE — 99204 OFFICE O/P NEW MOD 45 MIN: CPT | Performed by: INTERNAL MEDICINE

## 2021-10-05 PROCEDURE — 85730 THROMBOPLASTIN TIME PARTIAL: CPT | Performed by: INTERNAL MEDICINE

## 2021-10-05 PROCEDURE — 99219 PR INITIAL OBSERVATION CARE/DAY 50 MINUTES: CPT | Performed by: INTERNAL MEDICINE

## 2021-10-05 PROCEDURE — 96366 THER/PROPH/DIAG IV INF ADDON: CPT

## 2021-10-05 PROCEDURE — 85730 THROMBOPLASTIN TIME PARTIAL: CPT | Performed by: EMERGENCY MEDICINE

## 2021-10-05 PROCEDURE — 93306 TTE W/DOPPLER COMPLETE: CPT

## 2021-10-05 PROCEDURE — C9803 HOPD COVID-19 SPEC COLLECT: HCPCS

## 2021-10-05 PROCEDURE — 85610 PROTHROMBIN TIME: CPT | Performed by: EMERGENCY MEDICINE

## 2021-10-05 DEVICE — XIENCE SIERRA™ EVEROLIMUS ELUTING CORONARY STENT SYSTEM 2.50 MM X 38 MM / RAPID-EXCHANGE
Type: IMPLANTABLE DEVICE | Status: FUNCTIONAL
Brand: XIENCE SIERRA™

## 2021-10-05 DEVICE — XIENCE SIERRA™ EVEROLIMUS ELUTING CORONARY STENT SYSTEM 3.50 MM X 18 MM / RAPID-EXCHANGE
Type: IMPLANTABLE DEVICE | Status: FUNCTIONAL
Brand: XIENCE SIERRA™

## 2021-10-05 DEVICE — XIENCE SIERRA™ EVEROLIMUS ELUTING CORONARY STENT SYSTEM 3.50 MM X 23 MM / RAPID-EXCHANGE
Type: IMPLANTABLE DEVICE | Status: FUNCTIONAL
Brand: XIENCE SIERRA™

## 2021-10-05 RX ORDER — ASPIRIN 325 MG
TABLET ORAL AS NEEDED
Status: DISCONTINUED | OUTPATIENT
Start: 2021-10-05 | End: 2021-10-05 | Stop reason: HOSPADM

## 2021-10-05 RX ORDER — CHOLECALCIFEROL (VITAMIN D3) 125 MCG
1000 CAPSULE ORAL DAILY
Status: DISCONTINUED | OUTPATIENT
Start: 2021-10-05 | End: 2021-10-06 | Stop reason: HOSPADM

## 2021-10-05 RX ORDER — HEPARIN SODIUM 1000 [USP'U]/ML
INJECTION, SOLUTION INTRAVENOUS; SUBCUTANEOUS AS NEEDED
Status: DISCONTINUED | OUTPATIENT
Start: 2021-10-05 | End: 2021-10-05 | Stop reason: HOSPADM

## 2021-10-05 RX ORDER — MORPHINE SULFATE 2 MG/ML
1 INJECTION, SOLUTION INTRAMUSCULAR; INTRAVENOUS EVERY 4 HOURS PRN
Status: DISCONTINUED | OUTPATIENT
Start: 2021-10-05 | End: 2021-10-06 | Stop reason: HOSPADM

## 2021-10-05 RX ORDER — HYDROCHLOROTHIAZIDE 25 MG/1
25 TABLET ORAL DAILY
Status: DISCONTINUED | OUTPATIENT
Start: 2021-10-05 | End: 2021-10-06 | Stop reason: HOSPADM

## 2021-10-05 RX ORDER — SODIUM CHLORIDE 0.9 % (FLUSH) 0.9 %
3-10 SYRINGE (ML) INJECTION AS NEEDED
Status: DISCONTINUED | OUTPATIENT
Start: 2021-10-05 | End: 2021-10-06 | Stop reason: HOSPADM

## 2021-10-05 RX ORDER — ALPRAZOLAM 0.25 MG/1
0.25 TABLET ORAL 3 TIMES DAILY PRN
Status: DISCONTINUED | OUTPATIENT
Start: 2021-10-05 | End: 2021-10-06 | Stop reason: HOSPADM

## 2021-10-05 RX ORDER — METOPROLOL SUCCINATE 25 MG/1
25 TABLET, EXTENDED RELEASE ORAL DAILY
Status: DISCONTINUED | OUTPATIENT
Start: 2021-10-05 | End: 2021-10-06 | Stop reason: HOSPADM

## 2021-10-05 RX ORDER — ACETAMINOPHEN 325 MG/1
650 TABLET ORAL EVERY 4 HOURS PRN
Status: DISCONTINUED | OUTPATIENT
Start: 2021-10-05 | End: 2021-10-06 | Stop reason: HOSPADM

## 2021-10-05 RX ORDER — ONDANSETRON 4 MG/1
4 TABLET, FILM COATED ORAL EVERY 6 HOURS PRN
Status: DISCONTINUED | OUTPATIENT
Start: 2021-10-05 | End: 2021-10-06 | Stop reason: HOSPADM

## 2021-10-05 RX ORDER — MELATONIN
1000 DAILY
Status: DISCONTINUED | OUTPATIENT
Start: 2021-10-05 | End: 2021-10-06 | Stop reason: HOSPADM

## 2021-10-05 RX ORDER — TEMAZEPAM 15 MG/1
15 CAPSULE ORAL NIGHTLY PRN
Status: DISCONTINUED | OUTPATIENT
Start: 2021-10-05 | End: 2021-10-06 | Stop reason: HOSPADM

## 2021-10-05 RX ORDER — SODIUM CHLORIDE 9 MG/ML
100 INJECTION, SOLUTION INTRAVENOUS CONTINUOUS
Status: ACTIVE | OUTPATIENT
Start: 2021-10-05 | End: 2021-10-05

## 2021-10-05 RX ORDER — SODIUM CHLORIDE 0.9 % (FLUSH) 0.9 %
3 SYRINGE (ML) INJECTION EVERY 12 HOURS SCHEDULED
Status: DISCONTINUED | OUTPATIENT
Start: 2021-10-05 | End: 2021-10-06 | Stop reason: HOSPADM

## 2021-10-05 RX ORDER — NALOXONE HCL 0.4 MG/ML
0.4 VIAL (ML) INJECTION
Status: DISCONTINUED | OUTPATIENT
Start: 2021-10-05 | End: 2021-10-06 | Stop reason: HOSPADM

## 2021-10-05 RX ORDER — ASPIRIN 81 MG/1
81 TABLET, CHEWABLE ORAL DAILY
Status: DISCONTINUED | OUTPATIENT
Start: 2021-10-05 | End: 2021-10-06 | Stop reason: HOSPADM

## 2021-10-05 RX ORDER — SODIUM CHLORIDE 9 MG/ML
100 INJECTION, SOLUTION INTRAVENOUS CONTINUOUS
Status: DISCONTINUED | OUTPATIENT
Start: 2021-10-05 | End: 2021-10-06 | Stop reason: HOSPADM

## 2021-10-05 RX ORDER — DIPHENHYDRAMINE HYDROCHLORIDE 50 MG/ML
25 INJECTION INTRAMUSCULAR; INTRAVENOUS EVERY 6 HOURS PRN
Status: DISCONTINUED | OUTPATIENT
Start: 2021-10-05 | End: 2021-10-06 | Stop reason: HOSPADM

## 2021-10-05 RX ORDER — ONDANSETRON 2 MG/ML
4 INJECTION INTRAMUSCULAR; INTRAVENOUS EVERY 6 HOURS PRN
Status: DISCONTINUED | OUTPATIENT
Start: 2021-10-05 | End: 2021-10-06 | Stop reason: HOSPADM

## 2021-10-05 RX ORDER — ACETAMINOPHEN 160 MG/5ML
650 SOLUTION ORAL EVERY 4 HOURS PRN
Status: DISCONTINUED | OUTPATIENT
Start: 2021-10-05 | End: 2021-10-06 | Stop reason: HOSPADM

## 2021-10-05 RX ORDER — SODIUM CHLORIDE 0.9 % (FLUSH) 0.9 %
10 SYRINGE (ML) INJECTION AS NEEDED
Status: DISCONTINUED | OUTPATIENT
Start: 2021-10-05 | End: 2021-10-06 | Stop reason: HOSPADM

## 2021-10-05 RX ORDER — CHOLECALCIFEROL (VITAMIN D3) 125 MCG
5 CAPSULE ORAL NIGHTLY PRN
Status: DISCONTINUED | OUTPATIENT
Start: 2021-10-05 | End: 2021-10-06 | Stop reason: HOSPADM

## 2021-10-05 RX ORDER — HEPARIN SODIUM 1000 [USP'U]/ML
30 INJECTION INTRAVENOUS; SUBCUTANEOUS AS NEEDED
Status: DISCONTINUED | OUTPATIENT
Start: 2021-10-05 | End: 2021-10-06 | Stop reason: HOSPADM

## 2021-10-05 RX ORDER — HYDROCODONE BITARTRATE AND ACETAMINOPHEN 5; 325 MG/1; MG/1
1 TABLET ORAL EVERY 4 HOURS PRN
Status: DISCONTINUED | OUTPATIENT
Start: 2021-10-05 | End: 2021-10-06 | Stop reason: HOSPADM

## 2021-10-05 RX ORDER — ALUMINA, MAGNESIA, AND SIMETHICONE 2400; 2400; 240 MG/30ML; MG/30ML; MG/30ML
15 SUSPENSION ORAL EVERY 6 HOURS PRN
Status: DISCONTINUED | OUTPATIENT
Start: 2021-10-05 | End: 2021-10-06 | Stop reason: HOSPADM

## 2021-10-05 RX ORDER — HEPARIN SODIUM 1000 [USP'U]/ML
5000 INJECTION INTRAVENOUS; SUBCUTANEOUS AS NEEDED
Status: DISCONTINUED | OUTPATIENT
Start: 2021-10-05 | End: 2021-10-06 | Stop reason: HOSPADM

## 2021-10-05 RX ORDER — MORPHINE SULFATE 4 MG/ML
4 INJECTION, SOLUTION INTRAMUSCULAR; INTRAVENOUS
Status: DISCONTINUED | OUTPATIENT
Start: 2021-10-05 | End: 2021-10-06 | Stop reason: HOSPADM

## 2021-10-05 RX ORDER — FENTANYL CITRATE 50 UG/ML
INJECTION, SOLUTION INTRAMUSCULAR; INTRAVENOUS AS NEEDED
Status: DISCONTINUED | OUTPATIENT
Start: 2021-10-05 | End: 2021-10-05 | Stop reason: HOSPADM

## 2021-10-05 RX ORDER — HEPARIN SODIUM 10000 [USP'U]/100ML
1000 INJECTION, SOLUTION INTRAVENOUS
Status: DISCONTINUED | OUTPATIENT
Start: 2021-10-05 | End: 2021-10-06 | Stop reason: HOSPADM

## 2021-10-05 RX ORDER — ATORVASTATIN CALCIUM 80 MG/1
80 TABLET, FILM COATED ORAL NIGHTLY
Status: DISCONTINUED | OUTPATIENT
Start: 2021-10-05 | End: 2021-10-06 | Stop reason: HOSPADM

## 2021-10-05 RX ORDER — SODIUM CHLORIDE 0.9 % (FLUSH) 0.9 %
10 SYRINGE (ML) INJECTION EVERY 12 HOURS SCHEDULED
Status: DISCONTINUED | OUTPATIENT
Start: 2021-10-05 | End: 2021-10-06 | Stop reason: HOSPADM

## 2021-10-05 RX ORDER — LIDOCAINE HYDROCHLORIDE 10 MG/ML
INJECTION, SOLUTION INFILTRATION; PERINEURAL AS NEEDED
Status: DISCONTINUED | OUTPATIENT
Start: 2021-10-05 | End: 2021-10-05 | Stop reason: HOSPADM

## 2021-10-05 RX ORDER — ACETAMINOPHEN 650 MG/1
650 SUPPOSITORY RECTAL EVERY 4 HOURS PRN
Status: DISCONTINUED | OUTPATIENT
Start: 2021-10-05 | End: 2021-10-06 | Stop reason: HOSPADM

## 2021-10-05 RX ORDER — METOPROLOL SUCCINATE 25 MG/1
25 TABLET, EXTENDED RELEASE ORAL DAILY
Status: DISCONTINUED | OUTPATIENT
Start: 2021-10-06 | End: 2021-10-05

## 2021-10-05 RX ORDER — MIDAZOLAM HYDROCHLORIDE 2 MG/2ML
INJECTION, SOLUTION INTRAMUSCULAR; INTRAVENOUS AS NEEDED
Status: DISCONTINUED | OUTPATIENT
Start: 2021-10-05 | End: 2021-10-05 | Stop reason: HOSPADM

## 2021-10-05 RX ORDER — METOPROLOL SUCCINATE 100 MG/1
100 TABLET, EXTENDED RELEASE ORAL DAILY
Status: DISCONTINUED | OUTPATIENT
Start: 2021-10-05 | End: 2021-10-05

## 2021-10-05 RX ORDER — HEPARIN SODIUM 1000 [USP'U]/ML
5000 INJECTION, SOLUTION INTRAVENOUS; SUBCUTANEOUS ONCE
Status: COMPLETED | OUTPATIENT
Start: 2021-10-05 | End: 2021-10-05

## 2021-10-05 RX ADMIN — NITROGLYCERIN 0.4 MG: 0.4 TABLET SUBLINGUAL at 02:27

## 2021-10-05 RX ADMIN — SODIUM CHLORIDE, PRESERVATIVE FREE 10 ML: 5 INJECTION INTRAVENOUS at 21:14

## 2021-10-05 RX ADMIN — HEPARIN SODIUM 5000 UNITS: 1000 INJECTION INTRAVENOUS; SUBCUTANEOUS at 02:20

## 2021-10-05 RX ADMIN — ATORVASTATIN CALCIUM 80 MG: 80 TABLET, FILM COATED ORAL at 21:14

## 2021-10-05 RX ADMIN — HYDROCODONE BITARTRATE AND ACETAMINOPHEN 1 TABLET: 5; 325 TABLET ORAL at 16:22

## 2021-10-05 RX ADMIN — SODIUM CHLORIDE 100 ML/HR: 9 INJECTION, SOLUTION INTRAVENOUS at 02:38

## 2021-10-05 RX ADMIN — TEMAZEPAM 15 MG: 15 CAPSULE ORAL at 21:14

## 2021-10-05 RX ADMIN — SODIUM CHLORIDE, PRESERVATIVE FREE 10 ML: 5 INJECTION INTRAVENOUS at 09:20

## 2021-10-05 RX ADMIN — SODIUM CHLORIDE 100 ML/HR: 9 INJECTION, SOLUTION INTRAVENOUS at 13:30

## 2021-10-05 RX ADMIN — METOPROLOL SUCCINATE 25 MG: 25 TABLET, EXTENDED RELEASE ORAL at 17:56

## 2021-10-05 RX ADMIN — TICAGRELOR 90 MG: 90 TABLET ORAL at 21:14

## 2021-10-05 RX ADMIN — NITROGLYCERIN 0.4 MG: 0.4 TABLET SUBLINGUAL at 00:07

## 2021-10-05 RX ADMIN — SODIUM CHLORIDE, PRESERVATIVE FREE 3 ML: 5 INJECTION INTRAVENOUS at 21:14

## 2021-10-05 RX ADMIN — SODIUM CHLORIDE, PRESERVATIVE FREE 3 ML: 5 INJECTION INTRAVENOUS at 02:26

## 2021-10-05 RX ADMIN — SODIUM CHLORIDE, PRESERVATIVE FREE 3 ML: 5 INJECTION INTRAVENOUS at 09:20

## 2021-10-05 RX ADMIN — HEPARIN SODIUM AND DEXTROSE 12 UNITS/KG/HR: 10000; 5 INJECTION INTRAVENOUS at 02:25

## 2021-10-05 NOTE — H&P
[facility]   HISTORY AND PHYSICAL      Name:  Zachery Lomeli   Age:  46 y.o.  Sex:  male  :  1975  MRN:  [id number]   Visit Number:  [id number]  Admission Date:  10/4/2021  Date Of Service:  10/05/21  Primary Care Physician:  Keila Bolivar MD    Chief Complaint:   Chest pain    History Of Presenting Illness:    Patient is a pleasant 46-year-old male with history of hypertension, prediabetes and obesity who presented to the ER today with complaints of chest pain.  Patient reports the pain is sharp in nature, midsternal radiating to bilateral shoulders and down the arms, intermittent, worse with exertion and better with rest.  This pain has been going on for the last 2 months but was much worse this evening which prompted him to come to the ER to be evaluated.  The pain was not associated with any nausea or vomiting.  However it should be noted that the patient reports what sounds to be possibly food poisoning since yesterday afternoon after having fast food outside.  He had multiple episodes of diarrhea throughout the night but stopped this afternoon.  He has not had any fever or chills.  Patient was initially worked up with 2 sets of troponin levels and EKG which were unremarkable and he was discharged from the ER.  However on his way out from the ER, the chest pain returned and the patient became very diaphoretic and anxious, therefore he was brought back to his room.  He was then given morphine and nitroglycerin which significantly improved his pain and symptoms.  Dr. Farias was contacted by the ER provider and recommended that the patient be started on heparin drip and he will likely undergo left heart cath versus stress test in the morning.    Patient's vitals in the ER are notable for blood pressure 186/115, normal heart rate, respiratory rate and SPO2.  Labs are fairly unremarkable.  Chest x-ray does not reveal any acute pathology.    Patient is a former smoker and smoked half  a pack of cigarettes a day, quit 3 years ago.  He does however vape frequently.  He does not use any recreational drugs.  He drinks alcohol socially.    Review Of Systems:    All systems were reviewed and negative except as mentioned in history of presenting illness, assessment and plan.    Past Medical History: Patient  has a past medical history of At risk for sleep apnea, Colon polyp (06/17/2019), Diarrhea, GERD (gastroesophageal reflux disease), History of bronchitis, History of pneumonia, Hypertension, Kidney stones, Low back pain, and Rectal bleeding.    Past Surgical History: Patient  has a past surgical history that includes Cyst Removal; Nose surgery; [place] tooth extraction; and Colonoscopy (N/A, 6/17/2019).    Social History: Patient  reports that he quit smoking about 3 years ago. His smoking use included cigarettes and electronic cigarette. He has a 11.00 pack-year smoking history. He quit smokeless tobacco use about 5 years ago.  His smokeless tobacco use included snuff. He reports that he does not drink alcohol and does not use drugs.    Family History: Patient's family history is notable for CVA, heart disease and valve replacement and his mom and some form of arrhythmia in his diet.    Allergies:      Patient has no known allergies.    Home Medications:    Prior to Admission Medications     Prescriptions Last Dose Informant Patient Reported? Taking?    Cholecalciferol (VITAMIN D-3) 1000 UNITS capsule  Spouse/Significant Other Yes No    Take 1,000 Units by mouth Daily.    hydroCHLOROthiazide (HYDRODIURIL) 25 MG tablet   No No    Take 1 tablet by mouth Daily.    metoprolol succinate XL (TOPROL-XL) 100 MG 24 hr tablet   No No    Take 1 tablet by mouth Daily.    vitamin B-12 (CYANOCOBALAMIN) 1000 MCG tablet  Spouse/Significant Other Yes No    Take 1,000 mcg by mouth Daily.        ED Medications:    Medications   sodium chloride 0.9 % flush 10 mL (has no administration in time range)   nitroglycerin  (NITROSTAT) SL tablet 0.4 mg (0.4 mg Sublingual Given 10/5/21 0007)   cholecalciferol (VITAMIN D3) tablet 1,000 Units (has no administration in time range)   hydroCHLOROthiazide (HYDRODIURIL) tablet 25 mg (has no administration in time range)   metoprolol succinate XL (TOPROL-XL) 24 hr tablet 100 mg (has no administration in time range)   vitamin B-12 (CYANOCOBALAMIN) tablet 1,000 mcg (has no administration in time range)   sodium chloride 0.9 % flush 3 mL (has no administration in time range)   sodium chloride 0.9 % flush 3-10 mL (has no administration in time range)   acetaminophen (TYLENOL) tablet 650 mg (has no administration in time range)     Or   acetaminophen (TYLENOL) 160 MG/5ML solution 650 mg (has no administration in time range)     Or   acetaminophen (TYLENOL) suppository 650 mg (has no administration in time range)   ondansetron (ZOFRAN) injection 4 mg (has no administration in time range)   Morphine sulfate (PF) injection 1 mg (has no administration in time range)     And   naloxone (NARCAN) injection 0.4 mg (has no administration in time range)   aluminum-magnesium hydroxide-simethicone (MAALOX MAX) 400-400-40 MG/5ML suspension 15 mL (has no administration in time range)   melatonin tablet 5 mg (has no administration in time range)   sodium chloride 0.9 % flush 10 mL (has no administration in time range)   sodium chloride 0.9 % flush 10 mL (has no administration in time range)   aspirin tablet 325 mg (325 mg Oral Given 10/4/21 2056)   Morphine sulfate (PF) injection 4 mg (4 mg Intravenous Given 10/4/21 2345)   ondansetron (ZOFRAN) injection 4 mg (4 mg Intravenous Given 10/4/21 2344)     Vital Signs:  Temp:  [98.6 °F (37 °C)] 98.6 °F (37 °C)  Heart Rate:  [50-70] 50  Resp:  [16] 16  BP: (115-195)/() 115/76        10/04/21  1908 10/05/21  0115   Weight: 103 kg (228 lb) 105 kg (232 lb 5.8 oz)     Body mass index is 36.39 kg/m².    Physical Exam:     General Appearance:  Alert and cooperative.     Head:  Atraumatic and normocephalic.   Eyes: Conjunctivae and sclerae normal, no icterus. No pallor.   Ears:  Ears with no abnormalities noted.   Throat: No oral lesions, no thrush, oral mucosa moist.   Neck: Supple, trachea midline, no thyromegaly.   Back:   No kyphoscoliosis present. No tenderness to palpation.   Lungs:   Breath sounds heard bilaterally equally.  No crackles or wheezing. No Pleural rub or bronchial breathing.   Heart:  Normal S1 and S2, no murmur, no gallop, no rub. No JVD.   Abdomen:   Normal bowel sounds, no masses, no organomegaly. Soft, nontender, nondistended, no rebound tenderness.  Protuberant   Extremities: Supple, no edema, no cyanosis, no clubbing.   Pulses: Pulses palpable bilaterally.   Skin: No bleeding or rash.   Neurologic: Alert and oriented x 3. No facial asymmetry. Moves all four limbs. No tremors.      Laboratory data:    I have reviewed the labs done in the emergency room.    Results from last 7 days   Lab Units 10/04/21  2004   SODIUM mmol/L 141   POTASSIUM mmol/L 4.1   CHLORIDE mmol/L 101   CO2 mmol/L 27.9   BUN mg/dL 13   CREATININE mg/dL 1.27   CALCIUM mg/dL 9.6   BILIRUBIN mg/dL 0.6   ALK PHOS U/L 76   ALT (SGPT) U/L 44*   AST (SGOT) U/L 31   GLUCOSE mg/dL 166*     Results from last 7 days   Lab Units 10/05/21  0010 10/04/21  2004   WBC 10*3/mm3 8.73 7.59   HEMOGLOBIN g/dL 15.6 16.5   HEMATOCRIT % 43.6 46.6   PLATELETS 10*3/mm3 254 263     Results from last 7 days   Lab Units 10/05/21  0010   INR  0.99     Results from last 7 days   Lab Units 10/04/21  2153 10/04/21  2004   TROPONIN T ng/mL <0.010 <0.010                           Invalid input(s): USDES,  BLOODU, NITRITITE, BACT, EP    Pain Management Panel    There is no flowsheet data to display.         EKG:    Sinus bradycardia with rate 54    Radiology:    No radiology results for the last 3 days    Assessment:  1.  Chest pain, possibly unstable angina versus gas pains related to food poisoning  2.  Food  poisoning  3.  Hypertension  4.  Obesity  5.  Prediabetes    Plan:  Patient will be admitted to [facility] with telemetry.  Dr. Farias has been consulted, follow-up on recommendations.  We will initiate patient on heparin drip and continue to trend troponin levels.  Will obtain 2D echocardiogram.  Left heart cath versus stress test in the morning with Dr. Farias. Start IV fluid with NS at 100 cc/h.  Maalox as needed for gas. Restarted HCTZ and metoprolol.     Risk Assessment: Moderate  DVT Prophylaxis: Lovenox  Code Status: Full  Diet: NPO    Advance Care Planning   ACP discussion was held with the patient during this visit. Patient does not have an advance directive, information provided.      Katrina Chiu MD  10/05/21  01:29 EDT    Dictated utilizing Dragon dictation.

## 2021-10-05 NOTE — PROGRESS NOTES
[facility]    Name:  Zachery Lomeli   Age:  46 y.o.  Sex:  male  :  1975  MRN:  [id number]   Visit Number:  [id number]  Admission Date:  10/4/2021  Date Of Service:  10/05/21  Primary Care Physician:  Keila Bolivar MD    Patient was seen and examined. Pertinent laboratory and radiology data were reviewed.    Vital signs:    Vital Signs (last 24 hours)       10/04 0700  -  10/05 0659 10/05 0700  -  10/05 1125   Most Recent    Temp (°F) 97.9 -  98.6      98.1     98.1 (36.7)    Heart Rate 50 -  70    53 -  66     66    Resp 16 -  20      16     16    /70 -  195/127    113/74 -  182/102     182/102    SpO2 (%) 96 -  99      98     98          Physical Exam:      General Appearance:  Alert and cooperative.    Head:  Atraumatic and normocephalic.   Eyes: Conjunctivae and sclerae normal, no icterus. No pallor.   Ears:  Ears with no abnormalities noted.   Throat: No oral lesions, no thrush, oral mucosa moist.   Neck: Supple, trachea midline   Back:   No kyphoscoliosis present. No tenderness to palpation.   Lungs:   Breath sounds heard bilaterally equally.  No crackles or wheezing.    Heart:  Normal S1 and S2, no murmur, no gallop, no rub. No JVD.   Abdomen:   Normal bowel sounds, no masses, no organomegaly. Soft, nontender, nondistended, no rebound tenderness.    Extremities: Supple, no edema, no cyanosis, no clubbing.   Pulses: Pulses palpable bilaterally.   Skin: No bleeding or rash.   Neurologic: Alert and oriented x 3. No facial asymmetry. Moves all four limbs. No tremors.     With patient's troponin increasing overnight, Dr. Elkins has elected to take patient for coronary angiography today.  Patient without any chest pain on my exam, states he is afraid to move as it might come back.  Echo today.  Continue Heparin drip.  Further course as recommended by Cardiology.  Continue to monitor closely.      Cortney Johnson, SHERIN  10/05/21  11:25 EDT    Dictated  utilizing Dragon dictation.
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
## 2021-10-05 NOTE — NURSING NOTE
Prior to venous sheath removal, order for the removal of sheath was verified, patient was assessed, necessary materials were gathered and patient was educated regarding procedure .    Vital signs noted and groin site assessed.  Groin site is soft.  No hematoma present.  Pedal pulses noted to be 2+ to 3+ in both feet.  Right foot is slightly cooler than left foot.  No reports of numbness, tingling, or pain in RLE.      Patient was positioned flat to ensure that the insertion site was at or below the level of the heart.    Hands were washed, clean gloves were applied and central line dressing was gently removed.      A new pair of clean gloves were then applied. Insertion site was cleansed with 2% Chlorhexidine swab using a circular motion beginning at the insertion site and moving outward for 30 seconds and allowed to dry.      The sheath was grasped at the insertion site and slowly pulled outward parallel to the skin. Resistance was not met.    After sheath was completely removed, a sterile 4x4 gauze pad was used to apply light pressure until hemostasis achieved.  Manual pressure held ten minutes.  At that time, a sterile occlusive dressing was applied to exit site.     Patient was instructed to keep dressing in place for at least 24 hours and to remain in a flat or reclining position for 30 minutes post-removal.     Sheath was inspected after removal and was intact. Patient tolerated procedure.

## 2021-10-05 NOTE — CASE MANAGEMENT/SOCIAL WORK
Discharge Planning Assessment  [facility]     Patient Name: Zachery Lomeli  MRN: [id number]  Today's Date: 10/5/2021    Admit Date: 10/4/2021    Discharge Needs Assessment     Row Name 10/05/21 1511       Living Environment    Lives With  spouse    Current Living Arrangements  home/apartment/condo    Primary Care Provided by  self    Provides Primary Care For  no one, unable/limited ability to care for self    Quality of Family Relationships  involved       Resource/Environmental Concerns    Transportation Concerns  car, none       Transition Planning    Patient/Family Anticipates Transition to  home with family    Patient/Family Anticipated Services at Transition  none       Discharge Needs Assessment    Readmission Within the Last 30 Days  no previous admission in last 30 days    Equipment Currently Used at Home  none    Provided Post Acute Provider List?  N/A    Provided Post Acute Provider Quality & Resource List?  N/A        Discharge Plan     Row Name 10/05/21 1512       Plan    Plan  Spoke to pt regarding Discharge plans Confirmed address and phone number Lives with his wife independent with ADLS .Does not have any equipment plan is to return home Denies any needs    Row Name 10/05/21 1401       Plan    Plan Comments  pt in cath lab unable to do DCP at this time        Continued Care and Services - Admitted Since 10/4/2021    Coordination has not been started for this encounter.       Expected Discharge Date and Time     Expected Discharge Date Expected Discharge Time    Oct 6, 2021         Demographic Summary     Row Name 10/05/21 1510       General Information    Admission Type  observation    Referral Source  admission list        Functional Status     Row Name 10/05/21 1510       Functional Status    Usual Activity Tolerance  good       Functional Status, IADL    Medications  independent    Meal Preparation  independent    Housekeeping  independent    Laundry  independent    Shopping  independent        Mental Status    General Appearance WDL  WDL        Psychosocial    No documentation.       Abuse/Neglect    No documentation.       Legal     Row Name 10/05/21 5768       Financial/Legal    Finance Comments  DENIES ANY DIFFICULTY WITH AFFORDING MEDICATION        Substance Abuse    No documentation.       Patient Forms    No documentation.           Liane Vasquez RN

## 2021-10-05 NOTE — PLAN OF CARE
Goal Outcome Evaluation:  Plan of Care Reviewed With: patient        Progress: no change  Outcome Summary: Pt had episode of chest pain with elevated BP after ambulating to BR-nitro releaved pain and lowered BP; pt receiving heparin drip-monitor aPTT; cardiology consult in am
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
## 2021-10-05 NOTE — PLAN OF CARE
Goal Outcome Evaluation:         Heart cath today, TR band removed, dressing CDI, right femoral sheath removed by Lety RN (House Supervisor), patient flat in bed x6hrs per orders, dressing CDI, wctm, notify MD for issues or concerns

## 2021-10-05 NOTE — CASE MANAGEMENT/SOCIAL WORK
Continued Stay Note   [facility]     Patient Name: Zachery Lomeli  MRN: [id number]  Today's Date: 10/5/2021    Admit Date: 10/4/2021    Discharge Plan     Row Name 10/05/21 1404       Plan    Plan Comments  pt in cath lab unable to do DCP at this time        Discharge Codes    No documentation.             Liane Vasquez RN

## 2021-10-05 NOTE — ED NOTES
Called Dr. Chiu per Dr. Girard. Call transferred.      Nelly Dueñas  10/04/21 0156    
Called Dr. Palafox per Dr. Girard. Call transferred.       Nelly Dueñas  10/04/21 4724    
Patient will be going to  424. RN, Sandie notified.      Nelly Dueñas  10/05/21 0028    
Pt was walking out after receiving discharge instructions. His cp returned, he was diaphoretic and anxious pt placed in wheelchair and taken back to room. Dr Girard notified of pt returning orders given.      Sandie Dockery, RN  10/05/21 0055    
21-Aug-2021 18:35

## 2021-10-05 NOTE — DISCHARGE SUMMARY
"    [facility]      Name:  Zachery Lomeli   Age:  46 y.o.  Sex:  male  :  1975  MRN:  [id number]   Visit Number:  [id number]    Admission Date:  10/4/2021  Date of Discharge:  10/6/2021  Primary Care Physician:  Keila Bolivar MD    Important issues to note:    1.  Admitted to the hospital with chest pain and found to have rising troponins.    2.  Taken for coronary angiography by Dr. Elkins with 3 stents placed.    3.  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  Stable for discharge home today--new medications including:  Lipitor 80mg daily, Brilinta 90mg twice daily, ASA 81 mg daily, resume regular home medications with decrease in Metoprolol from 100mg to 25mg at discharge.     Discharge Diagnoses:     Active Hospital Problems    Diagnosis  POA   • **Chest pain [R07.9]  Yes   • NSTEMI (non-ST elevated myocardial infarction) (HCC) [I21.4]  Yes   • Anxiety disorder [F41.9]  Yes   • Benign essential hypertension [I10]  Yes      Resolved Hospital Problems   No resolved problems to display.     Presenting Problem:    Chief Complaint   Patient presents with   • Chest Pain      Consults:     Consulting Physician(s)  Chat With All Active Members    Provider Relationship Specialty    Cuong Elkins MD  Consulting Physician Cardiology        Procedures Performed:    Procedure(s):  Coronary angiography  Percutaneous Coronary Intervention    History of presenting illness/Hospital Course:    Patient is a 46 year old who presented to the emergency department 10/4/2021 with " "complaints of chest pain described as sharp, midsternal with radiation down both arms, intermittent and worsened with exertion, relieved by rest.  Patient reported pain had been ongoing for past 2 months but was much worse that evening.  Patient also reported possible food poisoning with multiple episodes of diarrhea prior to arrival.  Troponins were negative x2, and patient was going to be discharged from ED but then had another episode on the way to his car and was returned to the ED for further evaluation becoming very diaphoretic and anxious.  Morphine and Nitroglycerin improved the chest pain.  Dr. Farias was consulted and patient was started on heparin drip and admitted for further evaluation.  Patient with past health history significant for hypertension, prediabetes, and obesity as well being a former smoker who currently vapes.      During admission repeat troponins were found to be rising.  Dr. Elkins consulted and consented patient for coronary angiography.  Patient had 3 stents placed.  Dr. 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  Patient is stable for discharge home today.  No complaints of chest pain, shortness of breath, or any other problems.  Follow up with PCP next week for a recheck.  Follow up with Dr. Elkins in 1-2 weeks for a recheck.  Medication changes at discharge to include--Lipitor 80mg daily, Brilinta 90mg twice daily, ASA 81 mg daily, resume regular home medications with " decrease in Metoprolol from 100mg to 25mg at discharge.  Return to the hospital with any problems.       Vital Signs:    Temp:  [98.1 °F (36.7 °C)-98.9 °F (37.2 °C)] (P) 98.2 °F (36.8 °C)  Heart Rate:  [54-70] (P) 55  Resp:  [14-16] (P) 16  BP: (125-182)/() (P) 130/69    Physical Exam:    General Appearance:  Alert and cooperative, pleasant middle aged male in no acute distress   Head:  Atraumatic and normocephalic.   Eyes: Conjunctivae and sclerae normal, no icterus. No pallor.   Ears:  Ears with no abnormalities noted.   Throat: No oral lesions, no thrush, oral mucosa moist.   Neck: Supple, trachea midline   Back:   No kyphoscoliosis present. No tenderness to palpation.   Lungs:   Breath sounds heard bilaterally equally.  No crackles or wheezing.    Heart:  Normal S1 and S2, no murmur, no gallop, no rub. No JVD.   Abdomen:   Normal bowel sounds, no masses, no organomegaly. Soft, nontender, nondistended, no rebound tenderness.   Extremities: Supple, no edema, no cyanosis, no clubbing.   Pulses: Pulses palpable bilaterally.   Skin: No bleeding or rash.   Neurologic: Alert and oriented x 3. No facial asymmetry. Moves all four limbs.      Pertinent Lab Results:     Results from last 7 days   Lab Units 10/06/21  0620 10/05/21  0530 10/04/21  2004   SODIUM mmol/L 138 138 141   POTASSIUM mmol/L 3.6 4.1 4.1   CHLORIDE mmol/L 103 103 101   CO2 mmol/L 26.2 25.0 27.9   BUN mg/dL 10 15 13   CREATININE mg/dL 0.96 1.07 1.27   CALCIUM mg/dL 9.0 9.2 9.6   BILIRUBIN mg/dL  --   --  0.6   ALK PHOS U/L  --   --  76   ALT (SGPT) U/L  --   --  44*   AST (SGOT) U/L  --   --  31   GLUCOSE mg/dL 116* 126* 166*     Results from last 7 days   Lab Units 10/06/21  0620 10/05/21  0530 10/05/21  0010   WBC 10*3/mm3 8.65 7.35 8.73   HEMOGLOBIN g/dL 14.3 14.8 15.6   HEMATOCRIT % 39.4 41.9 43.6   PLATELETS 10*3/mm3 228 259 254     Results from last 7 days   Lab Units 10/05/21  0010   INR  0.99     Results from last 7 days   Lab Units  10/05/21  0530 10/05/21  0154 10/04/21  2153   TROPONIN T ng/mL 0.092* 0.039* <0.010                           Pertinent Radiology Results:    Imaging Results (All)     Procedure Component Value Units Date/Time    XR Chest 1 View [[id number]] Collected: 10/05/21 0947     Updated: 10/05/21 1248    Narrative:      PROCEDURE: XR CHEST 1 VW-        HISTORY: Chest Pain Triage Protocol     COMPARISON: None.     FINDINGS: The heart is normal in size. The mediastinum is unremarkable.  There are low lung volumes. There is no pneumothorax. There are no acute  osseous abnormalities.       Impression:      No acute cardiopulmonary process.                       Images were reviewed, interpreted, and dictated by Dr. Stephanie Norwood M.D.  Transcribed by Kayla Augustine PA-C.     This report was finalized on 10/5/2021 12:46 PM by Stephanie Norwood M.D..          Echo:    Results for orders placed during the hospital encounter of 10/04/21    Adult Transthoracic Echo Complete w/ Color, Spectral and Contrast if necessary per protocol    Interpretation Summary  · Left ventricular wall thickness is consistent with moderate concentric hypertrophy.  · Left ventricular diastolic function was normal.  · Estimated right ventricular systolic pressure from tricuspid regurgitation is normal (<35 mmHg). Calculated right ventricular systolic pressure from tricuspid regurgitation is 26 mmHg.    Condition on Discharge:      Stable.    Code status during the hospital stay:    Code Status and Medical Interventions:   Ordered at: 10/05/21 0128     Level Of Support Discussed With:    Patient     Code Status:    CPR     Medical Interventions (Level of Support Prior to Arrest):    Full     Discharge Disposition:    Home or Self Care    Discharge Medications:       Discharge Medications      New Medications      Instructions Start Date   aspirin 81 MG chewable tablet   Chew 1 tablet by mouth Daily.   Start Date: October 7, 2021     atorvastatin 80  MG tablet  Commonly known as: LIPITOR   Take 1 tablet by mouth Every Night.   Start Date: October 7, 2021     ticagrelor 90 MG tablet tablet  Commonly known as: BRILINTA   Take 1 tablet by mouth 2 (Two) Times a Day.         Changes to Medications      Instructions Start Date   metoprolol succinate XL 25 MG 24 hr tablet  Commonly known as: TOPROL-XL  What changed:   · medication strength  · how much to take   Take 1 tablet by mouth Daily   Start Date: October 7, 2021        Continue These Medications      Instructions Start Date   hydroCHLOROthiazide 25 MG tablet  Commonly known as: HYDRODIURIL   25 mg, Oral, Daily      vitamin B-12 1000 MCG tablet  Commonly known as: CYANOCOBALAMIN   1,000 mcg, Oral, Daily      Vitamin D-3 25 MCG (1000 UT) capsule   1,000 Units, Oral, Daily           Discharge Diet:     Diet Instructions     Diet: Cardiac      Discharge Diet: Cardiac        Activity at Discharge:     Activity Instructions     Activity as Tolerated      Gradually Increase Activity Until at Pre-Hospitalization Level          Follow-up Appointments:    Follow-up Information     Schedule an appointment as soon as possible for a visit with Keila Bolivar MD.    Specialties: Internal Medicine, Geriatric Medicine  Why: Follow up in 1-2 weeks for a recheck.  Contact information:  [address]  [place] [id number] [phone]             Schedule an appointment as soon as possible for a visit with Brant Farias MD.    Specialties: Cardiology, Interventional Cardiology, Internal Medicine  Why: Please schedule appointment to be seen in clinic in 1-2 weeks prior to discharge.  Contact information:  [address]  [facility] [address] [phone]             Keila Bolivar MD .    Specialties: Internal Medicine, Geriatric Medicine  Contact information:  [address]  [place] [id number] [phone]                 Future Appointments   Date Time Provider Department Center    12/27/2021  2:00 PM Keila Bolivar MD [facility] Barr GIOVANNY Johnson, APRSERENA  10/06/21  09:39 EDT    Time: I spent 25 minutes on this discharge activity which included: face-to-face encounter with the patient, reviewing the data in the system, coordination of the care with the nursing staff as well as consultants, documentation, and entering orders.     Dictated utilizing Dragon dictation.

## 2021-10-06 ENCOUNTER — READMISSION MANAGEMENT (OUTPATIENT)
Dept: CALL CENTER | Facility: HOSPITAL | Age: 46
End: 2021-10-06

## 2021-10-06 VITALS
HEART RATE: 55 BPM | OXYGEN SATURATION: 98 % | TEMPERATURE: 98.2 F | HEIGHT: 67 IN | WEIGHT: 232.37 LBS | BODY MASS INDEX: 36.47 KG/M2 | SYSTOLIC BLOOD PRESSURE: 130 MMHG | DIASTOLIC BLOOD PRESSURE: 69 MMHG | RESPIRATION RATE: 16 BRPM

## 2021-10-06 LAB
ANION GAP SERPL CALCULATED.3IONS-SCNC: 8.8 MMOL/L (ref 5–15)
BUN SERPL-MCNC: 10 MG/DL (ref 6–20)
BUN/CREAT SERPL: 10.4 (ref 7–25)
CALCIUM SPEC-SCNC: 9 MG/DL (ref 8.6–10.5)
CHLORIDE SERPL-SCNC: 103 MMOL/L (ref 98–107)
CHOLEST SERPL-MCNC: 133 MG/DL (ref 0–200)
CO2 SERPL-SCNC: 26.2 MMOL/L (ref 22–29)
CREAT SERPL-MCNC: 0.96 MG/DL (ref 0.76–1.27)
DEPRECATED RDW RBC AUTO: 37.1 FL (ref 37–54)
ERYTHROCYTE [DISTWIDTH] IN BLOOD BY AUTOMATED COUNT: 12.2 % (ref 12.3–15.4)
GFR SERPL CREATININE-BSD FRML MDRD: 84 ML/MIN/1.73
GLUCOSE SERPL-MCNC: 116 MG/DL (ref 65–99)
HBA1C MFR BLD: 6.1 % (ref 4.8–5.6)
HCT VFR BLD AUTO: 39.4 % (ref 37.5–51)
HDLC SERPL-MCNC: 33 MG/DL (ref 40–60)
HGB BLD-MCNC: 14.3 G/DL (ref 13–17.7)
LDLC SERPL CALC-MCNC: 82 MG/DL (ref 0–100)
LDLC/HDLC SERPL: 2.45 {RATIO}
MCH RBC QN AUTO: 30.2 PG (ref 26.6–33)
MCHC RBC AUTO-ENTMCNC: 36.3 G/DL (ref 31.5–35.7)
MCV RBC AUTO: 83.1 FL (ref 79–97)
PLATELET # BLD AUTO: 228 10*3/MM3 (ref 140–450)
PMV BLD AUTO: 9.7 FL (ref 6–12)
POTASSIUM SERPL-SCNC: 3.6 MMOL/L (ref 3.5–5.2)
RBC # BLD AUTO: 4.74 10*6/MM3 (ref 4.14–5.8)
SODIUM SERPL-SCNC: 138 MMOL/L (ref 136–145)
TRIGL SERPL-MCNC: 96 MG/DL (ref 0–150)
VLDLC SERPL-MCNC: 18 MG/DL (ref 5–40)
WBC # BLD AUTO: 8.65 10*3/MM3 (ref 3.4–10.8)

## 2021-10-06 PROCEDURE — 80061 LIPID PANEL: CPT | Performed by: INTERNAL MEDICINE

## 2021-10-06 PROCEDURE — 85027 COMPLETE CBC AUTOMATED: CPT | Performed by: INTERNAL MEDICINE

## 2021-10-06 PROCEDURE — 80048 BASIC METABOLIC PNL TOTAL CA: CPT | Performed by: INTERNAL MEDICINE

## 2021-10-06 PROCEDURE — 99217 PR OBSERVATION CARE DISCHARGE MANAGEMENT: CPT | Performed by: NURSE PRACTITIONER

## 2021-10-06 PROCEDURE — G0378 HOSPITAL OBSERVATION PER HR: HCPCS

## 2021-10-06 PROCEDURE — 83036 HEMOGLOBIN GLYCOSYLATED A1C: CPT | Performed by: INTERNAL MEDICINE

## 2021-10-06 RX ORDER — ATORVASTATIN CALCIUM 80 MG/1
80 TABLET, FILM COATED ORAL NIGHTLY
Qty: 30 TABLET | Refills: 0 | Status: SHIPPED | OUTPATIENT
Start: 2021-10-07 | End: 2021-10-28 | Stop reason: SDUPTHER

## 2021-10-06 RX ORDER — ASPIRIN 81 MG/1
81 TABLET, CHEWABLE ORAL DAILY
Qty: 30 TABLET | Refills: 0 | Status: SHIPPED | OUTPATIENT
Start: 2021-10-07 | End: 2021-11-06

## 2021-10-06 RX ORDER — METOPROLOL SUCCINATE 25 MG/1
25 TABLET, EXTENDED RELEASE ORAL DAILY
Qty: 30 TABLET | Refills: 0 | Status: SHIPPED | OUTPATIENT
Start: 2021-10-07 | End: 2021-10-20

## 2021-10-06 RX ADMIN — Medication 1000 UNITS: at 08:30

## 2021-10-06 RX ADMIN — SODIUM CHLORIDE, PRESERVATIVE FREE 10 ML: 5 INJECTION INTRAVENOUS at 08:34

## 2021-10-06 RX ADMIN — HYDROCHLOROTHIAZIDE 25 MG: 25 TABLET ORAL at 08:29

## 2021-10-06 RX ADMIN — ASPIRIN 81 MG CHEWABLE TABLET 81 MG: 81 TABLET CHEWABLE at 08:30

## 2021-10-06 RX ADMIN — TICAGRELOR 90 MG: 90 TABLET ORAL at 08:30

## 2021-10-06 RX ADMIN — SODIUM CHLORIDE, PRESERVATIVE FREE 3 ML: 5 INJECTION INTRAVENOUS at 08:30

## 2021-10-06 RX ADMIN — METOPROLOL SUCCINATE 25 MG: 25 TABLET, EXTENDED RELEASE ORAL at 08:30

## 2021-10-06 RX ADMIN — CYANOCOBALAMIN TAB 500 MCG 1000 MCG: 500 TAB at 08:29

## 2021-10-06 NOTE — DISCHARGE INSTRUCTIONS
You are being discharged home today.  You will need to follow up with Cardiology in 1-2 weeks as scheduled.  At this appointment they will let you know about the follow up with Dr. Francisco at  that they will arrange.  You are being discharged with several new medications including:    *Brilinta 90mg twice daily  *Aspirin 81mg daily  *Lipitor 80mg daily  *Decrease your Metoprolol from 100mg to 25mg at discharge    Return to the hospital with any further chest pain, shortness of breath, or palpitations.  Follow up with your PCP in 1-2 weeks for a recheck.

## 2021-10-06 NOTE — PLAN OF CARE
Goal Outcome Evaluation:  Plan of Care Reviewed With: patient        Progress: improving  Outcome Summary: VSS; NSR on tele; no acute events overnigt; no bleeding from cath sites; plan for possible dc to home in am

## 2021-10-07 ENCOUNTER — TRANSITIONAL CARE MANAGEMENT TELEPHONE ENCOUNTER (OUTPATIENT)
Dept: CALL CENTER | Facility: HOSPITAL | Age: 46
End: 2021-10-07

## 2021-10-07 NOTE — OUTREACH NOTE
Prep Survey      Responses   [facility] patient discharged from?  [place]   Is LACE score < 7 ?  Yes   Emergency Room discharge w/ pulse ox?  No   Eligibility  [facility]   Date of Admission  10/04/21   Date of Discharge  10/06/21   Discharge Disposition  Home or Self Care   Discharge diagnosis  chest pain, NSTEMI, s/p Cath/PCI   Does the patient have one of the following disease processes/diagnoses(primary or secondary)?  Acute MI (STEMI,NSTEMI)   Does the patient have Home health ordered?  No   Is there a DME ordered?  No   Medication alerts for this patient  meds to beds   Prep survey completed?  Yes          Kaci Alan RN

## 2021-10-07 NOTE — OUTREACH NOTE
Call Center TCM Note      Responses   [facility] patient discharged from?  [place]   Does the patient have one of the following disease processes/diagnoses(primary or secondary)?  Acute MI (STEMI,NSTEMI)   TCM attempt successful?  Yes   Discharge diagnosis  chest pain, NSTEMI, s/p Cath/PCI   Medication alerts for this patient  meds to beds   Meds reviewed with patient/caregiver?  Yes   Is the patient having any side effects they believe may be caused by any medication additions or changes?  No   Does the patient have all prescriptions related to this admission filled (includes statins,anticoagulants,HTN meds,anti-arrhythmia meds)  Yes   Is the patient taking all medications as directed (includes completed medication regime)?  Yes   Does the patient have a primary care provider?   Yes   Does the patient have an appointment with their PCP,cardiologist,or clinic within 7 days of discharge?  Yes   Comments regarding PCP  TCM FWP with PCP Dr Bolivar is 10/11/2021. Pt will be calling to sched fwp with Cardio MD Dr Farias    Has the patient kept scheduled appointments due by today?  N/A   Has home health visited the patient within 72 hours of discharge?  N/A   Psychosocial issues?  No   Did the patient receive a copy of their discharge instructions?  Yes   Nursing interventions  Reviewed instructions with patient   Nursing interventions  Nurse provided patient education   Is the patient/caregiver able to teach back signs and symptoms of when to call for help immediately:  Sudden chest discomfort, Nausea or vomiting, Sudden discomfort in arms, back, neck or jaw, Dizziness or lightheadedness, Irregular or rapid heart rate, Shortness of breath at any time, Sudden sweating or clammy skin   Nursing interventions  Nurse provided patient education   Is the pateint /caregiver able to teach back the importance of cardiac rehab?  Yes   Nursing interventions  Provided education on importance of cardiac rehab   Is the  patient/caregiver able to teach back lifestyle changes to help prevent MIs  Managing diabetes, Reducing stress, Regular exercise as approved by provider, Heart healthy diet, Limiting alcohol intake, Maintaining a healthy weight   Is the patient/caregiver able to teach back ways to prevent a second heart attack:  Take medications, Manage risk factors, Get support (AHA website:supportnetwork.heart.org), Follow up with MD, Participate in Cardiac Rehab   If the patient is a current smoker, are they able to teach back resources for cessation?  Not a smoker [Pt does vape]   Is the patient/caregiver able to teach back the hierarchy of who to call/visit for symptoms/problems? PCP, Specialist, Home health nurse, Urgent Care, ED, 911  Yes   TCM call completed?  Yes   Wrap up additional comments  Pt a little tired, but feeling overall well. No chest pain, SOB, nausea, sweats. All meds in place. Taking it easy today. No questions at this time. TCM FWP with PCP Dr Bolivar is 10/11/2021. Pt will be calling to sched fwp with Cardio MD Dr Jarrett Brody MA    10/7/2021, 14:12 EDT

## 2021-10-07 NOTE — CASE MANAGEMENT/SOCIAL WORK
Case Management Discharge Note           Provided Post Acute Provider List?: N/A  Provided Post Acute Provider Quality & Resource List?: N/A    Selected Continued Care - Discharged on 10/6/2021 Admission date: 10/4/2021 - Discharge disposition: Home or Self Care            Transportation Services  Private: Car    Final Discharge Disposition Code: 01 - home or self-care

## 2021-10-11 ENCOUNTER — OFFICE VISIT (OUTPATIENT)
Dept: INTERNAL MEDICINE | Facility: CLINIC | Age: 46
End: 2021-10-11

## 2021-10-11 VITALS
WEIGHT: 225 LBS | RESPIRATION RATE: 16 BRPM | OXYGEN SATURATION: 98 % | TEMPERATURE: 97.3 F | DIASTOLIC BLOOD PRESSURE: 84 MMHG | HEART RATE: 79 BPM | HEIGHT: 67 IN | BODY MASS INDEX: 35.31 KG/M2 | SYSTOLIC BLOOD PRESSURE: 125 MMHG

## 2021-10-11 DIAGNOSIS — G47.33 OSA (OBSTRUCTIVE SLEEP APNEA): ICD-10-CM

## 2021-10-11 DIAGNOSIS — I10 BENIGN ESSENTIAL HYPERTENSION: Primary | ICD-10-CM

## 2021-10-11 DIAGNOSIS — E78.2 MIXED HYPERLIPIDEMIA: ICD-10-CM

## 2021-10-11 DIAGNOSIS — Z23 NEED FOR INFLUENZA VACCINATION: ICD-10-CM

## 2021-10-11 DIAGNOSIS — I25.10 CORONARY ARTERY DISEASE INVOLVING NATIVE CORONARY ARTERY OF NATIVE HEART WITHOUT ANGINA PECTORIS: ICD-10-CM

## 2021-10-11 DIAGNOSIS — E11.9 CONTROLLED TYPE 2 DIABETES MELLITUS WITHOUT COMPLICATION, WITHOUT LONG-TERM CURRENT USE OF INSULIN (HCC): ICD-10-CM

## 2021-10-11 PROCEDURE — 99214 OFFICE O/P EST MOD 30 MIN: CPT | Performed by: INTERNAL MEDICINE

## 2021-10-11 PROCEDURE — 90471 IMMUNIZATION ADMIN: CPT | Performed by: INTERNAL MEDICINE

## 2021-10-11 PROCEDURE — 90686 IIV4 VACC NO PRSV 0.5 ML IM: CPT | Performed by: INTERNAL MEDICINE

## 2021-10-11 NOTE — PROGRESS NOTES
Transitional Care Follow Up Visit  Subjective     Zachery Lomeli is a 46 y.o. male who presents for a transitional care management visit.    Within 48 business hours after discharge our office contacted him via telephone to coordinate his care and needs.      I reviewed and discussed the details of that call along with the discharge summary, hospital problems, inpatient lab results, inpatient diagnostic studies, and consultation reports with Zachery.     Current outpatient and discharge medications have been reconciled for the patient.  Reviewed by: Keila Bolivar MD      Date of TCM Phone Call 10/6/2021   [facility]   Date of Admission 10/4/2021   Date of Discharge 10/6/2021   Discharge Disposition Home or Self Care     Risk for Readmission (LACE) Score: 3 (10/6/2021  6:00 AM)    Chief Complaint   Patient presents with   • Follow-up     [facility] admit 10/4/2021 chest pain discharged 10/6/2021   • Hypertension   • Hyperlipidemia   • Coronary Artery Disease         History of Present Illness   Course During Hospital Stay:  The patient is also here to follow up on hospitalization at [facility] admitted on 10-4-2021 and discharged on 10-6-2021 for coronary artery disease and PTCA with stent placement, he has a follow-up with cardiology, his  Hospital  records ,  Lab reports  and Radiology reports have been reviewed  Today and medications reconciled and updated .   Patient is here to follow up on the blood pressure  The patient is taking the blood pressure medications as prescribed and has had no side effects. The patient is also here to follow up on the cholesterol and is trying to follow a diet. The patient had lab work done .  The patient also needs flu vaccine .  He is accompanied by his wife today who would like him to be checked for sleep apnea  Hyperlipidemia   Pertinent negatives include no chest pain   Hypertension   Pertinent negatives include no chest pain, palpitations       The  "following portions of the patient's history were reviewed and updated as appropriate: allergies, current medications, past family history, past medical history, past social history, past surgical history and problem list.    Review of Systems   Constitutional: Negative for appetite change, fatigue and fever.   HENT: Negative for congestion, ear discharge, ear pain, sinus pressure and sore throat.    Eyes: Negative for pain and discharge.   Respiratory: Negative for cough, shortness of breath and wheezing.    Cardiovascular: Negative for chest pain, palpitations and leg swelling.   Gastrointestinal: Negative for abdominal pain, constipation, diarrhea, nausea and vomiting.   Endocrine: Negative for cold intolerance and heat intolerance.   Genitourinary: Negative for dysuria and flank pain.   Musculoskeletal: Negative for arthralgias and joint swelling.   Skin: Negative for pallor and rash.   Allergic/Immunologic: Negative for environmental allergies and food allergies.   Neurological: Negative for dizziness, weakness and numbness.   Hematological: Negative for adenopathy. Does not bruise/bleed easily.   Psychiatric/Behavioral: Negative for behavioral problems and dysphoric mood. The patient is not nervous/anxious.      Vitals:    10/11/21 0903 10/11/21 0932   BP: (!) 162/101 125/84   Pulse: 85 79   Resp: 16    Temp: 97.3 °F (36.3 °C)    SpO2: 98%    Weight: 102 kg (225 lb)    Height: 170.2 cm (67.01\")        Objective   Physical Exam  Vitals and nursing note reviewed.   Constitutional:       General: He is not in acute distress.     Appearance: Normal appearance. He is not diaphoretic.   HENT:      Head: Normocephalic and atraumatic.      Right Ear: External ear normal.      Left Ear: External ear normal.      Nose: Nose normal.   Eyes:      Extraocular Movements: Extraocular movements intact.      Conjunctiva/sclera: Conjunctivae normal.   Neck:      Trachea: Trachea normal.   Cardiovascular:      Rate and Rhythm: " Normal rate and regular rhythm.      Heart sounds: Normal heart sounds.   Pulmonary:      Effort: Pulmonary effort is normal. No respiratory distress.   Abdominal:      General: Abdomen is flat.   Musculoskeletal:      Cervical back: Neck supple.      Comments: Moves all limbs   Skin:     General: Skin is warm and dry.      Findings: No erythema.   Neurological:      Mental Status: He is alert and oriented to person, place, and time.      Comments: No gross motor or sensory deficits     ED to Hosp-Admission (Discharged) with Katrina Chiu MD; Dean Girard MD (10/04/2021)      Assessment/Plan   Diagnoses and all orders for this visit:    1. Benign essential hypertension (Primary)    2. Mixed hyperlipidemia  -     CBC & Differential  -     Comprehensive Metabolic Panel  -     Lipid Panel    3. Controlled type 2 diabetes mellitus without complication, without long-term current use of insulin (HCC)  -     Hemoglobin A1c    4. Coronary artery disease involving native coronary artery of native heart without angina pectoris    5. GORDO (obstructive sleep apnea)  -     Ambulatory Referral to Pulmonology    6. Need for influenza vaccination  -     FluLaval/Fluarix >6 Months ([id number])    Plan:  1.  Benign essential hypertension: Will continue current medication, low-sodium diet advised, Counseled to regularly check BP at home with goal averaging <130/80.   2.mixed hyperlipidemia:   reviewed   fasting CMP and lipid panel.  Diet and exercise counseled,  Will continue current medications  3. Diabetes  Mellitus  :   Reviewed   fasting CMP  and hba1c  6.1  , diet and exercise counseled , Will continue current medications  4.  Coronary artery disease: To continue current medication, PTCA with stent placement, keep cardiology appointment  5.Gordo:  Refer to pulmonary   6. Need for flu vaccine : given today

## 2021-10-13 ENCOUNTER — READMISSION MANAGEMENT (OUTPATIENT)
Dept: CALL CENTER | Facility: HOSPITAL | Age: 46
End: 2021-10-13

## 2021-10-13 NOTE — OUTREACH NOTE
AMI Week 2 Survey      Responses   [facility] patient discharged from? [place]   Does the patient have one of the following disease processes/diagnoses(primary or secondary)? Acute MI (STEMI,NSTEMI)   Week 2 attempt successful? Yes   Call start time 1639   Call end time 1648   Discharge diagnosis chest pain, NSTEMI, s/p Cath/PCI   Is patient permission given to speak with other caregiver? Yes   List who call center can speak with Darcy Lomeli, spouse   Person spoke with today (if not patient) and relationship Darcy, spouse   Meds reviewed with patient/caregiver? Yes   Does the patient have all prescriptions related to this admission filled (includes statins,anticoagulants,HTN meds,anti-arrhythmia meds) Yes   Is the patient taking all medications as directed (includes completed medication regime)? Yes   Medication comments Wife reports that PCP has given instruction may increase the metoprolol to 50mg as needed for high b/p.    Does the patient have a primary care provider?  Yes   Does the patient have an appointment with their PCP,cardiologist,or clinic within 7 days of discharge? Yes   Comments regarding PCP PCP Dr Bolivar. Patient has seen since discharge.    Has the patient kept scheduled appointments due by today? Yes   Comments Cardiology appt next week.    Has home health visited the patient within 72 hours of discharge? N/A   Psychosocial issues? No   Did the patient receive a copy of their discharge instructions? Yes   Nursing interventions Reviewed instructions with patient   What is the patient's perception of their health status since discharge? Improving   Nursing interventions Nurse provided patient education   Is the patient/caregiver able to teach back signs and symptoms of when to call for help immediately: Irregular or rapid heart rate,  Dizziness or lightheadedness,  Nausea or vomiting,  Sudden sweating or clammy skin,  Shortness of breath at any time,  Sudden discomfort in arms, back, neck or  jaw,  Sudden chest discomfort   Nursing interventions Nurse provided patient education   Is the pateint /caregiver able to teach back the importance of cardiac rehab? Yes   Is the patient/caregiver able to teach back lifestyle changes to help prevent MIs Heart healthy diet,  Maintaining a healthy weight,  Regular exercise as approved by provider,  Reducing stress   Is the patient/caregiver able to teach back ways to prevent a second heart attack: Take medications,  Follow up with MD,  Participate in Cardiac Rehab,  Manage risk factors   If the patient is a current smoker, are they able to teach back resources for cessation? Not a smoker   Is the patient/caregiver able to teach back the hierarchy of who to call/visit for symptoms/problems? PCP, Specialist, Home health nurse, Urgent Care, ED, 911 Yes   Week 2 call completed? Yes          Sandra Sandhu RN

## 2021-10-14 ENCOUNTER — OFFICE VISIT (OUTPATIENT)
Dept: PULMONOLOGY | Facility: CLINIC | Age: 46
End: 2021-10-14

## 2021-10-14 ENCOUNTER — TELEPHONE (OUTPATIENT)
Dept: CARDIOLOGY | Facility: CLINIC | Age: 46
End: 2021-10-14

## 2021-10-14 VITALS
OXYGEN SATURATION: 97 % | BODY MASS INDEX: 35.31 KG/M2 | DIASTOLIC BLOOD PRESSURE: 78 MMHG | WEIGHT: 225 LBS | RESPIRATION RATE: 18 BRPM | SYSTOLIC BLOOD PRESSURE: 128 MMHG | HEIGHT: 67 IN | HEART RATE: 78 BPM

## 2021-10-14 DIAGNOSIS — G47.33 OBSTRUCTIVE SLEEP APNEA: Primary | ICD-10-CM

## 2021-10-14 DIAGNOSIS — E66.9 OBESITY (BMI 30-39.9): ICD-10-CM

## 2021-10-14 DIAGNOSIS — G47.19 EXCESSIVE DAYTIME SLEEPINESS: ICD-10-CM

## 2021-10-14 DIAGNOSIS — R06.83 SNORING: ICD-10-CM

## 2021-10-14 PROCEDURE — 99204 OFFICE O/P NEW MOD 45 MIN: CPT | Performed by: INTERNAL MEDICINE

## 2021-10-14 NOTE — PROGRESS NOTES
CONSULT NOTE    Requested by:   Keila Bolivar MD D'Costa, Gina, MD      Chief Complaint   Patient presents with   • Consult   • Sleeping Problem       Subjective:  Zachery Lomeli is a 46 y.o. male.     History of Present Illness   Patient came in today for evaluation of possible sleep apnea. Patient endorses loud snoring. he also says that he has been tired and has fatigue during the day, for the past few years. The patient's family notes that he has occasional pauses in the breathing & he occasionally wakes up gasping for breath.     The patient says that he rarely gets restful night sleep and his quality has diminished considerably. he does have a tendency to feel sleepy while watching TV and reading a book.      The patient has been told that he occasionally talks in his sleep. he also says that he rarely dream.    he is not complaining of occasional headaches. There is known family history of sleep apnea, in his cousins.      his Epsworth Sleepiness score was 18 /24.     Patient suffers from hypertension & CAD.       The following portions of the patient's history were reviewed and updated as appropriate: allergies, current medications, past family history, past medical history, past social history and past surgical history.    Review of Systems   Constitutional: Negative for chills, fatigue and fever.   HENT: Negative for sinus pressure, sneezing and sore throat.    Respiratory: Negative for cough, chest tightness, shortness of breath and wheezing.    Cardiovascular: Negative for palpitations and leg swelling.   Psychiatric/Behavioral: Positive for sleep disturbance.   All other systems reviewed and are negative.      Past Medical History:   Diagnosis Date   • At risk for sleep apnea     Spouse reported patiet was supposed to be checked for sleep apnea but did not want to have testing done.    • Colon polyp 06/17/2019   • Diarrhea     Spouse reported frequent episodes   • GERD (gastroesophageal reflux disease)  "   • History of bronchitis    • History of pneumonia    • Hypertension    • Kidney stones     Spouse reported no surgery was required   • Low back pain    • Rectal bleeding        Social History     Tobacco Use   • Smoking status: Former Smoker     Packs/day: 0.50     Years: 22.00     Pack years: 11.00     Types: Cigarettes, Electronic Cigarette     Quit date: 2018     Years since quitting: 3.7   • Smokeless tobacco: Former User     Types: Snuff     Quit date: 2016   • Tobacco comment: Spouse reported patient has used vapor since smoking cessation in 2018   Substance Use Topics   • Alcohol use: No         Objective:  Visit Vitals  /78   Pulse 78   Resp 18   Ht 170.2 cm (67.01\")   Wt 102 kg (225 lb)   SpO2 97%   BMI 35.23 kg/m²       Physical Exam  Vitals reviewed.   Constitutional:       Appearance: He is well-developed.   HENT:      Head: Atraumatic.      Mouth/Throat:      Comments: Oropharynx was crowded.  Eyes:      Pupils: Pupils are equal, round, and reactive to light.   Neck:      Thyroid: No thyromegaly.      Vascular: No JVD.      Trachea: No tracheal deviation.      Comments: Increased adipose tissue.   Cardiovascular:      Rate and Rhythm: Normal rate and regular rhythm.   Pulmonary:      Effort: Pulmonary effort is normal. No respiratory distress.      Breath sounds: Normal breath sounds. No wheezing.   Musculoskeletal:         General: Normal range of motion.      Comments: Gait was normal.   Skin:     General: Skin is warm and dry.   Neurological:      Mental Status: He is alert and oriented to person, place, and time.   Psychiatric:         Behavior: Behavior normal.         Assessment/Plan:  Diagnoses and all orders for this visit:    1. Obstructive sleep apnea (Primary)  -     Home Sleep Study; Future    2. Snoring  -     Home Sleep Study; Future    3. Excessive daytime sleepiness  -     Home Sleep Study; Future    4. Obesity (BMI 30-39.9)  -     Home Sleep Study; Future        Return in about " 4 months (around 2/14/2022) for SleepONLY/Kassie, ....Also 12 mths w/ Dr. Verdugo.    DISCUSSION(if any):  Laboratory workup was also reviewed which showed   Lab Results   Component Value Date    CO2 26.2 10/06/2021     Laboratory workup also showed   Lab Results   Component Value Date    HGB 14.3 10/06/2021    HGB 14.8 10/05/2021    HGB 15.6 10/05/2021     Referring physicians office note was also reviewed that did mention daytime sleepiness, fatigue & possible sleep apnea.    ===========================  ===========================    Sleep questionnaire was reviewed with the patient    The pathophysiology of sleep apnea was discussed, with the patient.     We will encourage him to schedule the sleep study soon.     The patient was made aware of the limitation of the home sleep study, whereby it may underestimate the true AHI and also carries a low sensitivity.  I have informed him that even if the home sleep study is negative, we may suggest an in lab sleep study to completely and definitively rule out/in sleep apnea.  The patient has understood.  This was communicated to the patient, in case home study is to be requested.    The patient is agreeable to try CPAP/BiPAP, if needed.     Patient was educated on good sleep hygiene measures and voiced understanding of the same.     Patient was given reading material regarding sleep apnea    Patient was counseled regarding weight loss.       Dictated utilizing Dragon dictation.    This document was electronically signed by Seng Verdugo MD on 10/14/21 at 14:09 EDT

## 2021-10-14 NOTE — TELEPHONE ENCOUNTER
This patient is Dr. Elkins's patient.    Patient and wife have inquired when he can return to work. He was supposed to see Ruthann this week. Patient's next appointment is 10/20/2021.    Please review and advise.

## 2021-10-20 ENCOUNTER — READMISSION MANAGEMENT (OUTPATIENT)
Dept: CALL CENTER | Facility: HOSPITAL | Age: 46
End: 2021-10-20

## 2021-10-20 ENCOUNTER — OFFICE VISIT (OUTPATIENT)
Dept: CARDIOLOGY | Facility: CLINIC | Age: 46
End: 2021-10-20

## 2021-10-20 VITALS
BODY MASS INDEX: 35.31 KG/M2 | RESPIRATION RATE: 18 BRPM | SYSTOLIC BLOOD PRESSURE: 132 MMHG | HEIGHT: 67 IN | OXYGEN SATURATION: 99 % | DIASTOLIC BLOOD PRESSURE: 82 MMHG | HEART RATE: 94 BPM | WEIGHT: 225 LBS

## 2021-10-20 DIAGNOSIS — I10 ESSENTIAL HYPERTENSION: ICD-10-CM

## 2021-10-20 DIAGNOSIS — I21.4 NSTEMI, INITIAL EPISODE OF CARE (HCC): ICD-10-CM

## 2021-10-20 DIAGNOSIS — E78.2 MULTIPLE-TYPE HYPERLIPIDEMIA: ICD-10-CM

## 2021-10-20 DIAGNOSIS — Z95.5 STATUS POST PRIMARY ANGIOPLASTY WITH CORONARY STENT: Primary | ICD-10-CM

## 2021-10-20 DIAGNOSIS — I21.4 NSTEMI (NON-ST ELEVATED MYOCARDIAL INFARCTION) (HCC): ICD-10-CM

## 2021-10-20 DIAGNOSIS — I25.10 CORONARY ARTERY DISEASE INVOLVING NATIVE CORONARY ARTERY OF NATIVE HEART WITHOUT ANGINA PECTORIS: Primary | ICD-10-CM

## 2021-10-20 PROCEDURE — 99213 OFFICE O/P EST LOW 20 MIN: CPT | Performed by: NURSE PRACTITIONER

## 2021-10-20 RX ORDER — METOPROLOL SUCCINATE 50 MG/1
50 TABLET, EXTENDED RELEASE ORAL DAILY
Qty: 90 TABLET | Refills: 3 | Status: SHIPPED | OUTPATIENT
Start: 2021-10-20 | End: 2022-10-11 | Stop reason: SDUPTHER

## 2021-10-20 NOTE — PROGRESS NOTES
"             [facility] Office Follow Up Note    Zachery Lomeli  [id number]  10/20/2021    Primary Care Provider: Keila Bolivar MD   Referring Provider: No ref. provider found    Chief Complaint: Hospital follow-up    History of Present Illness:   Mr. Zachery Lomeli is a 46 y.o. male who presents to the Cardiology Clinic for follow-up.  The patient has a past medical history of coronary artery disease, hypertension, anxiety, obstructive sleep apnea, and remote tobacco abuse.  Approximately 2 weeks ago, he presented to the ED with chest pain.  His third troponin was minimally elevated and continue to trend upward.  He was subsequently taken to the Cath Lab and received 3 drug-eluting stents to his left anterior descending artery.  He was started on dual antiplatelet therapy and recommended to start cardiac rehab.  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  He presents today for regular follow-up.  The patient denies any recurrent exertional symptoms including angina or dyspnea.  He denies palpitations, dizziness.  He denies orthopnea and lower extremity edema.  He continues to take aspirin and Brilinta without perceived side effects including significant bruising or bleeding.  He does note that his blood pressure has been running in the 130s over high 80s.  As a result, he sometimes takes 2 metoprolol a day.  He has not yet started cardiac rehab, but is interested in returning to work tomorrow.  He works at a [employer] doing 8-hour shifts and denies heavy lifting associated with this job.  In fact, he reports that they have been slow lately and feels comfortable going back to work tomorrow.  He has asked for a work note reflecting this.  He offers no other complaints or concerns at this time.    Past Cardiac Testin. Last Coronary Angio: " 10/5/2021, NSTEMI   1. 3.5 x 22 mm Xience to mid-LAD   2. 3.5 x 18 mm Xience to prox-LAD (overlapping)   3. 2.5 x 38 mm Xience to distal-LAD  2. Prior Stress Testing: None  3. Last Echo: 10/5/2021   1. Left ventricular wall thickness is consistent with moderate concentric hypertrophy.   2. Left ventricular diastolic function was normal.   3. Estimated right ventricular systolic pressure from tricuspid regurgitation is normal (<35 mmHg). Calculated right ventricular systolic pressure from tricuspid regurgitation is 26 mmHg.   4. Prior Holter Monitor: None    Review of Systems:   Review of Systems   Constitutional: Negative for activity change, chills, diaphoresis, fatigue, fever and unexpected weight gain.   Eyes: Negative for blurred vision and visual disturbance.   Respiratory: Negative for apnea, cough, chest tightness, shortness of breath and wheezing.    Cardiovascular: Negative for chest pain, palpitations and leg swelling.   Gastrointestinal: Negative for abdominal distention, blood in stool, GERD and indigestion.   Endocrine: Negative for cold intolerance and heat intolerance.   Genitourinary: Negative for hematuria.   Musculoskeletal: Negative for gait problem, joint swelling and myalgias.   Skin: Negative for color change, pallor and bruise.   Neurological: Negative for dizziness, seizures, syncope, weakness, light-headedness, numbness, headache and confusion.   Hematological: Does not bruise/bleed easily.   Psychiatric/Behavioral: Negative for behavioral problems, sleep disturbance, suicidal ideas and depressed mood.     I have reviewed and confirmed the accuracy of the ROS as documented by the MA/LPN/RN SHERIN Draper    I have reviewed and/or updated the patient's past medical, past surgical, family, social history, problem list and allergies as appropriate.     Medications:     Current Outpatient Medications:   •  aspirin 81 MG chewable tablet, Chew 1 tablet by mouth Daily., Disp: 30 tablet,  "Rfl: 0  •  atorvastatin (LIPITOR) 80 MG tablet, Take 1 tablet by mouth Every Night., Disp: 30 tablet, Rfl: 0  •  Cholecalciferol (VITAMIN D-3) 1000 UNITS capsule, Take 1,000 Units by mouth Daily., Disp: , Rfl:   •  hydroCHLOROthiazide (HYDRODIURIL) 25 MG tablet, Take 1 tablet by mouth Daily., Disp: 90 tablet, Rfl: 2  •  ticagrelor (BRILINTA) 90 MG tablet tablet, Take 1 tablet by mouth 2 (Two) Times a Day., Disp: 60 tablet, Rfl: 0  •  vitamin B-12 (CYANOCOBALAMIN) 1000 MCG tablet, Take 1,000 mcg by mouth Daily., Disp: , Rfl:   •  metoprolol succinate XL (TOPROL-XL) 50 MG 24 hr tablet, Take 1 tablet by mouth Daily., Disp: 90 tablet, Rfl: 3    Physical Exam:  Vital Signs:   Vitals:    10/20/21 1415   BP: 132/82   BP Location: Right arm   Patient Position: Sitting   Cuff Size: Large Adult   Pulse: 94   Resp: 18   SpO2: 99%   Weight: 102 kg (225 lb)   Height: 170.2 cm (67.01\")     Body mass index is 35.23 kg/m².    Physical Exam  Vitals and nursing note reviewed.   Constitutional:       General: He is not in acute distress.     Appearance: Normal appearance. He is well-developed.      Comments: BMI 35.2 kg/m²   HENT:      Head: Normocephalic and atraumatic.   Eyes:      General: No scleral icterus.     Extraocular Movements: Extraocular movements intact.   Neck:      Trachea: Trachea normal.   Cardiovascular:      Rate and Rhythm: Normal rate and regular rhythm.      Pulses: Normal pulses.      Heart sounds: Normal heart sounds. No murmur heard.  No friction rub. No gallop.    Pulmonary:      Effort: Pulmonary effort is normal.      Breath sounds: Normal breath sounds.   Abdominal:      Palpations: Abdomen is soft.      Tenderness: There is no abdominal tenderness.   Musculoskeletal:         General: Normal range of motion.      Cervical back: Neck supple.      Right lower leg: No edema.      Left lower leg: No edema.   Skin:     General: Skin is warm and dry.      Findings: No bruising, lesion or rash.   Neurological: "      Mental Status: He is alert and oriented to person, place, and time.      Motor: No weakness.      Gait: Gait normal.   Psychiatric:         Mood and Affect: Mood normal.         Behavior: Behavior normal. Behavior is cooperative.         Thought Content: Thought content does not include suicidal ideation.         Results Review:   I reviewed the patient's new clinical results.      Assessment / Plan:     1. Coronary artery disease involving native coronary artery of native heart without angina pectoris (Primary)  --NSTEMI, Status post 3 DONAL to LAD in 10/21  --Stable and angina free  --Continue dual antiplatelet therapy  --Continue high intensity statin  --Continue beta-blocker therapy  --Referral initiated for cardiac rehab services  --Follow-up with Dr. Elkins in 3 months or sooner if needed    2. Essential hypertension  --Less than ideal  --Uptitrate metoprolol succinate to 50 mg daily  --Patient encouraged to keep a twice a day BP log with a goal of <120/80    3. Multiple-type hyperlipidemia  --10/21 LDL 87 mg/dL  --Continue high-intensity statin  --Encouraged low-fat diet  --LDL goal< 70 mg/dL to be followed by PCP      Preventative Cardiology:   Tobacco Cessation: N/A   Advance Care Planning: ACP discussion was declined by the patient. Patient does not have an advance directive, declines further assistance.     Follow Up:   Return in about 3 months (around 1/20/2022) for Follow-up with Dr. Elkins.      Thank you for allowing me to participate in the care of your patient. Please to not hesitate to contact me with additional questions or concerns.     SHERIN Fraser

## 2021-10-20 NOTE — OUTREACH NOTE
AMI Week 3 Survey      Responses   [facility] patient discharged from? [facility]   Does the patient have one of the following disease processes/diagnoses(primary or secondary)? Acute MI (STEMI,NSTEMI)   Week 3 attempt successful? Yes   Call start time 1255   Call end time 1305   Discharge diagnosis chest pain, NSTEMI, s/p Cath/PCI   Meds reviewed with patient/caregiver? Yes   Is the patient taking all medications as directed (includes completed medication regime)? Yes   Has the patient kept scheduled appointments due by today? Yes   Comments On the way to see cardiologist today 10/20/2021   What is the patient's perception of their health status since discharge? Improving   Nursing interventions Nurse provided patient education   Is the patient/caregiver able to teach back signs and symptoms of when to call for help immediately: Sudden chest discomfort,  Dizziness or lightheadedness,  Shortness of breath at any time,  Sudden discomfort in arms, back, neck or jaw,  Irregular or rapid heart rate,  Nausea or vomiting,  Sudden sweating or clammy skin   Nursing interventions Nurse provided patient education   Nursing interventions Provided education on importance of cardiac rehab,  Referral made to cardiac rehab   Is the patient/caregiver able to teach back lifestyle changes to help prevent MIs Heart healthy diet,  Regular exercise as approved by provider,  Maintaining a healthy weight,  Reducing stress,  Managing diabetes,  Quit smoking   Is the patient/caregiver able to teach back ways to prevent a second heart attack: Take medications,  Follow up with MD   If the patient is a current smoker, are they able to teach back resources for cessation? Not a smoker   Additional teach back comments B/P has been a little high, will discuss at cardiology appt today, he is enroute to appt.    Week 3 call completed? Yes          Riya Simon RN

## 2021-10-25 ENCOUNTER — TELEPHONE (OUTPATIENT)
Dept: CARDIOLOGY | Facility: CLINIC | Age: 46
End: 2021-10-25

## 2021-10-25 DIAGNOSIS — I25.10 CORONARY ARTERY DISEASE INVOLVING NATIVE CORONARY ARTERY OF NATIVE HEART WITHOUT ANGINA PECTORIS: Primary | ICD-10-CM

## 2021-10-25 NOTE — TELEPHONE ENCOUNTER
"Hi Dr. 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    I saw this patient in the office for NSTEMI follow-up last week and he and his wife wanted to know about this referral.  Who would you like to see this patient?  Juan? Brigette?  Just let me know and I will put the referral in.  Thanks!      "

## 2021-10-28 ENCOUNTER — OFFICE VISIT (OUTPATIENT)
Dept: INTERNAL MEDICINE | Facility: CLINIC | Age: 46
End: 2021-10-28

## 2021-10-28 VITALS
HEART RATE: 93 BPM | DIASTOLIC BLOOD PRESSURE: 89 MMHG | OXYGEN SATURATION: 98 % | RESPIRATION RATE: 16 BRPM | SYSTOLIC BLOOD PRESSURE: 136 MMHG | HEIGHT: 67 IN | WEIGHT: 223 LBS | TEMPERATURE: 98.2 F | BODY MASS INDEX: 35 KG/M2

## 2021-10-28 DIAGNOSIS — I10 BENIGN ESSENTIAL HYPERTENSION: Primary | ICD-10-CM

## 2021-10-28 DIAGNOSIS — E78.2 MIXED HYPERLIPIDEMIA: ICD-10-CM

## 2021-10-28 DIAGNOSIS — I25.10 CORONARY ARTERY DISEASE INVOLVING NATIVE CORONARY ARTERY OF NATIVE HEART WITHOUT ANGINA PECTORIS: ICD-10-CM

## 2021-10-28 DIAGNOSIS — I25.10 CORONARY ARTERY DISEASE INVOLVING NATIVE CORONARY ARTERY OF NATIVE HEART WITHOUT ANGINA PECTORIS: Primary | ICD-10-CM

## 2021-10-28 PROCEDURE — 99214 OFFICE O/P EST MOD 30 MIN: CPT | Performed by: INTERNAL MEDICINE

## 2021-10-28 RX ORDER — ATORVASTATIN CALCIUM 80 MG/1
80 TABLET, FILM COATED ORAL NIGHTLY
Qty: 90 TABLET | Refills: 3 | Status: SHIPPED | OUTPATIENT
Start: 2021-10-28 | End: 2022-10-11 | Stop reason: SDUPTHER

## 2021-10-28 RX ORDER — LOSARTAN POTASSIUM AND HYDROCHLOROTHIAZIDE 12.5; 5 MG/1; MG/1
1 TABLET ORAL DAILY
Qty: 30 TABLET | Refills: 5 | Status: SHIPPED | OUTPATIENT
Start: 2021-10-28 | End: 2022-04-27 | Stop reason: SDUPTHER

## 2021-10-28 NOTE — TELEPHONE ENCOUNTER
Patient called office requesting a refill on his Brilinta 90 mg. Last office visit 10/20/2021. Next office visit 1/17/2022

## 2021-10-28 NOTE — TELEPHONE ENCOUNTER
Patient has called again in regards to his referral. Wanting to know if you were still wanting to refer him to Dr. Martinez?

## 2021-10-28 NOTE — PROGRESS NOTES
"Chief Complaint  Hypertension, Coronary Artery Disease, and Hyperlipidemia    Subjective          Zachery Lomeli presents to [facility]  History of Present Illness  Patient is here to follow-up on his blood pressure, which is noted to be elevated, patient is also to follow-up on cholesterol, patient is taking medications as prescribed, he was seen by cardiology for CAD and will be referred for cardiac rehab which she is encouraged to go to and also [facility]    Objective   Vital Signs:   /89   Pulse 93   Temp 98.2 °F (36.8 °C)   Resp 16   Ht 170.2 cm (67.01\")   Wt 101 kg (223 lb)   SpO2 98%   BMI 34.92 kg/m²     Physical Exam  Vitals and nursing note reviewed.   Constitutional:       General: He is not in acute distress.     Appearance: Normal appearance. He is not diaphoretic.   HENT:      Head: Normocephalic and atraumatic.      Right Ear: External ear normal.      Left Ear: External ear normal.      Nose: Nose normal.   Eyes:      Extraocular Movements: Extraocular movements intact.      Conjunctiva/sclera: Conjunctivae normal.   Neck:      Trachea: Trachea normal.   Cardiovascular:      Rate and Rhythm: Normal rate and regular rhythm.      Heart sounds: Normal heart sounds.   Pulmonary:      Effort: Pulmonary effort is normal. No respiratory distress.   Abdominal:      General: Abdomen is flat.   Musculoskeletal:      Cervical back: Neck supple.      Comments: Moves all limbs   Skin:     General: Skin is warm and dry.      Findings: No erythema.   Neurological:      Mental Status: He is alert and oriented to person, place, and time.      Comments: No gross motor or sensory deficits        Result Review :     Common labs    Common Labsle 10/4/21 10/4/21 10/4/21 10/5/21 10/5/21 10/5/21 10/6/21 10/6/21 10/6/21 10/6/21    2004 2004 2153 0010 0530 0530 0620 0620 0620 0620   Glucose  166 (A)    126 (A)   116 (A)    BUN  13    15   10    Creatinine  1.27    1.07   0.96    eGFR Non "  Am  61    74   84    Sodium  141    138   138    Potassium  4.1    4.1   3.6    Chloride  101    103   103    Calcium  9.6    9.2   9.0    Albumin  5.00           Total Bilirubin  0.6           Alkaline Phosphatase  76           AST (SGOT)  31           ALT (SGPT)  44 (A)           WBC 7.59   8.73 7.35  8.65      Hemoglobin 16.5   15.6 14.8  14.3      Hematocrit 46.6   43.6 41.9  39.4      Platelets 263   254 259  228      Total Cholesterol   146       133   Triglycerides   129       96   HDL Cholesterol   36 (A)       33 (A)   LDL Cholesterol    87       82   Hemoglobin A1C        6.10 (A)     (A) Abnormal value                      Assessment and Plan    Diagnoses and all orders for this visit:    1. Benign essential hypertension (Primary)  -     losartan-hydrochlorothiazide (Hyzaar) 50-12.5 MG per tablet; Take 1 tablet by mouth Daily.  Dispense: 30 tablet; Refill: 5    2. Mixed hyperlipidemia  -     atorvastatin (LIPITOR) 80 MG tablet; Take 1 tablet by mouth Every Night.  Dispense: 90 tablet; Refill: 3    3. Coronary artery disease involving native coronary artery of native heart without angina pectoris    Plan:  1.  Benign essential hypertension: Will continue current medication, low-sodium diet advised, Counseled to regularly check BP at home with goal averaging <130/80.   2.mixed hyperlipidemia: Reviewed fasting CMP and lipid panel.  Diet and exercise counseled,  Will continue current medications  3. CAD : Patient advised to start cardiac rehab and to continue his current medications, per cardiology  I spent 30 minutes caring for Zachery on this date of service. This time includes time spent by me in the following activities:preparing for the visit, reviewing tests, performing a medically appropriate examination and/or evaluation , counseling and educating the patient/family/caregiver, ordering medications, tests, or procedures and documenting information in the medical record  Follow Up   Patient was  given instructions and counseling regarding his condition or for health maintenance advice. Please see specific information pulled into the AVS if appropriate.

## 2021-11-02 ENCOUNTER — APPOINTMENT (OUTPATIENT)
Dept: CARDIAC REHAB | Facility: HOSPITAL | Age: 46
End: 2021-11-02

## 2021-11-09 ENCOUNTER — TREATMENT (OUTPATIENT)
Dept: CARDIAC REHAB | Facility: HOSPITAL | Age: 46
End: 2021-11-09

## 2021-11-09 DIAGNOSIS — I21.4 NSTEMI (NON-ST ELEVATION MYOCARDIAL INFARCTION) (HCC): ICD-10-CM

## 2021-11-09 DIAGNOSIS — Z95.5 STATUS POST PRIMARY ANGIOPLASTY WITH CORONARY STENT: Primary | ICD-10-CM

## 2021-11-09 PROCEDURE — 93798 PHYS/QHP OP CAR RHAB W/ECG: CPT

## 2021-11-09 RX ORDER — ASPIRIN 81 MG/1
81 TABLET ORAL DAILY
COMMUNITY
End: 2022-03-17

## 2021-11-09 NOTE — PROGRESS NOTES
Pt was seen today in CR for a Phase 2 visit.  Vital signs and session notes recorded in [employer] and will be scanned into Epic by HIM.

## 2021-11-15 ENCOUNTER — TREATMENT (OUTPATIENT)
Dept: CARDIAC REHAB | Facility: HOSPITAL | Age: 46
End: 2021-11-15

## 2021-11-15 DIAGNOSIS — Z95.5 STATUS POST PRIMARY ANGIOPLASTY WITH CORONARY STENT: Primary | ICD-10-CM

## 2021-11-15 DIAGNOSIS — I21.4 NSTEMI (NON-ST ELEVATION MYOCARDIAL INFARCTION) (HCC): ICD-10-CM

## 2021-11-15 PROCEDURE — 93798 PHYS/QHP OP CAR RHAB W/ECG: CPT

## 2021-11-15 NOTE — PROGRESS NOTES
Pt was seen today in CR for a Phase 2 visit.  Vital signs and session notes recorded in [employer] and will be scanned into Epic by HIM.

## 2021-11-22 ENCOUNTER — TREATMENT (OUTPATIENT)
Dept: CARDIAC REHAB | Facility: HOSPITAL | Age: 46
End: 2021-11-22

## 2021-11-22 DIAGNOSIS — I21.4 NSTEMI (NON-ST ELEVATION MYOCARDIAL INFARCTION) (HCC): ICD-10-CM

## 2021-11-22 DIAGNOSIS — Z95.5 STATUS POST PRIMARY ANGIOPLASTY WITH CORONARY STENT: Primary | ICD-10-CM

## 2021-11-22 PROCEDURE — 93798 PHYS/QHP OP CAR RHAB W/ECG: CPT

## 2021-11-22 NOTE — PROGRESS NOTES
Pt was seen today in CR for a Phase 2 visit.  Vital signs and session notes recorded in [employer] and will be scanned into Epic by HIM.

## 2021-11-29 ENCOUNTER — TREATMENT (OUTPATIENT)
Dept: CARDIAC REHAB | Facility: HOSPITAL | Age: 46
End: 2021-11-29

## 2021-11-29 DIAGNOSIS — Z95.5 STATUS POST PRIMARY ANGIOPLASTY WITH CORONARY STENT: Primary | ICD-10-CM

## 2021-11-29 DIAGNOSIS — I21.4 NSTEMI (NON-ST ELEVATION MYOCARDIAL INFARCTION) (HCC): ICD-10-CM

## 2021-11-29 PROCEDURE — 93798 PHYS/QHP OP CAR RHAB W/ECG: CPT

## 2021-11-29 NOTE — PROGRESS NOTES
Pt was seen today in CR for a Phase 2 visit.  Vital signs and session notes recorded in [employer] and will be scanned into Epic by HIM.

## 2021-12-06 ENCOUNTER — TREATMENT (OUTPATIENT)
Dept: CARDIAC REHAB | Facility: HOSPITAL | Age: 46
End: 2021-12-06

## 2021-12-06 DIAGNOSIS — I21.4 NSTEMI (NON-ST ELEVATION MYOCARDIAL INFARCTION) (HCC): ICD-10-CM

## 2021-12-06 DIAGNOSIS — Z95.5 STATUS POST PRIMARY ANGIOPLASTY WITH CORONARY STENT: Primary | ICD-10-CM

## 2021-12-06 PROCEDURE — 93798 PHYS/QHP OP CAR RHAB W/ECG: CPT

## 2021-12-06 NOTE — PROGRESS NOTES
Pt was seen today in CR for a Phase 2 visit.  Vital signs and session notes recorded in [employer] and will be scanned into Epic by HIM.

## 2021-12-13 ENCOUNTER — TREATMENT (OUTPATIENT)
Dept: CARDIAC REHAB | Facility: HOSPITAL | Age: 46
End: 2021-12-13

## 2021-12-13 DIAGNOSIS — Z95.5 STATUS POST PRIMARY ANGIOPLASTY WITH CORONARY STENT: Primary | ICD-10-CM

## 2021-12-13 DIAGNOSIS — I21.4 NSTEMI (NON-ST ELEVATION MYOCARDIAL INFARCTION) (HCC): ICD-10-CM

## 2021-12-13 PROCEDURE — 93798 PHYS/QHP OP CAR RHAB W/ECG: CPT

## 2021-12-13 NOTE — PROGRESS NOTES
Pt was seen today in CR for a Phase 2 visit.  Vital signs and session notes recorded in [employer] and will be scanned into Epic by HIM.

## 2021-12-18 LAB
ALBUMIN SERPL-MCNC: 4.6 G/DL (ref 3.5–5.2)
ALBUMIN/GLOB SERPL: 2.3 G/DL
ALP SERPL-CCNC: 85 U/L (ref 39–117)
ALT SERPL-CCNC: 24 U/L (ref 1–41)
AST SERPL-CCNC: 15 U/L (ref 1–40)
BASOPHILS # BLD AUTO: 0.04 10*3/MM3 (ref 0–0.2)
BASOPHILS NFR BLD AUTO: 0.6 % (ref 0–1.5)
BILIRUB SERPL-MCNC: 0.7 MG/DL (ref 0–1.2)
BUN SERPL-MCNC: 12 MG/DL (ref 6–20)
BUN/CREAT SERPL: 12.6 (ref 7–25)
CALCIUM SERPL-MCNC: 9.7 MG/DL (ref 8.6–10.5)
CHLORIDE SERPL-SCNC: 106 MMOL/L (ref 98–107)
CHOLEST SERPL-MCNC: 95 MG/DL (ref 0–200)
CO2 SERPL-SCNC: 30.1 MMOL/L (ref 22–29)
CREAT SERPL-MCNC: 0.95 MG/DL (ref 0.76–1.27)
EOSINOPHIL # BLD AUTO: 0.13 10*3/MM3 (ref 0–0.4)
EOSINOPHIL NFR BLD AUTO: 2 % (ref 0.3–6.2)
ERYTHROCYTE [DISTWIDTH] IN BLOOD BY AUTOMATED COUNT: 12.6 % (ref 12.3–15.4)
GLOBULIN SER CALC-MCNC: 2 GM/DL
GLUCOSE SERPL-MCNC: 106 MG/DL (ref 65–99)
HBA1C MFR BLD: 5.19 % (ref 4.8–5.6)
HCT VFR BLD AUTO: 44.4 % (ref 37.5–51)
HDLC SERPL-MCNC: 36 MG/DL (ref 40–60)
HGB BLD-MCNC: 14.6 G/DL (ref 13–17.7)
IMM GRANULOCYTES # BLD AUTO: 0.01 10*3/MM3 (ref 0–0.05)
IMM GRANULOCYTES NFR BLD AUTO: 0.2 % (ref 0–0.5)
LDLC SERPL CALC-MCNC: 45 MG/DL (ref 0–100)
LYMPHOCYTES # BLD AUTO: 1.46 10*3/MM3 (ref 0.7–3.1)
LYMPHOCYTES NFR BLD AUTO: 23 % (ref 19.6–45.3)
MCH RBC QN AUTO: 29.9 PG (ref 26.6–33)
MCHC RBC AUTO-ENTMCNC: 32.9 G/DL (ref 31.5–35.7)
MCV RBC AUTO: 91 FL (ref 79–97)
MONOCYTES # BLD AUTO: 0.48 10*3/MM3 (ref 0.1–0.9)
MONOCYTES NFR BLD AUTO: 7.5 % (ref 5–12)
NEUTROPHILS # BLD AUTO: 4.24 10*3/MM3 (ref 1.7–7)
NEUTROPHILS NFR BLD AUTO: 66.7 % (ref 42.7–76)
NRBC BLD AUTO-RTO: 0 /100 WBC (ref 0–0.2)
PLATELET # BLD AUTO: 246 10*3/MM3 (ref 140–450)
POTASSIUM SERPL-SCNC: 4.5 MMOL/L (ref 3.5–5.2)
PROT SERPL-MCNC: 6.6 G/DL (ref 6–8.5)
RBC # BLD AUTO: 4.88 10*6/MM3 (ref 4.14–5.8)
SODIUM SERPL-SCNC: 143 MMOL/L (ref 136–145)
TRIGL SERPL-MCNC: 59 MG/DL (ref 0–150)
VLDLC SERPL CALC-MCNC: 14 MG/DL (ref 5–40)
WBC # BLD AUTO: 6.36 10*3/MM3 (ref 3.4–10.8)

## 2021-12-20 ENCOUNTER — APPOINTMENT (OUTPATIENT)
Dept: CARDIAC REHAB | Facility: HOSPITAL | Age: 46
End: 2021-12-20

## 2021-12-27 ENCOUNTER — OFFICE VISIT (OUTPATIENT)
Dept: INTERNAL MEDICINE | Facility: CLINIC | Age: 46
End: 2021-12-27

## 2021-12-27 VITALS
SYSTOLIC BLOOD PRESSURE: 108 MMHG | RESPIRATION RATE: 16 BRPM | WEIGHT: 217 LBS | HEIGHT: 67 IN | OXYGEN SATURATION: 98 % | HEART RATE: 50 BPM | BODY MASS INDEX: 34.06 KG/M2 | TEMPERATURE: 98 F | DIASTOLIC BLOOD PRESSURE: 69 MMHG

## 2021-12-27 DIAGNOSIS — E78.2 MIXED HYPERLIPIDEMIA: ICD-10-CM

## 2021-12-27 DIAGNOSIS — R73.01 IMPAIRED FASTING GLUCOSE: ICD-10-CM

## 2021-12-27 DIAGNOSIS — I10 BENIGN ESSENTIAL HYPERTENSION: Primary | ICD-10-CM

## 2021-12-27 PROCEDURE — 99214 OFFICE O/P EST MOD 30 MIN: CPT | Performed by: INTERNAL MEDICINE

## 2021-12-29 ENCOUNTER — APPOINTMENT (OUTPATIENT)
Dept: SLEEP MEDICINE | Facility: HOSPITAL | Age: 46
End: 2021-12-29

## 2022-01-11 ENCOUNTER — TELEPHONE (OUTPATIENT)
Dept: CARDIOLOGY | Facility: CLINIC | Age: 47
End: 2022-01-11

## 2022-01-11 NOTE — TELEPHONE ENCOUNTER
He had 3 drug-coated stents placed on 10/5/2021.  He must continue uninterrupted aspirin and Brilinta THROUGH 10/2022.  Failure to do so could cause heart attack or death.  Please advise the patient/dentist of this.  Thanks.

## 2022-01-11 NOTE — TELEPHONE ENCOUNTER
Patient needs to be office Brilinta for 24 hrs before root canal. Please send clearance to patient through My Chart and call dentist.  Their fax machine is down.

## 2022-01-18 ENCOUNTER — LAB (OUTPATIENT)
Dept: LAB | Facility: HOSPITAL | Age: 47
End: 2022-01-18

## 2022-01-18 DIAGNOSIS — Z03.818 ENCOUNTER FOR PATIENT CONCERN ABOUT EXPOSURE TO INFECTIOUS ORGANISM: Primary | ICD-10-CM

## 2022-01-18 LAB — SARS-COV-2 RNA NOSE QL NAA+PROBE: DETECTED

## 2022-01-18 PROCEDURE — U0004 COV-19 TEST NON-CDC HGH THRU: HCPCS

## 2022-01-21 ENCOUNTER — TELEPHONE (OUTPATIENT)
Dept: INTERNAL MEDICINE | Facility: CLINIC | Age: 47
End: 2022-01-21

## 2022-01-21 NOTE — TELEPHONE ENCOUNTER
Please advise. Are you agreeable that I tell patient to treat symptoms with OTC medication and then follow up next week or do you require a visit?

## 2022-01-21 NOTE — TELEPHONE ENCOUNTER
Caller: Darcy Lomeli    Relationship: Emergency Contact    Best call back number: [phone]    What medication are you requesting:     What are your current symptoms: SEVERE SORE THROAT    How long have you been experiencing symptoms: 3 DAYS    Have you had these symptoms before:    [] Yes  [] No    Have you been treated for these symptoms before:   [] Yes  [] No    If a prescription is needed, what is your preferred pharmacy and phone number: [facility] [address] [place] [address] [address] [place] [address] [phone] [facility] [phone]      Additional notes: PATIENT IS COVID POSITIVE

## 2022-01-28 RX ORDER — TICAGRELOR 90 MG/1
90 TABLET ORAL 2 TIMES DAILY
COMMUNITY
Start: 2021-12-28 | End: 2022-01-28 | Stop reason: SDUPTHER

## 2022-01-28 RX ORDER — TICAGRELOR 90 MG/1
90 TABLET ORAL 2 TIMES DAILY
Qty: 60 TABLET | Refills: 1 | COMMUNITY
Start: 2022-01-28 | End: 2022-02-07 | Stop reason: CLARIF

## 2022-02-07 RX ORDER — CLOPIDOGREL BISULFATE 75 MG/1
75 TABLET ORAL DAILY
Qty: 90 TABLET | Refills: 3 | Status: SHIPPED | OUTPATIENT
Start: 2022-02-07 | End: 2022-03-17

## 2022-02-07 NOTE — TELEPHONE ENCOUNTER
CONTACTED PATIENT IN REGARDS TO INSURANCE DENIAL FOR ELIQUIS. PER INSURANCE PATIENT HAS TO HAVE TRIED EITHER PLAVIX OR EFFIENT.  PER DR. GARCIA PLAVIX 75 MG SENT TO PHARMACY.

## 2022-03-09 ENCOUNTER — OFFICE VISIT (OUTPATIENT)
Dept: INTERNAL MEDICINE | Facility: CLINIC | Age: 47
End: 2022-03-09

## 2022-03-09 VITALS
SYSTOLIC BLOOD PRESSURE: 139 MMHG | BODY MASS INDEX: 32.96 KG/M2 | WEIGHT: 210 LBS | RESPIRATION RATE: 16 BRPM | HEIGHT: 67 IN | OXYGEN SATURATION: 98 % | DIASTOLIC BLOOD PRESSURE: 81 MMHG | HEART RATE: 89 BPM | TEMPERATURE: 97.7 F

## 2022-03-09 DIAGNOSIS — L50.0 ALLERGIC URTICARIA: ICD-10-CM

## 2022-03-09 DIAGNOSIS — I10 BENIGN ESSENTIAL HYPERTENSION: Primary | ICD-10-CM

## 2022-03-09 DIAGNOSIS — L29.9 PRURITIC DERMATITIS: ICD-10-CM

## 2022-03-09 PROCEDURE — 99214 OFFICE O/P EST MOD 30 MIN: CPT | Performed by: INTERNAL MEDICINE

## 2022-03-09 PROCEDURE — 96372 THER/PROPH/DIAG INJ SC/IM: CPT | Performed by: INTERNAL MEDICINE

## 2022-03-09 RX ORDER — METHYLPREDNISOLONE 4 MG/1
TABLET ORAL
Qty: 21 TABLET | Refills: 0 | OUTPATIENT
Start: 2022-03-09 | End: 2022-03-11

## 2022-03-09 RX ORDER — METHYLPREDNISOLONE SODIUM SUCCINATE 125 MG/2ML
125 INJECTION, POWDER, LYOPHILIZED, FOR SOLUTION INTRAMUSCULAR; INTRAVENOUS ONCE
Status: COMPLETED | OUTPATIENT
Start: 2022-03-09 | End: 2022-03-09

## 2022-03-09 RX ADMIN — METHYLPREDNISOLONE SODIUM SUCCINATE 125 MG: 125 INJECTION, POWDER, LYOPHILIZED, FOR SOLUTION INTRAMUSCULAR; INTRAVENOUS at 16:20

## 2022-03-09 NOTE — PROGRESS NOTES
"Chief Complaint  Rash (Started last night has been taking Clindamycin for root canal x2 weeks and stopped 4-5 days ago . Rash on several areas of body with swelling in hands) and Hypertension    Subjective          Zachery Lomeli presents to [facility]  History of Present Illness  Patient is here to follow-up on his blood pressure and is taking medication as prescribed, he was started last night although was already at this time and also states that his hands are swelling, he states he took clindamycin for his root canal for 2 weeks but he stopped that nearly 5 days ago, he is unsure of what he got exposed to    Objective   Vital Signs:   /81   Pulse 89   Temp 97.7 °F (36.5 °C)   Resp 16   Ht 170.2 cm (67.01\")   Wt 95.3 kg (210 lb)   SpO2 98%   BMI 32.88 kg/m²     Physical Exam  Vitals and nursing note reviewed.   Constitutional:       General: He is not in acute distress.     Appearance: Normal appearance. He is not diaphoretic.   HENT:      Head: Normocephalic and atraumatic.      Right Ear: External ear normal.      Left Ear: External ear normal.      Nose: Nose normal.   Eyes:      Extraocular Movements: Extraocular movements intact.      Conjunctiva/sclera: Conjunctivae normal.   Neck:      Trachea: Trachea normal.   Cardiovascular:      Rate and Rhythm: Normal rate and regular rhythm.      Heart sounds: Normal heart sounds.   Pulmonary:      Effort: Pulmonary effort is normal. No respiratory distress.   Abdominal:      General: Abdomen is flat.   Musculoskeletal:      Cervical back: Neck supple.      Comments: Moves all limbs   Skin:     General: Skin is warm and dry.      Findings: Rash present. No erythema.      Comments: Allergic macular papular rash all over body   Neurological:      Mental Status: He is alert and oriented to person, place, and time.      Comments: No gross motor or sensory deficits        Result Review :     CMP    CMP 10/5/21 10/6/21 12/17/21 "   Glucose 126 (A) 116 (A) 106 (A)   BUN 15 10 12   Creatinine 1.07 0.96 0.95   eGFR Non  Am 74 84 85   eGFR African Am   103   Sodium 138 138 143   Potassium 4.1 3.6 4.5   Chloride 103 103 106   Calcium 9.2 9.0 9.7   Total Protein   6.6   Albumin   4.60   Globulin   2.0   Total Bilirubin   0.7   Alkaline Phosphatase   85   AST (SGOT)   15   ALT (SGPT)   24   (A) Abnormal value       Comments are available for some flowsheets but are not being displayed.           CBC    CBC 10/5/21 10/5/21 10/6/21 12/17/21    0010 0530     WBC 8.73 7.35 8.65 6.36   RBC 5.25 4.93 4.74 4.88   Hemoglobin 15.6 14.8 14.3 14.6   Hematocrit 43.6 41.9 39.4 44.4   MCV 83.0 85.0 83.1 91.0   MCH 29.7 30.0 30.2 29.9   MCHC 35.8 (A) 35.3 36.3 (A) 32.9   RDW 11.9 (A) 11.9 (A) 12.2 (A) 12.6   Platelets 254 259 228 246   (A) Abnormal value                      Assessment and Plan    Diagnoses and all orders for this visit:    1. Benign essential hypertension (Primary)    2. Allergic urticaria  -     methylPREDNISolone sodium succinate (SOLU-Medrol) injection 125 mg  -     methylPREDNISolone (MEDROL) 4 MG dose pack; Take as directed on package instructions.  Dispense: 21 tablet; Refill: 0    3. Pruritic dermatitis    Plan:  1.  Benign essential hypertension: Will continue current medication, low-sodium diet advised, Counseled to regularly check BP at home with goal averaging <130/80.   2.allergic urticaria: IM Solu-Medrol given in office today, start Medrol Dosepak, OTC antihistamine  3.  Pruritic dermatitis: IM Solu-Medrol given in office today, start Medrol Dosepak, OTC antihistamine  Patient advised to the ER if  symptoms worsen  I spent 30 minutes caring for Zachery on this date of service. This time includes time spent by me in the following activities:preparing for the visit, reviewing tests, performing a medically appropriate examination and/or evaluation , counseling and educating the patient/family/caregiver, ordering medications,  tests, or procedures and documenting information in the medical record  Follow Up    Patient was given instructions and counseling regarding his condition or for health maintenance advice. Please see specific information pulled into the AVS if appropriate.

## 2022-03-10 ENCOUNTER — NURSE TRIAGE (OUTPATIENT)
Dept: CALL CENTER | Facility: HOSPITAL | Age: 47
End: 2022-03-10

## 2022-03-10 ENCOUNTER — TELEPHONE (OUTPATIENT)
Dept: INTERNAL MEDICINE | Facility: CLINIC | Age: 47
End: 2022-03-10

## 2022-03-10 ENCOUNTER — OFFICE VISIT (OUTPATIENT)
Dept: CARDIOLOGY | Facility: CLINIC | Age: 47
End: 2022-03-10

## 2022-03-10 VITALS
OXYGEN SATURATION: 98 % | SYSTOLIC BLOOD PRESSURE: 122 MMHG | HEIGHT: 67 IN | HEART RATE: 77 BPM | WEIGHT: 212 LBS | BODY MASS INDEX: 33.27 KG/M2 | DIASTOLIC BLOOD PRESSURE: 78 MMHG

## 2022-03-10 DIAGNOSIS — E78.5 DYSLIPIDEMIA: ICD-10-CM

## 2022-03-10 DIAGNOSIS — E66.2 CLASS 1 OBESITY WITH ALVEOLAR HYPOVENTILATION WITHOUT SERIOUS COMORBIDITY WITH BODY MASS INDEX (BMI) OF 33.0 TO 33.9 IN ADULT: ICD-10-CM

## 2022-03-10 DIAGNOSIS — I35.8 AORTIC VALVE SCLEROSIS: ICD-10-CM

## 2022-03-10 DIAGNOSIS — I25.10 CORONARY ARTERY DISEASE INVOLVING NATIVE CORONARY ARTERY OF NATIVE HEART WITHOUT ANGINA PECTORIS: ICD-10-CM

## 2022-03-10 DIAGNOSIS — I10 BENIGN ESSENTIAL HYPERTENSION: Primary | ICD-10-CM

## 2022-03-10 PROBLEM — E66.811 CLASS 1 OBESITY WITH ALVEOLAR HYPOVENTILATION WITHOUT SERIOUS COMORBIDITY WITH BODY MASS INDEX (BMI) OF 33.0 TO 33.9 IN ADULT: Status: ACTIVE | Noted: 2022-03-10

## 2022-03-10 PROCEDURE — 99213 OFFICE O/P EST LOW 20 MIN: CPT | Performed by: INTERNAL MEDICINE

## 2022-03-10 RX ORDER — DOXYCYCLINE 100 MG/1
100 CAPSULE ORAL 2 TIMES DAILY
Qty: 42 CAPSULE | Refills: 0 | Status: SHIPPED | OUTPATIENT
Start: 2022-03-10 | End: 2022-03-20

## 2022-03-10 NOTE — TELEPHONE ENCOUNTER
Caller: Zachery Lomeli    Relationship: Self    Best call back number: [phone]    What is the best time to reach you: ANY TIME     Who are you requesting to speak with (clinical staff, provider,  specific staff member): DR. STEINER OR HER NURSE     Do you know the name of the person who called: N/A    What was the call regarding: PATIENT WAS SEEN YESTERDAY 03/09/22 AND DR STEINER THOUGHT THE RASH HE HAS MAY HAVE BEEN FROM SOMETHING HE ATE. PATIENT JUST PULLED A TICK OFF OF HIM SO HE IS WONDERING IF THAT MAY BE THE CAUSE. PLEASE ADVISE AND CALL PATIENT     Do you require a callback: YES

## 2022-03-10 NOTE — TELEPHONE ENCOUNTER
Called spoke to Darcy the patient's wife (on [facility]) she is aware that per Dr. Bolivar the patient needs to go to the ER if his symptoms are worsening. She verbalized understanding.

## 2022-03-10 NOTE — TELEPHONE ENCOUNTER
Reason for Disposition  • Mild facial swelling from food reaction and diagnosis already confirmed    Additional Information  • Negative: Life-threatening reaction (anaphylaxis) in the past to similar substance (e.g., food, insect bite/sting, chemical, etc.) and < 2 hours since exposure  • Negative: Unresponsive, passed out or very weak  • Negative: Swollen tongue  • Negative: Difficulty breathing or wheezing  • Negative: Sounds like a life-threatening emergency to the triager  • Negative: Followed an injury to face  • Negative: Bee sting and within last 24 hours  • Negative: Mosquito bite suspected  • Negative: Insect bite suspected  • Negative: SEVERE swelling of entire face and < 2 hours since exposure to high-risk allergen (e.g., peanuts, tree nuts, fish, shellfish or 1st dose of drug) and no serious symptoms AND [4] no serious allergic reaction in the past  • Negative: Pregnant 20 or more weeks  • Negative: Postpartum (from 0 to 6 weeks after delivery)  • Negative: Fever  • Negative: Taking an ACE Inhibitor medication  (e.g., benazepril/LOTENSIN, captopril/CAPOTEN, enalapril/VASOTEC, lisinopril/ZESTRIL)  • Negative: Patient sounds very sick or weak to the triager  • Negative: SEVERE swelling of the entire face  • Negative: Swelling began after taking a drug  • Negative: Looks infected (e.g., spreading redness, pus)  • Negative: Looks like a boil or infected sore  • Negative: Painful rash with multiple small blisters grouped together (i.e., dermatomal distribution or 'band' or 'stripe')  • Negative: Swelling of both lower legs (i.e., bilateral pedal edema)  • Negative: Widespread rash on body  • Negative: Swelling is painful to touch  • Negative: Toothache  • Negative: Patient wants to be seen  • Negative: Mild facial swelling (puffiness) and persists > 3 days  • Negative: Mild facial swelling from food reaction and diagnosis never confirmed by a physician  • Negative: Face swelling is a chronic problem  "(recurrent or ongoing AND present > 4 weeks) and cause unknown    Answer Assessment - Initial Assessment Questions  1. ONSET: \"When did the swelling start?\" (e.g., minutes, hours, days)      Today  2. LOCATION: \"What part of the face is swollen?\"      earlobe  3. SEVERITY: \"How swollen is it?\"      Noticeably swollen  4. ITCHING: \"Is there any itching?\" If Yes, ask: \"How much?\"   (Scale 1-10; mild, moderate or severe)      no  5. PAIN: \"Is the swelling painful to touch?\" If Yes, ask: \"How painful is it?\"   (Scale 1-10; mild, moderate or severe)      no  6. FEVER: \"Do you have a fever?\" If Yes, ask: \"What is it, how was it measured, and when did it start?\"       No fever  7. CAUSE: \"What do you think is causing the face swelling?\"      He had reaction to unknown food, possibly chicken. Today found tick on his leg and wondered if this is related.    8. RECURRENT SYMPTOM: \"Have you had face swelling before?\" If Yes, ask: \"When was the last time?\" \"What happened that time?\"      No yesterday he had head to toe rash with swelling of his feet  9. OTHER SYMPTOMS: \"Do you have any other symptoms?\" (e.g., toothache, leg swelling)      Hives itching  10. PREGNANCY: \"Is there any chance you are pregnant?\" \"When was your last menstrual period?\"        na    Protocols used: FACE SWELLING-ADULT-[place]      "

## 2022-03-10 NOTE — PROGRESS NOTES
Subjective:     Encounter Date:03/10/2022      Patient ID: Zachery Lomeli is a 46 y.o. male.    Chief Complaint: Rash  HPI  This is a 46-year-old male patient who presents to cardiology clinic for routine follow-up.  The patient has done well from a cardiovascular perspective with no recurrent angina.  The patient reports being active and works daily without exertional symptoms or limitations.  Since his last visit, he has had no cardiovascular symptoms, issues or hospitalizations.  The patient indicates he recently had a root canal and was started on antibiotics.  He has subsequently developed a prominent drug eruption on upper extremities and lower torso.  The patient has been given a shot of steroids, started on a Medrol Dosepak and given over-the-counter Benadryl without improvement.  He discontinued antibiotic therapy 2 days ago.  He remains a non-smoker.  He reports compliance with his medications with no perceived side effects.  There has been no change in any of his cardiac medications prior to the development of rash.  The following portions of the patient's history were reviewed and updated as appropriate: allergies, current medications, past family history, past medical history, past social history, past surgical history and problem  Review of Systems   Constitutional: Negative for chills, diaphoresis, fever, malaise/fatigue, weight gain and weight loss.   HENT: Negative for ear discharge, hearing loss, hoarse voice and nosebleeds.    Eyes: Negative for discharge, double vision, pain and photophobia.   Cardiovascular: Negative for chest pain, claudication, cyanosis, dyspnea on exertion, irregular heartbeat, leg swelling, near-syncope, orthopnea, palpitations, paroxysmal nocturnal dyspnea and syncope.   Respiratory: Negative for cough, hemoptysis, shortness of breath, sputum production and wheezing.    Endocrine: Negative for cold intolerance, heat intolerance, polydipsia, polyphagia and polyuria.    Hematologic/Lymphatic: Negative for adenopathy and bleeding problem. Does not bruise/bleed easily.   Skin: Negative for color change, flushing, itching and rash.   Musculoskeletal: Negative for muscle cramps, muscle weakness, myalgias and stiffness.   Gastrointestinal: Negative for abdominal pain, diarrhea, hematemesis, hematochezia, nausea and vomiting.   Genitourinary: Negative for dysuria, frequency and nocturia.   Neurological: Negative for focal weakness, loss of balance, numbness, paresthesias and seizures.   Psychiatric/Behavioral: Negative for altered mental status, hallucinations and suicidal ideas.   Allergic/Immunologic: Negative for HIV exposure, hives and persistent infections.           Current Outpatient Medications:   •  aspirin 81 MG EC tablet, Take 81 mg by mouth Daily., Disp: , Rfl:   •  atorvastatin (LIPITOR) 80 MG tablet, Take 1 tablet by mouth Every Night., Disp: 90 tablet, Rfl: 3  •  Cholecalciferol (VITAMIN D-3) 1000 UNITS capsule, Take 1,000 Units by mouth Daily., Disp: , Rfl:   •  clopidogrel (PLAVIX) 75 MG tablet, Take 1 tablet by mouth Daily., Disp: 90 tablet, Rfl: 3  •  losartan-hydrochlorothiazide (Hyzaar) 50-12.5 MG per tablet, Take 1 tablet by mouth Daily., Disp: 30 tablet, Rfl: 5  •  methylPREDNISolone (MEDROL) 4 MG dose pack, Take as directed on package instructions., Disp: 21 tablet, Rfl: 0  •  metoprolol succinate XL (TOPROL-XL) 50 MG 24 hr tablet, Take 1 tablet by mouth Daily., Disp: 90 tablet, Rfl: 3  •  vitamin B-12 (CYANOCOBALAMIN) 1000 MCG tablet, Take 1,000 mcg by mouth Daily., Disp: , Rfl:   •  doxycycline (MONODOX) 100 MG capsule, Take 1 capsule by mouth 2 (Two) Times a Day for 10 days., Disp: 42 capsule, Rfl: 0    Objective:   Vitals and nursing note reviewed.   Constitutional:       Appearance: Healthy appearance. Not in distress.   Neck:      Vascular: No JVR. JVD normal.   Pulmonary:      Effort: Pulmonary effort is normal.      Breath sounds: Normal breath sounds.  "No wheezing. No rhonchi. No rales.   Chest:      Chest wall: Not tender to palpatation.   Cardiovascular:      PMI at left midclavicular line. Normal rate. Regular rhythm. Normal S1. Normal S2.      Murmurs: There is no murmur.      No gallop. No click. No rub.   Pulses:     Intact distal pulses.   Edema:     Peripheral edema absent.   Abdominal:      General: Bowel sounds are normal.      Palpations: Abdomen is soft.      Tenderness: There is no abdominal tenderness.   Musculoskeletal: Normal range of motion.         General: No tenderness. Skin:     General: Skin is warm and dry.   Neurological:      General: No focal deficit present.      Mental Status: Alert and oriented to person, place and time.       Blood pressure 122/78, pulse 77, height 170.2 cm (67\"), weight 96.2 kg (212 lb), SpO2 98 %.   Lab Review:     Assessment:       1. Benign essential hypertension  Acceptable blood pressure control.    2. 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    3. Dyslipidemia  Goal LDL cholesterol should be less than 70 mg/dL.  The patient is tolerating high intensity statin based cholesterol-lowering therapy without side effects.  Further management deferred to primary care provider.    4. Aortic valve sclerosis  The patient has a typical murmur of aortic valve sclerosis.  Previous echocardiogram showed no evidence of aortic valve stenosis.    5. 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    Procedures    Plan:     The patient has been congratulated on his weight loss.  He is encouraged to continue these endeavors.    The patient has been congratulated on his smoking cessation and cautioned to never again resume cigarette " "smoking.    The patient is advised that if his rash does not improve in the next 24-48 hours to seek an opinion from a dermatologist.    If this is felt to be a drug eruption related to his antiplatelet therapy or his cardiac medications, I would recommend interrupting these \"one at a time\" with replacement utilizing a similar alternative medication.  For example, if Plavix therapy is interrupted, I would recommend Effient 10 mg orally once per day for 1-2 weeks to see if there is improvement.  If Lipitor therapy was interrupted I would recommend substituting with Crestor 20 mg orally once per day.  If Toprol-XL therapy was interrupted I would recommend Bystolic 10 mg orally once per day.  If Hyzaar therapy was interrupted, I would recommend Diovan 80 mg orally twice per day without the HCTZ component.  I would wait at least 1-2 weeks after each medication change to assess for improvement in the rash before making additional changes.    No cardiovascular testing is warranted at this time.      "

## 2022-03-11 ENCOUNTER — HOSPITAL ENCOUNTER (EMERGENCY)
Facility: HOSPITAL | Age: 47
Discharge: HOME | End: 2022-03-11
Attending: EMERGENCY MEDICINE | Admitting: EMERGENCY MEDICINE

## 2022-03-11 VITALS
HEIGHT: 67 IN | OXYGEN SATURATION: 96 % | WEIGHT: 210 LBS | RESPIRATION RATE: 16 BRPM | TEMPERATURE: 98.6 F | SYSTOLIC BLOOD PRESSURE: 123 MMHG | DIASTOLIC BLOOD PRESSURE: 78 MMHG | HEART RATE: 69 BPM | BODY MASS INDEX: 32.96 KG/M2

## 2022-03-11 DIAGNOSIS — T78.40XA ALLERGIC REACTION, INITIAL ENCOUNTER: Primary | ICD-10-CM

## 2022-03-11 DIAGNOSIS — L50.9 HIVES: ICD-10-CM

## 2022-03-11 LAB
HOLD SPECIMEN: NORMAL
HOLD SPECIMEN: NORMAL
WHOLE BLOOD HOLD SPECIMEN: NORMAL
WHOLE BLOOD HOLD SPECIMEN: NORMAL

## 2022-03-11 PROCEDURE — 25010000002 EPINEPHRINE (ANAPHYLAXIS) 1 MG/ML SOLUTION: Performed by: EMERGENCY MEDICINE

## 2022-03-11 PROCEDURE — 96376 TX/PRO/DX INJ SAME DRUG ADON: CPT

## 2022-03-11 PROCEDURE — 25010000002 DIPHENHYDRAMINE PER 50 MG: Performed by: EMERGENCY MEDICINE

## 2022-03-11 PROCEDURE — 25010000002 METHYLPREDNISOLONE PER 125 MG: Performed by: EMERGENCY MEDICINE

## 2022-03-11 PROCEDURE — 96372 THER/PROPH/DIAG INJ SC/IM: CPT

## 2022-03-11 PROCEDURE — 99283 EMERGENCY DEPT VISIT LOW MDM: CPT

## 2022-03-11 PROCEDURE — 96375 TX/PRO/DX INJ NEW DRUG ADDON: CPT

## 2022-03-11 PROCEDURE — 99282 EMERGENCY DEPT VISIT SF MDM: CPT

## 2022-03-11 PROCEDURE — 96374 THER/PROPH/DIAG INJ IV PUSH: CPT

## 2022-03-11 RX ORDER — PREDNISONE 20 MG/1
TABLET ORAL
Qty: 18 TABLET | Refills: 0 | Status: SHIPPED | OUTPATIENT
Start: 2022-03-11 | End: 2022-03-20

## 2022-03-11 RX ORDER — SODIUM CHLORIDE 0.9 % (FLUSH) 0.9 %
10 SYRINGE (ML) INJECTION AS NEEDED
Status: DISCONTINUED | OUTPATIENT
Start: 2022-03-11 | End: 2022-03-11 | Stop reason: HOSPADM

## 2022-03-11 RX ORDER — METHYLPREDNISOLONE SODIUM SUCCINATE 125 MG/2ML
125 INJECTION, POWDER, LYOPHILIZED, FOR SOLUTION INTRAMUSCULAR; INTRAVENOUS ONCE
Status: COMPLETED | OUTPATIENT
Start: 2022-03-11 | End: 2022-03-11

## 2022-03-11 RX ORDER — EPINEPHRINE 1 MG/ML
0.3 INJECTION, SOLUTION INTRAMUSCULAR; SUBCUTANEOUS ONCE
Status: COMPLETED | OUTPATIENT
Start: 2022-03-11 | End: 2022-03-11

## 2022-03-11 RX ORDER — DIPHENHYDRAMINE HYDROCHLORIDE 50 MG/ML
25 INJECTION INTRAMUSCULAR; INTRAVENOUS ONCE
Status: COMPLETED | OUTPATIENT
Start: 2022-03-11 | End: 2022-03-11

## 2022-03-11 RX ORDER — DIPHENHYDRAMINE HYDROCHLORIDE 50 MG/ML
50 INJECTION INTRAMUSCULAR; INTRAVENOUS ONCE
Status: COMPLETED | OUTPATIENT
Start: 2022-03-11 | End: 2022-03-11

## 2022-03-11 RX ORDER — FAMOTIDINE 20 MG/1
20 TABLET, FILM COATED ORAL 2 TIMES DAILY
Qty: 14 TABLET | Refills: 0 | Status: SHIPPED | OUTPATIENT
Start: 2022-03-11 | End: 2023-02-06 | Stop reason: SDUPTHER

## 2022-03-11 RX ORDER — FAMOTIDINE 10 MG/ML
20 INJECTION, SOLUTION INTRAVENOUS ONCE
Status: COMPLETED | OUTPATIENT
Start: 2022-03-11 | End: 2022-03-11

## 2022-03-11 RX ADMIN — DIPHENHYDRAMINE HYDROCHLORIDE 25 MG: 50 INJECTION INTRAMUSCULAR; INTRAVENOUS at 01:28

## 2022-03-11 RX ADMIN — METHYLPREDNISOLONE SODIUM SUCCINATE 125 MG: 125 INJECTION, POWDER, FOR SOLUTION INTRAMUSCULAR; INTRAVENOUS at 01:27

## 2022-03-11 RX ADMIN — DIPHENHYDRAMINE HYDROCHLORIDE 50 MG: 50 INJECTION, SOLUTION INTRAMUSCULAR; INTRAVENOUS at 04:39

## 2022-03-11 RX ADMIN — FAMOTIDINE 20 MG: 10 INJECTION INTRAVENOUS at 01:28

## 2022-03-11 RX ADMIN — EPINEPHRINE 0.3 MG: 1 INJECTION INTRAMUSCULAR; INTRAVENOUS; SUBCUTANEOUS at 01:27
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
## 2022-03-12 ENCOUNTER — APPOINTMENT (OUTPATIENT)
Dept: GENERAL RADIOLOGY | Facility: HOSPITAL | Age: 47
End: 2022-03-12

## 2022-03-12 ENCOUNTER — HOSPITAL ENCOUNTER (EMERGENCY)
Facility: HOSPITAL | Age: 47
Discharge: HOME | End: 2022-03-12
Attending: EMERGENCY MEDICINE | Admitting: EMERGENCY MEDICINE

## 2022-03-12 VITALS
SYSTOLIC BLOOD PRESSURE: 120 MMHG | WEIGHT: 218.2 LBS | OXYGEN SATURATION: 94 % | HEIGHT: 67 IN | RESPIRATION RATE: 16 BRPM | DIASTOLIC BLOOD PRESSURE: 60 MMHG | TEMPERATURE: 98.1 F | HEART RATE: 60 BPM | BODY MASS INDEX: 34.25 KG/M2

## 2022-03-12 DIAGNOSIS — T78.40XA ALLERGIC REACTION, INITIAL ENCOUNTER: Primary | ICD-10-CM

## 2022-03-12 LAB
ALBUMIN SERPL-MCNC: 3.7 G/DL (ref 3.5–5.2)
ALBUMIN/GLOB SERPL: 1.5 G/DL
ALP SERPL-CCNC: 53 U/L (ref 39–117)
ALT SERPL W P-5'-P-CCNC: 15 U/L (ref 1–41)
ANION GAP SERPL CALCULATED.3IONS-SCNC: 11.7 MMOL/L (ref 5–15)
AST SERPL-CCNC: 12 U/L (ref 1–40)
BASOPHILS # BLD AUTO: 0.02 10*3/MM3 (ref 0–0.2)
BASOPHILS NFR BLD AUTO: 0.1 % (ref 0–1.5)
BILIRUB SERPL-MCNC: 0.5 MG/DL (ref 0–1.2)
BUN SERPL-MCNC: 25 MG/DL (ref 6–20)
BUN/CREAT SERPL: 26.6 (ref 7–25)
CALCIUM SPEC-SCNC: 8.9 MG/DL (ref 8.6–10.5)
CHLORIDE SERPL-SCNC: 102 MMOL/L (ref 98–107)
CO2 SERPL-SCNC: 25.3 MMOL/L (ref 22–29)
CREAT SERPL-MCNC: 0.94 MG/DL (ref 0.76–1.27)
DEPRECATED RDW RBC AUTO: 39.6 FL (ref 37–54)
EGFRCR SERPLBLD CKD-EPI 2021: 101.2 ML/MIN/1.73
EOSINOPHIL # BLD AUTO: 0.01 10*3/MM3 (ref 0–0.4)
EOSINOPHIL NFR BLD AUTO: 0.1 % (ref 0.3–6.2)
ERYTHROCYTE [DISTWIDTH] IN BLOOD BY AUTOMATED COUNT: 12.8 % (ref 12.3–15.4)
GLOBULIN UR ELPH-MCNC: 2.5 GM/DL
GLUCOSE SERPL-MCNC: 218 MG/DL (ref 65–99)
HCT VFR BLD AUTO: 38.6 % (ref 37.5–51)
HGB BLD-MCNC: 13.6 G/DL (ref 13–17.7)
IMM GRANULOCYTES # BLD AUTO: 0.14 10*3/MM3 (ref 0–0.05)
IMM GRANULOCYTES NFR BLD AUTO: 0.8 % (ref 0–0.5)
LYMPHOCYTES # BLD AUTO: 1.01 10*3/MM3 (ref 0.7–3.1)
LYMPHOCYTES NFR BLD AUTO: 5.6 % (ref 19.6–45.3)
MCH RBC QN AUTO: 30 PG (ref 26.6–33)
MCHC RBC AUTO-ENTMCNC: 35.2 G/DL (ref 31.5–35.7)
MCV RBC AUTO: 85.2 FL (ref 79–97)
MONOCYTES # BLD AUTO: 0.91 10*3/MM3 (ref 0.1–0.9)
MONOCYTES NFR BLD AUTO: 5 % (ref 5–12)
NEUTROPHILS NFR BLD AUTO: 16.04 10*3/MM3 (ref 1.7–7)
NEUTROPHILS NFR BLD AUTO: 88.4 % (ref 42.7–76)
NRBC BLD AUTO-RTO: 0 /100 WBC (ref 0–0.2)
PLATELET # BLD AUTO: 269 10*3/MM3 (ref 140–450)
PMV BLD AUTO: 10.1 FL (ref 6–12)
POTASSIUM SERPL-SCNC: 4.1 MMOL/L (ref 3.5–5.2)
PROT SERPL-MCNC: 6.2 G/DL (ref 6–8.5)
RBC # BLD AUTO: 4.53 10*6/MM3 (ref 4.14–5.8)
SODIUM SERPL-SCNC: 139 MMOL/L (ref 136–145)
TROPONIN T SERPL-MCNC: <0.01 NG/ML (ref 0–0.03)
WBC NRBC COR # BLD: 18.13 10*3/MM3 (ref 3.4–10.8)

## 2022-03-12 PROCEDURE — 96375 TX/PRO/DX INJ NEW DRUG ADDON: CPT

## 2022-03-12 PROCEDURE — 94799 UNLISTED PULMONARY SVC/PX: CPT

## 2022-03-12 PROCEDURE — 80053 COMPREHEN METABOLIC PANEL: CPT | Performed by: NURSE PRACTITIONER

## 2022-03-12 PROCEDURE — 71046 X-RAY EXAM CHEST 2 VIEWS: CPT

## 2022-03-12 PROCEDURE — 94761 N-INVAS EAR/PLS OXIMETRY MLT: CPT

## 2022-03-12 PROCEDURE — 99283 EMERGENCY DEPT VISIT LOW MDM: CPT

## 2022-03-12 PROCEDURE — 96374 THER/PROPH/DIAG INJ IV PUSH: CPT

## 2022-03-12 PROCEDURE — 36415 COLL VENOUS BLD VENIPUNCTURE: CPT

## 2022-03-12 PROCEDURE — 96376 TX/PRO/DX INJ SAME DRUG ADON: CPT

## 2022-03-12 PROCEDURE — 84484 ASSAY OF TROPONIN QUANT: CPT | Performed by: NURSE PRACTITIONER

## 2022-03-12 PROCEDURE — 93005 ELECTROCARDIOGRAM TRACING: CPT | Performed by: NURSE PRACTITIONER

## 2022-03-12 PROCEDURE — 96372 THER/PROPH/DIAG INJ SC/IM: CPT

## 2022-03-12 PROCEDURE — 94640 AIRWAY INHALATION TREATMENT: CPT

## 2022-03-12 PROCEDURE — 25010000002 EPINEPHRINE (ANAPHYLAXIS) 1 MG/ML SOLUTION: Performed by: NURSE PRACTITIONER

## 2022-03-12 PROCEDURE — 25010000002 METHYLPREDNISOLONE PER 125 MG: Performed by: NURSE PRACTITIONER

## 2022-03-12 PROCEDURE — 85025 COMPLETE CBC W/AUTO DIFF WBC: CPT | Performed by: NURSE PRACTITIONER

## 2022-03-12 PROCEDURE — 25010000002 DIPHENHYDRAMINE PER 50 MG: Performed by: NURSE PRACTITIONER

## 2022-03-12 RX ORDER — EPINEPHRINE 0.3 MG/.3ML
0.3 INJECTION SUBCUTANEOUS ONCE
Qty: 1 EACH | Refills: 0 | Status: SHIPPED | OUTPATIENT
Start: 2022-03-12 | End: 2022-03-12

## 2022-03-12 RX ORDER — SODIUM CHLORIDE 0.9 % (FLUSH) 0.9 %
10 SYRINGE (ML) INJECTION AS NEEDED
Status: DISCONTINUED | OUTPATIENT
Start: 2022-03-12 | End: 2022-03-12 | Stop reason: HOSPADM

## 2022-03-12 RX ORDER — FAMOTIDINE 10 MG/ML
20 INJECTION, SOLUTION INTRAVENOUS ONCE
Status: COMPLETED | OUTPATIENT
Start: 2022-03-12 | End: 2022-03-12

## 2022-03-12 RX ORDER — DIPHENHYDRAMINE HYDROCHLORIDE 50 MG/ML
25 INJECTION INTRAMUSCULAR; INTRAVENOUS ONCE
Status: COMPLETED | OUTPATIENT
Start: 2022-03-12 | End: 2022-03-12

## 2022-03-12 RX ORDER — METHYLPREDNISOLONE SODIUM SUCCINATE 125 MG/2ML
125 INJECTION, POWDER, LYOPHILIZED, FOR SOLUTION INTRAMUSCULAR; INTRAVENOUS ONCE
Status: COMPLETED | OUTPATIENT
Start: 2022-03-12 | End: 2022-03-12

## 2022-03-12 RX ORDER — IPRATROPIUM BROMIDE AND ALBUTEROL SULFATE 2.5; .5 MG/3ML; MG/3ML
3 SOLUTION RESPIRATORY (INHALATION) ONCE
Status: COMPLETED | OUTPATIENT
Start: 2022-03-12 | End: 2022-03-12

## 2022-03-12 RX ORDER — EPINEPHRINE 1 MG/ML
0.3 INJECTION, SOLUTION INTRAMUSCULAR; SUBCUTANEOUS ONCE
Status: COMPLETED | OUTPATIENT
Start: 2022-03-12 | End: 2022-03-12

## 2022-03-12 RX ORDER — DIPHENHYDRAMINE HCL 25 MG
25 TABLET ORAL EVERY 6 HOURS PRN
Qty: 30 TABLET | Refills: 0 | Status: SHIPPED | OUTPATIENT
Start: 2022-03-12

## 2022-03-12 RX ADMIN — METHYLPREDNISOLONE SODIUM SUCCINATE 125 MG: 125 INJECTION, POWDER, FOR SOLUTION INTRAMUSCULAR; INTRAVENOUS at 09:03

## 2022-03-12 RX ADMIN — IPRATROPIUM BROMIDE AND ALBUTEROL SULFATE 3 ML: 2.5; .5 SOLUTION RESPIRATORY (INHALATION) at 10:08

## 2022-03-12 RX ADMIN — DIPHENHYDRAMINE HYDROCHLORIDE 25 MG: 50 INJECTION, SOLUTION INTRAMUSCULAR; INTRAVENOUS at 08:59

## 2022-03-12 RX ADMIN — EPINEPHRINE 0.3 MG: 1 INJECTION INTRAMUSCULAR; INTRAVENOUS; SUBCUTANEOUS at 09:04

## 2022-03-12 RX ADMIN — DIPHENHYDRAMINE HYDROCHLORIDE 25 MG: 50 INJECTION INTRAMUSCULAR; INTRAVENOUS at 10:38

## 2022-03-12 RX ADMIN — SODIUM CHLORIDE 1000 ML: 9 INJECTION, SOLUTION INTRAVENOUS at 08:57

## 2022-03-12 RX ADMIN — FAMOTIDINE 20 MG: 10 INJECTION INTRAVENOUS at 09:01
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
## 2022-03-15 ENCOUNTER — TELEPHONE (OUTPATIENT)
Dept: INTERNAL MEDICINE | Facility: CLINIC | Age: 47
End: 2022-03-15

## 2022-03-15 NOTE — TELEPHONE ENCOUNTER
Caller: Darcy Lomeli    Relationship: Emergency Contact    Best call back number: [phone]    What medications are you currently taking:   Current Outpatient Medications on File Prior to Visit   Medication Sig Dispense Refill   • aspirin 81 MG EC tablet Take 81 mg by mouth Daily.     • atorvastatin (LIPITOR) 80 MG tablet Take 1 tablet by mouth Every Night. 90 tablet 3   • Cholecalciferol (VITAMIN D-3) 1000 UNITS capsule Take 1,000 Units by mouth Daily.     • clopidogrel (PLAVIX) 75 MG tablet Take 1 tablet by mouth Daily. 90 tablet 3   • diphenhydrAMINE (BENADRYL) 25 MG tablet Take 1 tablet by mouth Every 6 (Six) Hours As Needed for Itching. 30 tablet 0   • doxycycline (MONODOX) 100 MG capsule Take 1 capsule by mouth 2 (Two) Times a Day for 10 days. 42 capsule 0   • famotidine (PEPCID) 20 MG tablet Take 1 tablet by mouth 2 (Two) Times a Day. 14 tablet 0   • losartan-hydrochlorothiazide (Hyzaar) 50-12.5 MG per tablet Take 1 tablet by mouth Daily. 30 tablet 5   • metoprolol succinate XL (TOPROL-XL) 50 MG 24 hr tablet Take 1 tablet by mouth Daily. 90 tablet 3   • predniSONE (DELTASONE) 20 MG tablet Take 3 tablets by mouth Daily for 3 days, THEN 2 tablets Daily for 3 days, THEN 1 tablet Daily for 3 days. 18 tablet 0   • vitamin B-12 (CYANOCOBALAMIN) 1000 MCG tablet Take 1,000 mcg by mouth Daily.       No current facility-administered medications on file prior to visit.        Which medication are you concerned about: ASPIRIN    Who prescribed you this medication:DR D'BAKER    What are your concerns: DIO'S ALLERGIST TESTED AND FEELS DIO'S ALLERGIES ARE RELATED TO HIS ASPIRIN.  DARCY THINKS IT IS HIS BLOOD THINNER.  DIO'S ALLERGIES STARTED 03/08/22 AND HAS BEEN TAKING ASPIRIN SINCE 10/21, THE BLOOD THINNER JUST THE LAST 3 WEEKS. TESTED NO FOOD ALLERGIES AND MILD REACTION TO DUST AND MOSQUITOS.     How long have you had these concerns: 7 DAYS

## 2022-03-17 ENCOUNTER — TELEPHONE (OUTPATIENT)
Dept: CARDIOLOGY | Facility: CLINIC | Age: 47
End: 2022-03-17

## 2022-03-17 RX ORDER — PRASUGREL 10 MG/1
10 TABLET, FILM COATED ORAL DAILY
Qty: 90 TABLET | Refills: 3 | Status: SHIPPED | OUTPATIENT
Start: 2022-03-17 | End: 2022-04-16

## 2022-03-17 NOTE — TELEPHONE ENCOUNTER
Pts wife called stating that the Pt went and seen an allergist yesterday and was negative for the testing. Pts wife states that the allergist states that they think he is allergic to the ASA and the Pts family thinks its the plavix. Pt is taking mutliple allergy medications but is still having issues with fatigue and shaking. Please advise

## 2022-04-27 DIAGNOSIS — I10 BENIGN ESSENTIAL HYPERTENSION: ICD-10-CM

## 2022-04-27 RX ORDER — LOSARTAN POTASSIUM AND HYDROCHLOROTHIAZIDE 12.5; 5 MG/1; MG/1
1 TABLET ORAL DAILY
Qty: 30 TABLET | Refills: 5 | Status: SHIPPED | OUTPATIENT
Start: 2022-04-27 | End: 2022-10-11 | Stop reason: SDUPTHER

## 2022-04-27 NOTE — TELEPHONE ENCOUNTER
Rx Refill Note  Requested Prescriptions     Pending Prescriptions Disp Refills   • losartan-hydrochlorothiazide (Hyzaar) 50-12.5 MG per tablet 30 tablet 5     Sig: Take 1 tablet by mouth Daily.      Last office visit with prescribing clinician: 3/9/2022      Next office visit with prescribing clinician: 5/26/2022            PATI MURRAY MA  04/27/22, 14:35 EDT

## 2022-04-27 NOTE — TELEPHONE ENCOUNTER
Caller: Zachery Lomeli    Relationship: Self    Best call back number: [phone]    Requested Prescriptions:   Requested Prescriptions     Pending Prescriptions Disp Refills   • losartan-hydrochlorothiazide (Hyzaar) 50-12.5 MG per tablet 30 tablet 5     Sig: Take 1 tablet by mouth Daily.        Pharmacy where request should be sent:      [facility] [address] [place] [address] [phone] [facility] [phone]        Additional details provided by patient:     Does the patient have less than a 3 day supply:  [x] Yes  [] No    Danielle Campbell Rep   04/27/22 13:51 EDT

## 2022-05-20 LAB
ALBUMIN SERPL-MCNC: 4.3 G/DL (ref 3.5–5.2)
ALBUMIN/GLOB SERPL: 2 G/DL
ALP SERPL-CCNC: 69 U/L (ref 39–117)
ALT SERPL-CCNC: 19 U/L (ref 1–41)
AST SERPL-CCNC: 14 U/L (ref 1–40)
BASOPHILS # BLD AUTO: 0.05 10*3/MM3 (ref 0–0.2)
BASOPHILS NFR BLD AUTO: 0.7 % (ref 0–1.5)
BILIRUB SERPL-MCNC: 0.8 MG/DL (ref 0–1.2)
BUN SERPL-MCNC: 15 MG/DL (ref 6–20)
BUN/CREAT SERPL: 15.8 (ref 7–25)
CALCIUM SERPL-MCNC: 9.7 MG/DL (ref 8.6–10.5)
CHLORIDE SERPL-SCNC: 107 MMOL/L (ref 98–107)
CHOLEST SERPL-MCNC: 94 MG/DL (ref 0–200)
CO2 SERPL-SCNC: 26 MMOL/L (ref 22–29)
CREAT SERPL-MCNC: 0.95 MG/DL (ref 0.76–1.27)
EGFRCR SERPLBLD CKD-EPI 2021: 100 ML/MIN/1.73
EOSINOPHIL # BLD AUTO: 0.14 10*3/MM3 (ref 0–0.4)
EOSINOPHIL NFR BLD AUTO: 1.9 % (ref 0.3–6.2)
ERYTHROCYTE [DISTWIDTH] IN BLOOD BY AUTOMATED COUNT: 13 % (ref 12.3–15.4)
GLOBULIN SER CALC-MCNC: 2.2 GM/DL
GLUCOSE SERPL-MCNC: 108 MG/DL (ref 65–99)
HBA1C MFR BLD: 5.4 % (ref 4.8–5.6)
HCT VFR BLD AUTO: 44.1 % (ref 37.5–51)
HDLC SERPL-MCNC: 37 MG/DL (ref 40–60)
HGB BLD-MCNC: 14.8 G/DL (ref 13–17.7)
IMM GRANULOCYTES # BLD AUTO: 0.03 10*3/MM3 (ref 0–0.05)
IMM GRANULOCYTES NFR BLD AUTO: 0.4 % (ref 0–0.5)
LDLC SERPL CALC-MCNC: 44 MG/DL (ref 0–100)
LYMPHOCYTES # BLD AUTO: 1.97 10*3/MM3 (ref 0.7–3.1)
LYMPHOCYTES NFR BLD AUTO: 27 % (ref 19.6–45.3)
MCH RBC QN AUTO: 29.8 PG (ref 26.6–33)
MCHC RBC AUTO-ENTMCNC: 33.6 G/DL (ref 31.5–35.7)
MCV RBC AUTO: 88.7 FL (ref 79–97)
MONOCYTES # BLD AUTO: 0.5 10*3/MM3 (ref 0.1–0.9)
MONOCYTES NFR BLD AUTO: 6.9 % (ref 5–12)
NEUTROPHILS # BLD AUTO: 4.6 10*3/MM3 (ref 1.7–7)
NEUTROPHILS NFR BLD AUTO: 63.1 % (ref 42.7–76)
NRBC BLD AUTO-RTO: 0 /100 WBC (ref 0–0.2)
PLATELET # BLD AUTO: 246 10*3/MM3 (ref 140–450)
POTASSIUM SERPL-SCNC: 4.4 MMOL/L (ref 3.5–5.2)
PROT SERPL-MCNC: 6.5 G/DL (ref 6–8.5)
RBC # BLD AUTO: 4.97 10*6/MM3 (ref 4.14–5.8)
SODIUM SERPL-SCNC: 142 MMOL/L (ref 136–145)
TRIGL SERPL-MCNC: 56 MG/DL (ref 0–150)
VLDLC SERPL CALC-MCNC: 13 MG/DL (ref 5–40)
WBC # BLD AUTO: 7.29 10*3/MM3 (ref 3.4–10.8)

## 2022-05-26 ENCOUNTER — OFFICE VISIT (OUTPATIENT)
Dept: INTERNAL MEDICINE | Facility: CLINIC | Age: 47
End: 2022-05-26

## 2022-05-26 VITALS
DIASTOLIC BLOOD PRESSURE: 66 MMHG | TEMPERATURE: 97.8 F | WEIGHT: 210 LBS | HEIGHT: 67 IN | SYSTOLIC BLOOD PRESSURE: 112 MMHG | BODY MASS INDEX: 32.96 KG/M2 | HEART RATE: 54 BPM | OXYGEN SATURATION: 98 %

## 2022-05-26 DIAGNOSIS — R73.01 IMPAIRED FASTING GLUCOSE: ICD-10-CM

## 2022-05-26 DIAGNOSIS — E78.2 MIXED HYPERLIPIDEMIA: ICD-10-CM

## 2022-05-26 DIAGNOSIS — I25.10 CORONARY ARTERY DISEASE INVOLVING NATIVE CORONARY ARTERY OF NATIVE HEART WITHOUT ANGINA PECTORIS: ICD-10-CM

## 2022-05-26 DIAGNOSIS — I10 BENIGN ESSENTIAL HYPERTENSION: Primary | ICD-10-CM

## 2022-05-26 PROCEDURE — 99214 OFFICE O/P EST MOD 30 MIN: CPT | Performed by: INTERNAL MEDICINE

## 2022-05-26 RX ORDER — PRASUGREL 10 MG/1
10 TABLET, FILM COATED ORAL DAILY
COMMUNITY
End: 2023-02-06

## 2022-05-26 RX ORDER — LORATADINE 10 MG/1
10 TABLET ORAL DAILY
COMMUNITY
Start: 2022-03-15 | End: 2022-10-11

## 2022-05-26 NOTE — PROGRESS NOTES
"Chief Complaint  Hypertension and Hyperlipidemia    Subjective          Zachery Lomeli presents to [facility]  History of Present Illness  HPI: Patient is here to follow up on the blood pressure  The patient is taking the blood pressure medications as prescribed and has had no side effects. The patient is also here to follow up on the cholesterol and is trying to follow a diet. The patient  Had  lab work done .  The patient also needs refills on medications .  Patient is also to follow-up on coronary artery disease, he had a rash which is noted by the allergist to be an allergic reaction to aspirin which was discontinued and he saw cardiology and is currently on Effient 10 mg daily  Hyperlipidemia   Pertinent negatives include no chest pain or shortness of breath.   Hypertension   Pertinent negatives include no chest pain, palpitations or shortness of breath.    Objective   Vital Signs:  /66   Pulse 54   Temp 97.8 °F (36.6 °C)   Ht 170.2 cm (67\")   Wt 95.3 kg (210 lb)   SpO2 98%   BMI 32.89 kg/m²   BMI is >= 30 and <= 34.9 (Class 1 obesity). The following options were offered after discussion: weight loss educational material (shared in after visit summary), exercise counseling/recommendations and nutrition counseling/recommendations      Physical Exam  Vitals and nursing note reviewed.   Constitutional:       General: He is not in acute distress.     Appearance: Normal appearance. He is not diaphoretic.   HENT:      Head: Normocephalic and atraumatic.      Right Ear: External ear normal.      Left Ear: External ear normal.      Nose: Nose normal.   Eyes:      Extraocular Movements: Extraocular movements intact.      Conjunctiva/sclera: Conjunctivae normal.   Neck:      Trachea: Trachea normal.   Cardiovascular:      Rate and Rhythm: Normal rate and regular rhythm.      Heart sounds: Normal heart sounds.   Pulmonary:      Effort: Pulmonary effort is normal. No respiratory " distress.   Abdominal:      General: Abdomen is flat.   Musculoskeletal:      Cervical back: Neck supple.      Comments: Moves all limbs   Skin:     General: Skin is warm and dry.      Findings: No erythema.   Neurological:      Mental Status: He is alert and oriented to person, place, and time.      Comments: No gross motor or sensory deficits        Result Review :     Common labs    Common Labsle 12/17/21 12/17/21 12/17/21 12/17/21 3/12/22 3/12/22 5/20/22 5/20/22 5/20/22 5/20/22    0811 0811 0811 0811 0915 0915 0822 0822 0822 0822   Glucose  106 (A)    218 (A)  108 (A)     BUN  12    25 (A)  15     Creatinine  0.95    0.94  0.95     eGFR Non African Am  85           eGFR  Am  103           Sodium  143    139  142     Potassium  4.5    4.1  4.4     Chloride  106    102  107     Calcium  9.7    8.9  9.7     Total Protein  6.6      6.5     Albumin  4.60    3.70  4.30     Total Bilirubin  0.7    0.5  0.8     Alkaline Phosphatase  85    53  69     AST (SGOT)  15    12  14     ALT (SGPT)  24    15  19     WBC 6.36    18.13 (A)  7.29      Hemoglobin 14.6    13.6  14.8      Hematocrit 44.4    38.6  44.1      Platelets 246    269  246      Total Cholesterol   95      94    Triglycerides   59      56    HDL Cholesterol   36 (A)      37 (A)    LDL Cholesterol    45      44    Hemoglobin A1C    5.19      5.40   (A) Abnormal value       Comments are available for some flowsheets but are not being displayed.                     Assessment and Plan    Diagnoses and all orders for this visit:    1. Benign essential hypertension (Primary)    2. Mixed hyperlipidemia  -     CBC & Differential  -     Comprehensive Metabolic Panel  -     Lipid Panel    3. Impaired fasting glucose  -     Hemoglobin A1c    4. Coronary artery disease involving native coronary artery of native heart without angina pectoris    Plan:  1.  Benign essential hypertension: Will continue current medication, low-sodium diet advised, Counseled to regularly  check BP at home with goal averaging <130/80.   2.mixed hyperlipidemia:  reviewed  fasting CMP and lipid panel.  Diet and exercise counseled,  Will continue current medications  3. impaired glucose   :   reviewed  fasting CMP  and hba1c 5.4, diet and exercise counseled  4.  CAD: To continue Effient, per cardiology, labs reviewed         I spent 30 minutes caring for Zachery on this date of service. This time includes time spent by me in the following activities:preparing for the visit, reviewing tests, performing a medically appropriate examination and/or evaluation , counseling and educating the patient/family/caregiver, ordering medications, tests, or procedures and documenting information in the medical record  Follow Up   Return in about 16 weeks (around 9/15/2022).  Patient was given instructions and counseling regarding his condition or for health maintenance advice. Please see specific information pulled into the AVS if appropriate.

## 2022-10-07 LAB
ALBUMIN SERPL-MCNC: 4.6 G/DL (ref 3.5–5.2)
ALBUMIN/GLOB SERPL: 3.1 G/DL
ALP SERPL-CCNC: 76 U/L (ref 39–117)
ALT SERPL-CCNC: 25 U/L (ref 1–41)
AST SERPL-CCNC: 19 U/L (ref 1–40)
BASOPHILS # BLD AUTO: 0.05 10*3/MM3 (ref 0–0.2)
BASOPHILS NFR BLD AUTO: 0.8 % (ref 0–1.5)
BILIRUB SERPL-MCNC: 0.9 MG/DL (ref 0–1.2)
BUN SERPL-MCNC: 13 MG/DL (ref 6–20)
BUN/CREAT SERPL: 14.6 (ref 7–25)
CALCIUM SERPL-MCNC: 9.8 MG/DL (ref 8.6–10.5)
CHLORIDE SERPL-SCNC: 106 MMOL/L (ref 98–107)
CHOLEST SERPL-MCNC: 118 MG/DL (ref 0–200)
CO2 SERPL-SCNC: 29.3 MMOL/L (ref 22–29)
CREAT SERPL-MCNC: 0.89 MG/DL (ref 0.76–1.27)
EGFRCR SERPLBLD CKD-EPI 2021: 106.4 ML/MIN/1.73
EOSINOPHIL # BLD AUTO: 0.16 10*3/MM3 (ref 0–0.4)
EOSINOPHIL NFR BLD AUTO: 2.5 % (ref 0.3–6.2)
ERYTHROCYTE [DISTWIDTH] IN BLOOD BY AUTOMATED COUNT: 12.8 % (ref 12.3–15.4)
GLOBULIN SER CALC-MCNC: 1.5 GM/DL
GLUCOSE SERPL-MCNC: 107 MG/DL (ref 65–99)
HBA1C MFR BLD: 5.4 % (ref 4.8–5.6)
HCT VFR BLD AUTO: 42 % (ref 37.5–51)
HDLC SERPL-MCNC: 51 MG/DL (ref 40–60)
HGB BLD-MCNC: 14.2 G/DL (ref 13–17.7)
IMM GRANULOCYTES # BLD AUTO: 0.03 10*3/MM3 (ref 0–0.05)
IMM GRANULOCYTES NFR BLD AUTO: 0.5 % (ref 0–0.5)
LDLC SERPL CALC-MCNC: 54 MG/DL (ref 0–100)
LYMPHOCYTES # BLD AUTO: 1.78 10*3/MM3 (ref 0.7–3.1)
LYMPHOCYTES NFR BLD AUTO: 28.3 % (ref 19.6–45.3)
MCH RBC QN AUTO: 29.4 PG (ref 26.6–33)
MCHC RBC AUTO-ENTMCNC: 33.8 G/DL (ref 31.5–35.7)
MCV RBC AUTO: 87 FL (ref 79–97)
MONOCYTES # BLD AUTO: 0.4 10*3/MM3 (ref 0.1–0.9)
MONOCYTES NFR BLD AUTO: 6.4 % (ref 5–12)
NEUTROPHILS # BLD AUTO: 3.87 10*3/MM3 (ref 1.7–7)
NEUTROPHILS NFR BLD AUTO: 61.5 % (ref 42.7–76)
NRBC BLD AUTO-RTO: 0 /100 WBC (ref 0–0.2)
PLATELET # BLD AUTO: 226 10*3/MM3 (ref 140–450)
POTASSIUM SERPL-SCNC: 4.2 MMOL/L (ref 3.5–5.2)
PROT SERPL-MCNC: 6.1 G/DL (ref 6–8.5)
RBC # BLD AUTO: 4.83 10*6/MM3 (ref 4.14–5.8)
SODIUM SERPL-SCNC: 143 MMOL/L (ref 136–145)
TRIGL SERPL-MCNC: 60 MG/DL (ref 0–150)
VLDLC SERPL CALC-MCNC: 13 MG/DL (ref 5–40)
WBC # BLD AUTO: 6.29 10*3/MM3 (ref 3.4–10.8)

## 2022-10-11 ENCOUNTER — OFFICE VISIT (OUTPATIENT)
Dept: INTERNAL MEDICINE | Facility: CLINIC | Age: 47
End: 2022-10-11

## 2022-10-11 VITALS
SYSTOLIC BLOOD PRESSURE: 110 MMHG | DIASTOLIC BLOOD PRESSURE: 60 MMHG | WEIGHT: 210 LBS | OXYGEN SATURATION: 98 % | BODY MASS INDEX: 32.96 KG/M2 | TEMPERATURE: 98.2 F | HEIGHT: 67 IN | HEART RATE: 59 BPM

## 2022-10-11 DIAGNOSIS — R73.01 IMPAIRED FASTING GLUCOSE: ICD-10-CM

## 2022-10-11 DIAGNOSIS — E78.2 MIXED HYPERLIPIDEMIA: ICD-10-CM

## 2022-10-11 DIAGNOSIS — I25.10 CORONARY ARTERY DISEASE INVOLVING NATIVE CORONARY ARTERY OF NATIVE HEART WITHOUT ANGINA PECTORIS: ICD-10-CM

## 2022-10-11 DIAGNOSIS — Z23 NEED FOR INFLUENZA VACCINATION: ICD-10-CM

## 2022-10-11 DIAGNOSIS — I10 BENIGN ESSENTIAL HYPERTENSION: Primary | ICD-10-CM

## 2022-10-11 PROCEDURE — 90686 IIV4 VACC NO PRSV 0.5 ML IM: CPT | Performed by: INTERNAL MEDICINE

## 2022-10-11 PROCEDURE — 99214 OFFICE O/P EST MOD 30 MIN: CPT | Performed by: INTERNAL MEDICINE

## 2022-10-11 PROCEDURE — 90471 IMMUNIZATION ADMIN: CPT | Performed by: INTERNAL MEDICINE

## 2022-10-11 RX ORDER — METOPROLOL SUCCINATE 50 MG/1
50 TABLET, EXTENDED RELEASE ORAL DAILY
Qty: 90 TABLET | Refills: 2 | Status: SHIPPED | OUTPATIENT
Start: 2022-10-11 | End: 2023-02-28 | Stop reason: SDUPTHER

## 2022-10-11 RX ORDER — LOSARTAN POTASSIUM AND HYDROCHLOROTHIAZIDE 12.5; 5 MG/1; MG/1
1 TABLET ORAL DAILY
Qty: 90 TABLET | Refills: 2 | Status: SHIPPED | OUTPATIENT
Start: 2022-10-11 | End: 2023-02-28 | Stop reason: SDUPTHER

## 2022-10-11 RX ORDER — ATORVASTATIN CALCIUM 80 MG/1
80 TABLET, FILM COATED ORAL NIGHTLY
Qty: 90 TABLET | Refills: 2 | Status: SHIPPED | OUTPATIENT
Start: 2022-10-11 | End: 2022-12-10 | Stop reason: SDUPTHER

## 2022-10-11 NOTE — PROGRESS NOTES
"Chief Complaint  Hypertension and Hyperlipidemia    Subjective        Zachery Lomeli presents to [facility]  History of Present Illness  HPI: Patient is here to follow up on the blood pressure  The patient is taking the blood pressure medications as prescribed and has had no side effects. The patient is also here to follow up on the cholesterol and is trying to follow a diet. The patient had  lab work done .  The patient also is following up on CAD and saw cardiology and needs refills on medications and flu shot .   Hyperlipidemia   Pertinent negatives include no chest pain or shortness of breath.   Hypertension   Pertinent negatives include no chest pain, palpitations or shortness of breath.    Objective   Vital Signs:  /60   Pulse 59   Temp 98.2 °F (36.8 °C) (Infrared)   Ht 170.2 cm (67\")   Wt 95.3 kg (210 lb)   SpO2 98%   BMI 32.89 kg/m²   Estimated body mass index is 32.89 kg/m² as calculated from the following:    Height as of this encounter: 170.2 cm (67\").    Weight as of this encounter: 95.3 kg (210 lb).    BMI is >= 30 and <35. (Class 1 Obesity). The following options were offered after discussion;: weight loss educational material (shared in after visit summary), exercise counseling/recommendations and nutrition counseling/recommendations      Physical Exam  Vitals and nursing note reviewed.   Constitutional:       General: He is not in acute distress.     Appearance: Normal appearance. He is not diaphoretic.   HENT:      Head: Normocephalic and atraumatic.      Right Ear: External ear normal.      Left Ear: External ear normal.      Nose: Nose normal.   Eyes:      Extraocular Movements: Extraocular movements intact.      Conjunctiva/sclera: Conjunctivae normal.   Neck:      Trachea: Trachea normal.   Cardiovascular:      Rate and Rhythm: Normal rate and regular rhythm.      Heart sounds: Normal heart sounds.   Pulmonary:      Effort: Pulmonary effort is normal. No " respiratory distress.   Abdominal:      General: Abdomen is flat.   Musculoskeletal:      Cervical back: Neck supple.      Comments: Moves all limbs   Skin:     General: Skin is warm and dry.      Findings: No erythema.   Neurological:      Mental Status: He is alert and oriented to person, place, and time.      Comments: No gross motor or sensory deficits        Result Review :    Common labs    Common Labs 3/12/22 3/12/22 5/20/22 5/20/22 5/20/22 5/20/22 10/7/22 10/7/22 10/7/22 10/7/22    0915 0915 0822 0822 0822 0822 0810 0810 0810 0810   Glucose  218 (A)  108 (A)    107 (A)     BUN  25 (A)  15    13     Creatinine  0.94  0.95    0.89     Sodium  139  142    143     Potassium  4.1  4.4    4.2     Chloride  102  107    106     Calcium  8.9  9.7    9.8     Total Protein    6.5    6.1     Albumin  3.70  4.30    4.60     Total Bilirubin  0.5  0.8    0.9     Alkaline Phosphatase  53  69    76     AST (SGOT)  12  14    19     ALT (SGPT)  15  19    25     WBC 18.13 (A)  7.29    6.29      Hemoglobin 13.6  14.8    14.2      Hematocrit 38.6  44.1    42.0      Platelets 269  246    226      Total Cholesterol     94    118    Triglycerides     56    60    HDL Cholesterol     37 (A)    51    LDL Cholesterol      44    54    Hemoglobin A1C      5.40    5.40   (A) Abnormal value       Comments are available for some flowsheets but are not being displayed.                     Assessment and Plan   Diagnoses and all orders for this visit:    1. Benign essential hypertension (Primary)  -     losartan-hydrochlorothiazide (Hyzaar) 50-12.5 MG per tablet; Take 1 tablet by mouth Daily.  Dispense: 90 tablet; Refill: 2    2. Mixed hyperlipidemia  -     CBC & Differential  -     Comprehensive Metabolic Panel  -     Lipid Panel  -     atorvastatin (LIPITOR) 80 MG tablet; Take 1 tablet by mouth Every Night.  Dispense: 90 tablet; Refill: 2    3. Impaired fasting glucose  -     Hemoglobin A1c    4. Coronary artery disease involving native  coronary artery of native heart without angina pectoris  -     metoprolol succinate XL (TOPROL-XL) 50 MG 24 hr tablet; Take 1 tablet by mouth Daily.  Dispense: 90 tablet; Refill: 2    5. Need for influenza vaccination    Other orders  -     FluLaval/Fluzone >6 mos ([id number])    Plan:  1.  Benign essential hypertension: Will continue current medication, low-sodium diet advised, Counseled to regularly check BP at home with goal averaging <130/80.   2.mixed hyperlipidemia:  reviewed  fasting CMP and lipid panel.  Diet and exercise counseled,  Will continue current medications  3. impaired glucose   :   reviewed  fasting CMP  and hba1c5.4  , diet and exercise counseled  4. CAD: Refill medications  5.Need for flu vaccine : given today and well tolerated       I spent 30 minutes caring for Zachery on this date of service. This time includes time spent by me in the following activities:preparing for the visit, reviewing tests, performing a medically appropriate examination and/or evaluation , counseling and educating the patient/family/caregiver, ordering medications, tests, or procedures and documenting information in the medical record  Follow Up   Return in about 4 months (around 2/6/2023).  Patient was given instructions and counseling regarding his condition or for health maintenance advice. Please see specific information pulled into the AVS if appropriate.

## 2022-12-10 DIAGNOSIS — E78.2 MIXED HYPERLIPIDEMIA: ICD-10-CM

## 2022-12-12 RX ORDER — ATORVASTATIN CALCIUM 80 MG/1
80 TABLET, FILM COATED ORAL NIGHTLY
Qty: 90 TABLET | Refills: 2 | Status: SHIPPED | OUTPATIENT
Start: 2022-12-12

## 2022-12-12 NOTE — TELEPHONE ENCOUNTER
Rx Refill Note  Requested Prescriptions     Pending Prescriptions Disp Refills   • atorvastatin (LIPITOR) 80 MG tablet 90 tablet 2     Sig: Take 1 tablet by mouth Every Night.      Last office visit with prescribing clinician: 10/11/2022   Last telemedicine visit with prescribing clinician: 2/22/2023   Next office visit with prescribing clinician: 2/22/2023                         Would you like a call back once the refill request has been completed: [] Yes [] No    If the office needs to give you a call back, can they leave a voicemail: [] Yes [] No    Justin Calvin CMA  12/12/22, 11:06 EST

## 2023-02-06 ENCOUNTER — OFFICE VISIT (OUTPATIENT)
Dept: INTERNAL MEDICINE | Facility: CLINIC | Age: 48
End: 2023-02-06
Payer: COMMERCIAL

## 2023-02-06 ENCOUNTER — LAB (OUTPATIENT)
Dept: LAB | Facility: HOSPITAL | Age: 48
End: 2023-02-06
Payer: COMMERCIAL

## 2023-02-06 VITALS
TEMPERATURE: 98.1 F | SYSTOLIC BLOOD PRESSURE: 108 MMHG | RESPIRATION RATE: 16 BRPM | WEIGHT: 213 LBS | HEIGHT: 67 IN | HEART RATE: 75 BPM | DIASTOLIC BLOOD PRESSURE: 66 MMHG | BODY MASS INDEX: 33.43 KG/M2 | OXYGEN SATURATION: 98 %

## 2023-02-06 DIAGNOSIS — I25.10 CORONARY ARTERY DISEASE INVOLVING NATIVE CORONARY ARTERY OF NATIVE HEART WITHOUT ANGINA PECTORIS: ICD-10-CM

## 2023-02-06 DIAGNOSIS — R10.11 RIGHT UPPER QUADRANT ABDOMINAL PAIN: Primary | ICD-10-CM

## 2023-02-06 DIAGNOSIS — G47.33 OSA (OBSTRUCTIVE SLEEP APNEA): ICD-10-CM

## 2023-02-06 PROBLEM — E11.9 CONTROLLED TYPE 2 DIABETES MELLITUS WITHOUT COMPLICATION, WITHOUT LONG-TERM CURRENT USE OF INSULIN: Status: RESOLVED | Noted: 2023-02-06 | Resolved: 2023-02-06

## 2023-02-06 PROBLEM — E11.9 CONTROLLED TYPE 2 DIABETES MELLITUS WITHOUT COMPLICATION, WITHOUT LONG-TERM CURRENT USE OF INSULIN: Status: ACTIVE | Noted: 2023-02-06

## 2023-02-06 PROBLEM — E66.811 OBESITY (BMI 30.0-34.9): Status: ACTIVE | Noted: 2021-11-04

## 2023-02-06 PROBLEM — E66.9 OBESITY (BMI 30.0-34.9): Status: ACTIVE | Noted: 2021-11-04

## 2023-02-06 LAB
ALBUMIN SERPL-MCNC: 4.6 G/DL (ref 3.5–5.2)
ALBUMIN/GLOB SERPL: 1.8 G/DL
ALP SERPL-CCNC: 101 U/L (ref 39–117)
ALT SERPL W P-5'-P-CCNC: 23 U/L (ref 1–41)
ANION GAP SERPL CALCULATED.3IONS-SCNC: 11 MMOL/L (ref 5–15)
AST SERPL-CCNC: 19 U/L (ref 1–40)
BASOPHILS # BLD AUTO: 0.04 10*3/MM3 (ref 0–0.2)
BASOPHILS NFR BLD AUTO: 0.5 % (ref 0–1.5)
BILIRUB SERPL-MCNC: 1.1 MG/DL (ref 0–1.2)
BUN SERPL-MCNC: 11 MG/DL (ref 6–20)
BUN/CREAT SERPL: 12.2 (ref 7–25)
CALCIUM SPEC-SCNC: 9.8 MG/DL (ref 8.6–10.5)
CHLORIDE SERPL-SCNC: 100 MMOL/L (ref 98–107)
CO2 SERPL-SCNC: 24 MMOL/L (ref 22–29)
CREAT SERPL-MCNC: 0.9 MG/DL (ref 0.76–1.27)
D DIMER PPP FEU-MCNC: <0.27 MCGFEU/ML (ref 0–0.5)
DEPRECATED RDW RBC AUTO: 35 FL (ref 37–54)
EGFRCR SERPLBLD CKD-EPI 2021: 106 ML/MIN/1.73
EOSINOPHIL # BLD AUTO: 0.1 10*3/MM3 (ref 0–0.4)
EOSINOPHIL NFR BLD AUTO: 1.2 % (ref 0.3–6.2)
ERYTHROCYTE [DISTWIDTH] IN BLOOD BY AUTOMATED COUNT: 11.9 % (ref 12.3–15.4)
GLOBULIN UR ELPH-MCNC: 2.6 GM/DL
GLUCOSE SERPL-MCNC: 100 MG/DL (ref 65–99)
HCT VFR BLD AUTO: 43 % (ref 37.5–51)
HGB BLD-MCNC: 15.7 G/DL (ref 13–17.7)
IMM GRANULOCYTES # BLD AUTO: 0.03 10*3/MM3 (ref 0–0.05)
IMM GRANULOCYTES NFR BLD AUTO: 0.4 % (ref 0–0.5)
LYMPHOCYTES # BLD AUTO: 1.99 10*3/MM3 (ref 0.7–3.1)
LYMPHOCYTES NFR BLD AUTO: 24.8 % (ref 19.6–45.3)
MCH RBC QN AUTO: 29.8 PG (ref 26.6–33)
MCHC RBC AUTO-ENTMCNC: 36.5 G/DL (ref 31.5–35.7)
MCV RBC AUTO: 81.6 FL (ref 79–97)
MONOCYTES # BLD AUTO: 0.59 10*3/MM3 (ref 0.1–0.9)
MONOCYTES NFR BLD AUTO: 7.3 % (ref 5–12)
NEUTROPHILS NFR BLD AUTO: 5.28 10*3/MM3 (ref 1.7–7)
NEUTROPHILS NFR BLD AUTO: 65.8 % (ref 42.7–76)
NRBC BLD AUTO-RTO: 0 /100 WBC (ref 0–0.2)
PLATELET # BLD AUTO: 254 10*3/MM3 (ref 140–450)
PMV BLD AUTO: 9.7 FL (ref 6–12)
POTASSIUM SERPL-SCNC: 3.8 MMOL/L (ref 3.5–5.2)
PROT SERPL-MCNC: 7.2 G/DL (ref 6–8.5)
RBC # BLD AUTO: 5.27 10*6/MM3 (ref 4.14–5.8)
SODIUM SERPL-SCNC: 135 MMOL/L (ref 136–145)
TROPONIN T SERPL-MCNC: <0.01 NG/ML (ref 0–0.03)
WBC NRBC COR # BLD: 8.03 10*3/MM3 (ref 3.4–10.8)

## 2023-02-06 PROCEDURE — 99214 OFFICE O/P EST MOD 30 MIN: CPT | Performed by: INTERNAL MEDICINE

## 2023-02-06 PROCEDURE — 84484 ASSAY OF TROPONIN QUANT: CPT | Performed by: INTERNAL MEDICINE

## 2023-02-06 PROCEDURE — 36415 COLL VENOUS BLD VENIPUNCTURE: CPT | Performed by: INTERNAL MEDICINE

## 2023-02-06 PROCEDURE — 85379 FIBRIN DEGRADATION QUANT: CPT | Performed by: INTERNAL MEDICINE

## 2023-02-06 PROCEDURE — 86677 HELICOBACTER PYLORI ANTIBODY: CPT | Performed by: INTERNAL MEDICINE

## 2023-02-06 PROCEDURE — 93000 ELECTROCARDIOGRAM COMPLETE: CPT | Performed by: INTERNAL MEDICINE

## 2023-02-06 PROCEDURE — 85025 COMPLETE CBC W/AUTO DIFF WBC: CPT | Performed by: INTERNAL MEDICINE

## 2023-02-06 PROCEDURE — 80053 COMPREHEN METABOLIC PANEL: CPT | Performed by: INTERNAL MEDICINE

## 2023-02-06 RX ORDER — FAMOTIDINE 20 MG/1
20 TABLET, FILM COATED ORAL 2 TIMES DAILY PRN
Qty: 60 TABLET | Refills: 3 | Status: SHIPPED | OUTPATIENT
Start: 2023-02-06

## 2023-02-06 RX ORDER — PRASUGREL 10 MG/1
10 TABLET, FILM COATED ORAL DAILY
COMMUNITY
End: 2023-02-07

## 2023-02-06 NOTE — PROGRESS NOTES
"Subjective     Patient ID: Zachery Lomeli is a 47 y.o. male. Patient is here for management of multiple medical problems.     Chief Complaint   Patient presents with   • Abdominal Pain     Upper right quadrant     History of Present Illness     Ab pain.   Since last Tuesday.  Waxes and wanes.    occures 9:00. Supper at 5.  tusday after lunch.     No otc. No extra stress.     Last night 5 carrot stick.     Tenderloin set off pain.  Not taking his pepcid.    Hx of cad  Stent.         The following portions of the patient's history were reviewed and updated as appropriate: allergies, current medications, past family history, past medical history, past social history, past surgical history and problem list.    Review of Systems    Current Outpatient Medications:   •  atorvastatin (LIPITOR) 80 MG tablet, Take 1 tablet by mouth Every Night., Disp: 90 tablet, Rfl: 2  •  Cholecalciferol (VITAMIN D-3) 1000 UNITS capsule, Take 1,000 Units by mouth Daily., Disp: , Rfl:   •  diphenhydrAMINE (BENADRYL) 25 MG tablet, Take 1 tablet by mouth Every 6 (Six) Hours As Needed for Itching., Disp: 30 tablet, Rfl: 0  •  famotidine (PEPCID) 20 MG tablet, Take 1 tablet by mouth 2 (Two) Times a Day As Needed for Indigestion., Disp: 60 tablet, Rfl: 3  •  losartan-hydrochlorothiazide (Hyzaar) 50-12.5 MG per tablet, Take 1 tablet by mouth Daily., Disp: 90 tablet, Rfl: 2  •  metoprolol succinate XL (TOPROL-XL) 50 MG 24 hr tablet, Take 1 tablet by mouth Daily., Disp: 90 tablet, Rfl: 2  •  prasugrel (EFFIENT) 10 MG tablet, Take 10 mg by mouth Daily., Disp: , Rfl:   •  vitamin B-12 (CYANOCOBALAMIN) 1000 MCG tablet, Take 1,000 mcg by mouth Daily., Disp: , Rfl:     Objective      Blood pressure 108/66, pulse 75, temperature 98.1 °F (36.7 °C), resp. rate 16, height 170.2 cm (67\"), weight 96.6 kg (213 lb), SpO2 98 %.    Physical Exam     General Appearance:    Alert, cooperative, no distress, appears stated age   Head:    Normocephalic, without obvious " abnormality, atraumatic   Eyes:    PERRL, conjunctiva/corneas clear, EOM's intact   Ears:    Normal TM's and external ear canals, both ears   Nose:   Nares normal, septum midline, mucosa normal, no drainage   or sinus tenderness   Throat:   Lips, mucosa, and tongue normal; teeth and gums normal   Neck:   Supple, symmetrical, trachea midline, no adenopathy;        thyroid:  No enlargement/tenderness/nodules; no carotid    bruit or JVD   Back:     Symmetric, no curvature, ROM normal, no CVA tenderness   Lungs:     Clear to auscultation bilaterally, respirations unlabored   Chest wall:    No tenderness or deformity   Heart:    Regular rate and rhythm, S1 and S2 normal, no murmur,        rub or gallop   Abdomen:     Soft, non-tender, bowel sounds active all four quadrants,     no masses, no organomegaly   Extremities:   Extremities normal, atraumatic, no cyanosis or edema   Pulses:   2+ and symmetric all extremities   Skin:   Skin color, texture, turgor normal, no rashes or lesions   Lymph nodes:   Cervical, supraclavicular, and axillary nodes normal   Neurologic:   CNII-XII intact. Normal strength, sensation and reflexes       throughout      Results for orders placed or performed in visit on 05/26/22   Comprehensive Metabolic Panel    Specimen: Blood   Result Value Ref Range    Glucose 107 (H) 65 - 99 mg/dL    BUN 13 6 - 20 mg/dL    Creatinine 0.89 0.76 - 1.27 mg/dL    EGFR Result 106.4 >60.0 mL/min/1.73    BUN/Creatinine Ratio 14.6 7.0 - 25.0    Sodium 143 136 - 145 mmol/L    Potassium 4.2 3.5 - 5.2 mmol/L    Chloride 106 98 - 107 mmol/L    Total CO2 29.3 (H) 22.0 - 29.0 mmol/L    Calcium 9.8 8.6 - 10.5 mg/dL    Total Protein 6.1 6.0 - 8.5 g/dL    Albumin 4.60 3.50 - 5.20 g/dL    Globulin 1.5 gm/dL    A/G Ratio 3.1 g/dL    Total Bilirubin 0.9 0.0 - 1.2 mg/dL    Alkaline Phosphatase 76 39 - 117 U/L    AST (SGOT) 19 1 - 40 U/L    ALT (SGPT) 25 1 - 41 U/L   Lipid Panel    Specimen: Blood   Result Value Ref Range     Total Cholesterol 118 0 - 200 mg/dL    Triglycerides 60 0 - 150 mg/dL    HDL Cholesterol 51 40 - 60 mg/dL    VLDL Cholesterol You 13 5 - 40 mg/dL    LDL Chol Calc (NIH) 54 0 - 100 mg/dL   Hemoglobin A1c    Specimen: Blood   Result Value Ref Range    Hemoglobin A1C 5.40 4.80 - 5.60 %   CBC & Differential    Specimen: Blood   Result Value Ref Range    WBC 6.29 3.40 - 10.80 10*3/mm3    RBC 4.83 4.14 - 5.80 10*6/mm3    Hemoglobin 14.2 13.0 - 17.7 g/dL    Hematocrit 42.0 37.5 - 51.0 %    MCV 87.0 79.0 - 97.0 fL    MCH 29.4 26.6 - 33.0 pg    MCHC 33.8 31.5 - 35.7 g/dL    RDW 12.8 12.3 - 15.4 %    Platelets 226 140 - 450 10*3/mm3    Neutrophil Rel % 61.5 42.7 - 76.0 %    Lymphocyte Rel % 28.3 19.6 - 45.3 %    Monocyte Rel % 6.4 5.0 - 12.0 %    Eosinophil Rel % 2.5 0.3 - 6.2 %    Basophil Rel % 0.8 0.0 - 1.5 %    Neutrophils Absolute 3.87 1.70 - 7.00 10*3/mm3    Lymphocytes Absolute 1.78 0.70 - 3.10 10*3/mm3    Monocytes Absolute 0.40 0.10 - 0.90 10*3/mm3    Eosinophils Absolute 0.16 0.00 - 0.40 10*3/mm3    Basophils Absolute 0.05 0.00 - 0.20 10*3/mm3    Immature Granulocyte Rel % 0.5 0.0 - 0.5 %    Immature Grans Absolute 0.03 0.00 - 0.05 10*3/mm3    nRBC 0.0 0.0 - 0.2 /100 WBC         Assessment & Plan   Right upper ab pain.  Seem GB related. Unfortunately pt with strong hx of cad and stent.      Pain is waking him up at 1 am.   Pt had attempt as satinder work up on 10/2021. Never did the test.    MI oct 2022.     Last of 3 covid vaccine 11/2021.         Patient has been erroneously marked as diabetic. Based on the available clinical information, he does not have diabetes and should therefore be excluded from diabetic health maintenance and quality measures for the remainder of the reporting period.        ECG 12 Lead    Date/Time: 2/8/2023 1:18 PM  Performed by: Dean Wang MD  Authorized by: Dean Wang MD   Comparison: not compared with previous ECG   Rhythm: sinus rhythm  Conduction: conduction normal  Other:  no other findings    Clinical impression: normal ECG            Diagnoses and all orders for this visit:    1. Right upper quadrant abdominal pain (Primary)  -     US Gallbladder  -     Comprehensive Metabolic Panel  -     Troponin  -     D-dimer, Quantitative  -     Helicobacter Pylori, IgA IgG IgM  -     CBC & Differential    2. Coronary artery disease involving native coronary artery of native heart without angina pectoris  -     US Gallbladder  -     Comprehensive Metabolic Panel  -     Troponin  -     D-dimer, Quantitative  -     Helicobacter Pylori, IgA IgG IgM  -     CBC & Differential  -     Ambulatory Referral to Cardiology  -     Home Sleep Study    3. JED (obstructive sleep apnea)  -     Home Sleep Study    Other orders  -     famotidine (PEPCID) 20 MG tablet; Take 1 tablet by mouth 2 (Two) Times a Day As Needed for Indigestion.  Dispense: 60 tablet; Refill: 3      Return in about 3 days (around 2/9/2023).          There are no Patient Instructions on file for this visit.     Dean Wang MD    Assessment & Plan

## 2023-02-07 ENCOUNTER — OFFICE VISIT (OUTPATIENT)
Dept: CARDIOLOGY | Facility: CLINIC | Age: 48
End: 2023-02-07
Payer: COMMERCIAL

## 2023-02-07 ENCOUNTER — HOSPITAL ENCOUNTER (OUTPATIENT)
Dept: ULTRASOUND IMAGING | Facility: HOSPITAL | Age: 48
Discharge: HOME | End: 2023-02-07
Admitting: INTERNAL MEDICINE
Payer: COMMERCIAL

## 2023-02-07 VITALS
BODY MASS INDEX: 33.27 KG/M2 | HEIGHT: 67 IN | DIASTOLIC BLOOD PRESSURE: 78 MMHG | WEIGHT: 212 LBS | OXYGEN SATURATION: 98 % | HEART RATE: 71 BPM | SYSTOLIC BLOOD PRESSURE: 120 MMHG

## 2023-02-07 DIAGNOSIS — I35.8 AORTIC VALVE SCLEROSIS: ICD-10-CM

## 2023-02-07 DIAGNOSIS — Z01.810 PRE-OPERATIVE CARDIOVASCULAR EXAMINATION: ICD-10-CM

## 2023-02-07 DIAGNOSIS — I10 BENIGN ESSENTIAL HYPERTENSION: ICD-10-CM

## 2023-02-07 DIAGNOSIS — I25.10 CORONARY ARTERY DISEASE INVOLVING NATIVE CORONARY ARTERY OF NATIVE HEART WITHOUT ANGINA PECTORIS: Primary | ICD-10-CM

## 2023-02-07 DIAGNOSIS — E66.2 CLASS 1 OBESITY WITH ALVEOLAR HYPOVENTILATION WITHOUT SERIOUS COMORBIDITY WITH BODY MASS INDEX (BMI) OF 33.0 TO 33.9 IN ADULT: ICD-10-CM

## 2023-02-07 DIAGNOSIS — E78.5 DYSLIPIDEMIA: ICD-10-CM

## 2023-02-07 LAB
H PYLORI IGA SER-ACNC: <9 UNITS (ref 0–8.9)
H PYLORI IGG SER IA-ACNC: 0.22 INDEX VALUE (ref 0–0.79)
H PYLORI IGM SER-ACNC: <9 UNITS (ref 0–8.9)

## 2023-02-07 PROCEDURE — 76705 ECHO EXAM OF ABDOMEN: CPT

## 2023-02-07 PROCEDURE — 99213 OFFICE O/P EST LOW 20 MIN: CPT | Performed by: INTERNAL MEDICINE

## 2023-02-07 RX ORDER — PRASUGREL 10 MG/1
10 TABLET, FILM COATED ORAL DAILY
Qty: 90 TABLET | Refills: 4 | Status: SHIPPED | OUTPATIENT
Start: 2023-02-07

## 2023-02-07 NOTE — PROGRESS NOTES
Subjective:     Encounter Date:02/07/2023      Patient ID: Zachery Lomeli is a 47 y.o. male.    Chief Complaint: Coronary artery disease  HPI  This is a 47-year-old male patient who presents to cardiology clinic for routine follow-up.  The patient has been experiencing bilateral upper quadrant discomfort with nausea particularly after eating greasy or fried foods.  Right upper quadrant ultrasound demonstrates gallstones.  He has not yet seen a general surgeon.  He reports doing well from a cardiovascular standpoint with no active symptoms, issues or hospitalizations.  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  He reports compliance with his medications with no perceived side effects.  The patient had previously broken out in a rash on his arms.  After seeing an allergist this was thought to be either due to aspirin and/or clopidogrel therapy.  Both of these medications were stopped and the patient has been on Effient since then.  His rash has resolved and has not recurred.  He remains a reformed smoker.    The following portions of the patient's history were reviewed and updated as appropriate: allergies, current medications, past family history, past medical history, past social history, past surgical history and problem  Review of Systems   Constitutional: Negative for chills, diaphoresis, fever, malaise/fatigue, weight gain and weight loss.   HENT: Negative for ear discharge, hearing loss, hoarse voice and nosebleeds.    Eyes: Negative for discharge, double vision, pain and photophobia.   Cardiovascular: Negative for chest pain, claudication, cyanosis, dyspnea on exertion, irregular heartbeat, leg swelling, near-syncope, orthopnea, palpitations, paroxysmal nocturnal dyspnea and syncope.   Respiratory: Negative for cough, hemoptysis, shortness of breath, sputum production and wheezing.    Endocrine: Negative for cold intolerance, heat intolerance, polydipsia,  polyphagia and polyuria.   Hematologic/Lymphatic: Negative for adenopathy and bleeding problem. Does not bruise/bleed easily.   Skin: Negative for color change, flushing, itching and rash.   Musculoskeletal: Negative for muscle cramps, muscle weakness, myalgias and stiffness.   Gastrointestinal: Positive for abdominal pain. Negative for diarrhea, hematemesis, hematochezia, nausea and vomiting.   Genitourinary: Negative for dysuria, frequency and nocturia.   Neurological: Negative for focal weakness, loss of balance, numbness, paresthesias and seizures.   Psychiatric/Behavioral: Negative for altered mental status, hallucinations and suicidal ideas.   Allergic/Immunologic: Negative for HIV exposure, hives and persistent infections.           Current Outpatient Medications:   •  atorvastatin (LIPITOR) 80 MG tablet, Take 1 tablet by mouth Every Night., Disp: 90 tablet, Rfl: 2  •  Cholecalciferol (VITAMIN D-3) 1000 UNITS capsule, Take 1,000 Units by mouth Daily., Disp: , Rfl:   •  diphenhydrAMINE (BENADRYL) 25 MG tablet, Take 1 tablet by mouth Every 6 (Six) Hours As Needed for Itching., Disp: 30 tablet, Rfl: 0  •  famotidine (PEPCID) 20 MG tablet, Take 1 tablet by mouth 2 (Two) Times a Day As Needed for Indigestion., Disp: 60 tablet, Rfl: 3  •  losartan-hydrochlorothiazide (Hyzaar) 50-12.5 MG per tablet, Take 1 tablet by mouth Daily., Disp: 90 tablet, Rfl: 2  •  metoprolol succinate XL (TOPROL-XL) 50 MG 24 hr tablet, Take 1 tablet by mouth Daily., Disp: 90 tablet, Rfl: 2  •  prasugrel (EFFIENT) 10 MG tablet, Take 1 tablet by mouth Daily., Disp: 90 tablet, Rfl: 4  •  vitamin B-12 (CYANOCOBALAMIN) 1000 MCG tablet, Take 1,000 mcg by mouth Daily., Disp: , Rfl:     Objective:   Vitals and nursing note reviewed.   Constitutional:       Appearance: Healthy appearance. Not in distress.   Neck:      Vascular: No JVR. JVD normal.   Pulmonary:      Effort: Pulmonary effort is normal.      Breath sounds: Normal breath sounds. No  "wheezing. No rhonchi. No rales.   Chest:      Chest wall: Not tender to palpatation.   Cardiovascular:      PMI at left midclavicular line. Normal rate. Regular rhythm. Normal S1. Normal S2.      Murmurs: There is no murmur.      No gallop. No click. No rub.   Pulses:     Intact distal pulses.   Edema:     Peripheral edema absent.   Abdominal:      General: Bowel sounds are normal.      Palpations: Abdomen is soft.      Tenderness: There is no abdominal tenderness.   Musculoskeletal: Normal range of motion.         General: No tenderness. Skin:     General: Skin is warm and dry.   Neurological:      General: No focal deficit present.      Mental Status: Alert and oriented to person, place and time.       Blood pressure 120/78, pulse 71, height 170.2 cm (67\"), weight 96.2 kg (212 lb), SpO2 98 %.   Lab Review:     Assessment:       1. Coronary artery disease involving native coronary artery of native heart without angina pectoris      2. Benign essential hypertension  Acceptable blood pressure control.    3. Aortic valve sclerosis  No evidence of aortic stenosis.    4. Class 1 obesity with alveolar hypoventilation without serious comorbidity with body mass index (BMI) of 33.0 to 33.9 in adult (HCC)      5. Pre-operative cardiovascular examination  The patient is considered low risk of cardiovascular complication from gallbladder surgery if that course of action is desired.    6. Dyslipidemia  Tolerating high intensity statin based cholesterol-lowering therapy without side effects    Procedures    Plan:     If deemed necessary, cholecystectomy can be performed without further delay, testing or change in medications.  The patient has been advised that if it is the requirement or recommendation of the general surgeon that antiplatelet therapy be interrupted, his Effient can be safely stopped for 5-7 days in the perioperative timeframe.    No changes in medications have been made at today's visit.    No cardiovascular " testing is indicated at this time.

## 2023-02-09 ENCOUNTER — OFFICE VISIT (OUTPATIENT)
Dept: INTERNAL MEDICINE | Facility: CLINIC | Age: 48
End: 2023-02-09
Payer: COMMERCIAL

## 2023-02-09 VITALS
WEIGHT: 213 LBS | HEART RATE: 67 BPM | TEMPERATURE: 97.6 F | OXYGEN SATURATION: 98 % | DIASTOLIC BLOOD PRESSURE: 71 MMHG | SYSTOLIC BLOOD PRESSURE: 139 MMHG | HEIGHT: 67 IN | BODY MASS INDEX: 33.43 KG/M2 | RESPIRATION RATE: 16 BRPM

## 2023-02-09 DIAGNOSIS — I10 BENIGN ESSENTIAL HYPERTENSION: Primary | ICD-10-CM

## 2023-02-09 DIAGNOSIS — K80.10 CALCULUS OF GALLBLADDER WITH CHRONIC CHOLECYSTITIS WITHOUT OBSTRUCTION: ICD-10-CM

## 2023-02-09 DIAGNOSIS — R10.11 RUQ ABDOMINAL PAIN: ICD-10-CM

## 2023-02-09 PROCEDURE — 99214 OFFICE O/P EST MOD 30 MIN: CPT | Performed by: INTERNAL MEDICINE

## 2023-02-09 NOTE — PROGRESS NOTES
"Chief Complaint  Hypertension and Abdominal Pain    Subjective        Zachery Lomeli presents to [facility]  History of Present Illness  HPI: Patient is here to follow up on the blood pressure  The patient is taking the blood pressure medications as prescribed and has had no side effects. The patient is also here to follow up on right upper quadrant pain, he states he had pain last night which woke him up, he had some episodes of nausea, he had recent labs and ultrasound done which has been reviewed, he was seen by cardiology this week,  Hypertension   Pertinent negatives include no chest pain, palpitations or shortness of breath.  Answers for HPI/ROS submitted by the patient on 2/6/2023  What is the primary reason for your visit?: Abdominal Pain      Objective   Vital Signs:  /71   Pulse 67   Temp 97.6 °F (36.4 °C)   Resp 16   Ht 170.2 cm (67.01\")   Wt 96.6 kg (213 lb)   SpO2 98%   BMI 33.35 kg/m²   Estimated body mass index is 33.35 kg/m² as calculated from the following:    Height as of this encounter: 170.2 cm (67.01\").    Weight as of this encounter: 96.6 kg (213 lb).       BMI is >= 30 and <35. (Class 1 Obesity). The following options were offered after discussion;: weight loss educational material (shared in after visit summary), exercise counseling/recommendations and nutrition counseling/recommendations      Physical Exam  Vitals and nursing note reviewed.   Constitutional:       General: He is not in acute distress.     Appearance: Normal appearance. He is not diaphoretic.   HENT:      Head: Normocephalic and atraumatic.      Right Ear: External ear normal.      Left Ear: External ear normal.      Nose: Nose normal.   Eyes:      Extraocular Movements: Extraocular movements intact.      Conjunctiva/sclera: Conjunctivae normal.   Neck:      Thyroid: No thyromegaly.   Cardiovascular:      Rate and Rhythm: Normal rate and regular rhythm.      Heart sounds: Normal heart " sounds.   Pulmonary:      Effort: Pulmonary effort is normal.      Breath sounds: Normal breath sounds.   Abdominal:      General: Abdomen is flat.      Tenderness: There is abdominal tenderness.      Comments: ruq   Musculoskeletal:      Cervical back: Neck supple.   Skin:     General: Skin is warm.      Findings: No erythema.   Neurological:      Mental Status: He is alert and oriented to person, place, and time.      Comments: No gross  motor or sensory deficits        Result Review :    CMP    CMP 5/20/22 10/7/22 2/6/23   Glucose 108 (A) 107 (A) 100 (A)   BUN 15 13 11   Creatinine 0.95 0.89 0.90   eGFR   106.0   Sodium 142 143 135 (A)   Potassium 4.4 4.2 3.8   Chloride 107 106 100   Calcium 9.7 9.8 9.8   Total Protein 6.5 6.1    Total Protein   7.2   Albumin 4.30 4.60 4.6   Globulin 2.2 1.5    Globulin   2.6   Total Bilirubin 0.8 0.9 1.1   Alkaline Phosphatase 69 76 101   AST (SGOT) 14 19 19   ALT (SGPT) 19 25 23   Albumin/Globulin Ratio   1.8   BUN/Creatinine Ratio 15.8 14.6 12.2   Anion Gap   11.0   (A) Abnormal value            CBC    CBC 5/20/22 10/7/22 2/6/23   WBC 7.29 6.29 8.03   RBC 4.97 4.83 5.27   Hemoglobin 14.8 14.2 15.7   Hematocrit 44.1 42.0 43.0   MCV 88.7 87.0 81.6   MCH 29.8 29.4 29.8   MCHC 33.6 33.8 36.5 (A)   RDW 13.0 12.8 11.9 (A)   Platelets 246 226 254   (A) Abnormal value                US Gallbladder (02/07/2023 08:49)             Assessment and Plan   Diagnoses and all orders for this visit:    1. Benign essential hypertension (Primary)    2. RUQ abdominal pain  -     Ambulatory Referral to General Surgery    3. Calculus of gallbladder with chronic cholecystitis without obstruction  -     Ambulatory Referral to General Surgery    Plan:  1.  Benign essential hypertension: Will continue current medication, low-sodium diet advised, Counseled to regularly check BP at home with goal averaging <130/80.   2.  Right upper quadrant pain: Secondary to  3.  Calculus of gallbladder with chronic  cholecystitis: Labs and ultrasound reviewed, we will refer patient to surgery, he has been cleared by cardiology       I spent 30 minutes caring for Zachery on this date of service. This time includes time spent by me in the following activities:preparing for the visit, reviewing tests, performing a medically appropriate examination and/or evaluation , counseling and educating the patient/family/caregiver, ordering medications, tests, or procedures and documenting information in the medical record  Follow Up    Patient was given instructions and counseling regarding his condition or for health maintenance advice. Please see specific information pulled into the AVS if appropriate.

## 2023-02-13 ENCOUNTER — TELEPHONE (OUTPATIENT)
Dept: INTERNAL MEDICINE | Facility: CLINIC | Age: 48
End: 2023-02-13

## 2023-02-13 NOTE — TELEPHONE ENCOUNTER
Caller: Zachery Lomeli    Relationship: Self    Best call back number: [phone]    What is the medical concern/diagnosis: GALL BLADDER REMOVAL. PATIENT GETTING VERY SHAKY, EATING VERY MINIMUMLY, VERY NAUSEATED    What specialty or service is being requested: GALLBLADDER REMOVAL SURGERY    Any additional details: SURGERY WAS TO BE SCHEDULED LAST Thursday, PATIENT HAS NOT HEARD BACK. PLEASE CALL WITH GALLBLADDER REMOVAL SURGERY DETAILS.

## 2023-02-13 NOTE — TELEPHONE ENCOUNTER
Spoke with patient wife, she is on LYRIC, advised her to contact general surgery. She verbalized understanding

## 2023-02-14 ENCOUNTER — OFFICE VISIT (OUTPATIENT)
Dept: SURGERY | Facility: CLINIC | Age: 48
End: 2023-02-14
Payer: COMMERCIAL

## 2023-02-14 VITALS
RESPIRATION RATE: 16 BRPM | SYSTOLIC BLOOD PRESSURE: 132 MMHG | HEIGHT: 67 IN | BODY MASS INDEX: 32.96 KG/M2 | HEART RATE: 70 BPM | DIASTOLIC BLOOD PRESSURE: 78 MMHG | TEMPERATURE: 97.7 F | WEIGHT: 210 LBS | OXYGEN SATURATION: 98 %

## 2023-02-14 DIAGNOSIS — K80.70 CALCULUS OF GALLBLADDER AND BILE DUCT WITHOUT CHOLECYSTITIS OR OBSTRUCTION: Primary | ICD-10-CM

## 2023-02-14 PROCEDURE — 99204 OFFICE O/P NEW MOD 45 MIN: CPT | Performed by: SURGERY

## 2023-02-14 RX ORDER — SODIUM CHLORIDE 9 MG/ML
100 INJECTION, SOLUTION INTRAVENOUS CONTINUOUS
Status: CANCELLED | OUTPATIENT
Start: 2023-02-14
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
## 2023-02-16 ENCOUNTER — TELEPHONE (OUTPATIENT)
Dept: PREADMISSION TESTING | Facility: HOSPITAL | Age: 48
End: 2023-02-16

## 2023-02-17 ENCOUNTER — PRE-ADMISSION TESTING (OUTPATIENT)
Dept: PREADMISSION TESTING | Facility: HOSPITAL | Age: 48
End: 2023-02-17
Payer: COMMERCIAL

## 2023-02-17 ENCOUNTER — TELEPHONE (OUTPATIENT)
Dept: SURGERY | Facility: CLINIC | Age: 48
End: 2023-02-17
Payer: COMMERCIAL

## 2023-02-17 VITALS — BODY MASS INDEX: 34.21 KG/M2 | WEIGHT: 218 LBS | HEIGHT: 67 IN

## 2023-02-17 LAB
ANION GAP SERPL CALCULATED.3IONS-SCNC: 11.2 MMOL/L (ref 5–15)
BUN SERPL-MCNC: 14 MG/DL (ref 6–20)
BUN/CREAT SERPL: 15.7 (ref 7–25)
CALCIUM SPEC-SCNC: 9.4 MG/DL (ref 8.6–10.5)
CHLORIDE SERPL-SCNC: 103 MMOL/L (ref 98–107)
CO2 SERPL-SCNC: 24.8 MMOL/L (ref 22–29)
CREAT SERPL-MCNC: 0.89 MG/DL (ref 0.76–1.27)
DEPRECATED RDW RBC AUTO: 36.1 FL (ref 37–54)
EGFRCR SERPLBLD CKD-EPI 2021: 106.4 ML/MIN/1.73
ERYTHROCYTE [DISTWIDTH] IN BLOOD BY AUTOMATED COUNT: 11.9 % (ref 12.3–15.4)
GLUCOSE SERPL-MCNC: 109 MG/DL (ref 65–99)
HCT VFR BLD AUTO: 40.5 % (ref 37.5–51)
HGB BLD-MCNC: 14.3 G/DL (ref 13–17.7)
MCH RBC QN AUTO: 29.5 PG (ref 26.6–33)
MCHC RBC AUTO-ENTMCNC: 35.3 G/DL (ref 31.5–35.7)
MCV RBC AUTO: 83.5 FL (ref 79–97)
PLATELET # BLD AUTO: 252 10*3/MM3 (ref 140–450)
PMV BLD AUTO: 10 FL (ref 6–12)
POTASSIUM SERPL-SCNC: 3.9 MMOL/L (ref 3.5–5.2)
RBC # BLD AUTO: 4.85 10*6/MM3 (ref 4.14–5.8)
SODIUM SERPL-SCNC: 139 MMOL/L (ref 136–145)
WBC NRBC COR # BLD: 6.71 10*3/MM3 (ref 3.4–10.8)

## 2023-02-17 PROCEDURE — 36415 COLL VENOUS BLD VENIPUNCTURE: CPT

## 2023-02-17 PROCEDURE — 85027 COMPLETE CBC AUTOMATED: CPT

## 2023-02-17 PROCEDURE — 80048 BASIC METABOLIC PNL TOTAL CA: CPT

## 2023-02-17 NOTE — TELEPHONE ENCOUNTER
Spoke with Darcy to confirm the procedure on 02/22/2023 @ [facility].

## 2023-02-22 ENCOUNTER — ANESTHESIA (OUTPATIENT)
Dept: PERIOP | Facility: HOSPITAL | Age: 48
End: 2023-02-22
Payer: COMMERCIAL

## 2023-02-22 ENCOUNTER — HOSPITAL ENCOUNTER (OUTPATIENT)
Facility: HOSPITAL | Age: 48
Setting detail: HOSPITAL OUTPATIENT SURGERY
Discharge: HOME | End: 2023-02-22
Attending: SURGERY | Admitting: SURGERY
Payer: COMMERCIAL

## 2023-02-22 ENCOUNTER — APPOINTMENT (OUTPATIENT)
Dept: GENERAL RADIOLOGY | Facility: HOSPITAL | Age: 48
End: 2023-02-22
Payer: COMMERCIAL

## 2023-02-22 ENCOUNTER — ANESTHESIA EVENT (OUTPATIENT)
Dept: PERIOP | Facility: HOSPITAL | Age: 48
End: 2023-02-22
Payer: COMMERCIAL

## 2023-02-22 VITALS
RESPIRATION RATE: 20 BRPM | SYSTOLIC BLOOD PRESSURE: 133 MMHG | OXYGEN SATURATION: 99 % | DIASTOLIC BLOOD PRESSURE: 74 MMHG | HEART RATE: 55 BPM | TEMPERATURE: 97.2 F

## 2023-02-22 DIAGNOSIS — K80.70 CALCULUS OF GALLBLADDER AND BILE DUCT WITHOUT CHOLECYSTITIS OR OBSTRUCTION: ICD-10-CM

## 2023-02-22 PROCEDURE — 74300 X-RAY BILE DUCTS/PANCREAS: CPT

## 2023-02-22 PROCEDURE — 25010000002 IOPAMIDOL 61 % SOLUTION: Performed by: SURGERY

## 2023-02-22 PROCEDURE — 25010000002 MIDAZOLAM PER 1MG: Performed by: NURSE ANESTHETIST, CERTIFIED REGISTERED

## 2023-02-22 PROCEDURE — 88304 TISSUE EXAM BY PATHOLOGIST: CPT

## 2023-02-22 PROCEDURE — 25010000002 ONDANSETRON PER 1 MG: Performed by: NURSE ANESTHETIST, CERTIFIED REGISTERED

## 2023-02-22 PROCEDURE — 25010000002 HYDROMORPHONE 1 MG/ML SOLUTION: Performed by: NURSE ANESTHETIST, CERTIFIED REGISTERED

## 2023-02-22 PROCEDURE — 0 AMPICILLIN-SULBACTAM PER 1.5 G: Performed by: SURGERY

## 2023-02-22 PROCEDURE — C1894 INTRO/SHEATH, NON-LASER: HCPCS | Performed by: SURGERY

## 2023-02-22 PROCEDURE — 25010000002 SUCCINYLCHOLINE PER 20 MG: Performed by: NURSE ANESTHETIST, CERTIFIED REGISTERED

## 2023-02-22 PROCEDURE — 47563 LAPARO CHOLECYSTECTOMY/GRAPH: CPT | Performed by: SURGERY

## 2023-02-22 PROCEDURE — 25010000002 DEXAMETHASONE PER 1 MG: Performed by: NURSE ANESTHETIST, CERTIFIED REGISTERED

## 2023-02-22 PROCEDURE — 25010000002 FENTANYL CITRATE (PF) 100 MCG/2ML SOLUTION: Performed by: NURSE ANESTHETIST, CERTIFIED REGISTERED

## 2023-02-22 PROCEDURE — 25010000002 PROPOFOL 200 MG/20ML EMULSION: Performed by: NURSE ANESTHETIST, CERTIFIED REGISTERED

## 2023-02-22 DEVICE — LIGAMAX 5 MM ENDOSCOPIC MULTIPLE CLIP APPLIER
Type: IMPLANTABLE DEVICE | Site: ABDOMEN | Status: FUNCTIONAL
Brand: LIGAMAX

## 2023-02-22 RX ORDER — IPRATROPIUM BROMIDE AND ALBUTEROL SULFATE 2.5; .5 MG/3ML; MG/3ML
3 SOLUTION RESPIRATORY (INHALATION) ONCE
Status: DISCONTINUED | OUTPATIENT
Start: 2023-02-22 | End: 2023-02-22 | Stop reason: HOSPADM

## 2023-02-22 RX ORDER — SUCCINYLCHOLINE CHLORIDE 20 MG/ML
INJECTION INTRAMUSCULAR; INTRAVENOUS AS NEEDED
Status: DISCONTINUED | OUTPATIENT
Start: 2023-02-22 | End: 2023-02-22 | Stop reason: SURG

## 2023-02-22 RX ORDER — LIDOCAINE HYDROCHLORIDE 20 MG/ML
INJECTION, SOLUTION INTRAVENOUS AS NEEDED
Status: DISCONTINUED | OUTPATIENT
Start: 2023-02-22 | End: 2023-02-22 | Stop reason: SURG

## 2023-02-22 RX ORDER — LORAZEPAM 2 MG/ML
1 INJECTION INTRAMUSCULAR ONCE
Status: DISCONTINUED | OUTPATIENT
Start: 2023-02-22 | End: 2023-02-22 | Stop reason: HOSPADM

## 2023-02-22 RX ORDER — PROPOFOL 10 MG/ML
INJECTION, EMULSION INTRAVENOUS AS NEEDED
Status: DISCONTINUED | OUTPATIENT
Start: 2023-02-22 | End: 2023-02-22 | Stop reason: SURG

## 2023-02-22 RX ORDER — BUPIVACAINE HCL/0.9 % NACL/PF 0.125 %
PLASTIC BAG, INJECTION (ML) EPIDURAL AS NEEDED
Status: DISCONTINUED | OUTPATIENT
Start: 2023-02-22 | End: 2023-02-22 | Stop reason: SURG

## 2023-02-22 RX ORDER — SODIUM CHLORIDE 9 MG/ML
100 INJECTION, SOLUTION INTRAVENOUS CONTINUOUS
Status: DISCONTINUED | OUTPATIENT
Start: 2023-02-22 | End: 2023-02-22 | Stop reason: HOSPADM

## 2023-02-22 RX ORDER — HYDROCODONE BITARTRATE AND ACETAMINOPHEN 5; 325 MG/1; MG/1
1-2 TABLET ORAL EVERY 4 HOURS PRN
Qty: 10 TABLET | Refills: 0 | Status: SHIPPED | OUTPATIENT
Start: 2023-02-22 | End: 2023-02-24

## 2023-02-22 RX ORDER — DEXAMETHASONE SODIUM PHOSPHATE 4 MG/ML
INJECTION, SOLUTION INTRA-ARTICULAR; INTRALESIONAL; INTRAMUSCULAR; INTRAVENOUS; SOFT TISSUE AS NEEDED
Status: DISCONTINUED | OUTPATIENT
Start: 2023-02-22 | End: 2023-02-22 | Stop reason: SURG

## 2023-02-22 RX ORDER — ONDANSETRON 4 MG/1
4 TABLET, FILM COATED ORAL EVERY 8 HOURS PRN
Qty: 8 TABLET | Refills: 1 | Status: SHIPPED | OUTPATIENT
Start: 2023-02-22 | End: 2023-02-27

## 2023-02-22 RX ORDER — ONDANSETRON 2 MG/ML
INJECTION INTRAMUSCULAR; INTRAVENOUS AS NEEDED
Status: DISCONTINUED | OUTPATIENT
Start: 2023-02-22 | End: 2023-02-22 | Stop reason: SURG

## 2023-02-22 RX ORDER — MIDAZOLAM HYDROCHLORIDE 2 MG/2ML
INJECTION, SOLUTION INTRAMUSCULAR; INTRAVENOUS AS NEEDED
Status: DISCONTINUED | OUTPATIENT
Start: 2023-02-22 | End: 2023-02-22 | Stop reason: SURG

## 2023-02-22 RX ORDER — IBUPROFEN 800 MG/1
800 TABLET ORAL EVERY 6 HOURS PRN
Qty: 14 TABLET | Refills: 0 | Status: SHIPPED | OUTPATIENT
Start: 2023-02-22 | End: 2023-02-27

## 2023-02-22 RX ORDER — ONDANSETRON 2 MG/ML
4 INJECTION INTRAMUSCULAR; INTRAVENOUS ONCE
Status: COMPLETED | OUTPATIENT
Start: 2023-02-22 | End: 2023-02-22

## 2023-02-22 RX ORDER — BUPIVACAINE HYDROCHLORIDE 2.5 MG/ML
INJECTION, SOLUTION EPIDURAL; INFILTRATION; INTRACAUDAL AS NEEDED
Status: DISCONTINUED | OUTPATIENT
Start: 2023-02-22 | End: 2023-02-22 | Stop reason: HOSPADM

## 2023-02-22 RX ORDER — FENTANYL CITRATE 50 UG/ML
INJECTION, SOLUTION INTRAMUSCULAR; INTRAVENOUS AS NEEDED
Status: DISCONTINUED | OUTPATIENT
Start: 2023-02-22 | End: 2023-02-22 | Stop reason: SURG

## 2023-02-22 RX ORDER — ROCURONIUM BROMIDE 10 MG/ML
INJECTION, SOLUTION INTRAVENOUS AS NEEDED
Status: DISCONTINUED | OUTPATIENT
Start: 2023-02-22 | End: 2023-02-22 | Stop reason: SURG

## 2023-02-22 RX ORDER — KETAMINE HCL IN NACL, ISO-OSM 100MG/10ML
SYRINGE (ML) INJECTION AS NEEDED
Status: DISCONTINUED | OUTPATIENT
Start: 2023-02-22 | End: 2023-02-22 | Stop reason: SURG

## 2023-02-22 RX ORDER — SODIUM CHLORIDE, SODIUM LACTATE, POTASSIUM CHLORIDE, CALCIUM CHLORIDE 600; 310; 30; 20 MG/100ML; MG/100ML; MG/100ML; MG/100ML
INJECTION, SOLUTION INTRAVENOUS CONTINUOUS PRN
Status: DISCONTINUED | OUTPATIENT
Start: 2023-02-22 | End: 2023-02-22 | Stop reason: SURG

## 2023-02-22 RX ADMIN — ROCURONIUM BROMIDE 10 MG: 10 INJECTION INTRAVENOUS at 09:28

## 2023-02-22 RX ADMIN — SODIUM CHLORIDE 100 ML/HR: 9 INJECTION, SOLUTION INTRAVENOUS at 08:35

## 2023-02-22 RX ADMIN — MIDAZOLAM HYDROCHLORIDE 2 MG: 1 INJECTION, SOLUTION INTRAMUSCULAR; INTRAVENOUS at 09:21

## 2023-02-22 RX ADMIN — MIDAZOLAM HYDROCHLORIDE 2 MG: 1 INJECTION, SOLUTION INTRAMUSCULAR; INTRAVENOUS at 11:01

## 2023-02-22 RX ADMIN — SODIUM CHLORIDE, POTASSIUM CHLORIDE, SODIUM LACTATE AND CALCIUM CHLORIDE: 600; 310; 30; 20 INJECTION, SOLUTION INTRAVENOUS at 09:06

## 2023-02-22 RX ADMIN — SUGAMMADEX 200 MG: 100 INJECTION, SOLUTION INTRAVENOUS at 10:57

## 2023-02-22 RX ADMIN — PROPOFOL 150 MG: 10 INJECTION, EMULSION INTRAVENOUS at 09:29

## 2023-02-22 RX ADMIN — ROCURONIUM BROMIDE 30 MG: 10 INJECTION INTRAVENOUS at 09:37

## 2023-02-22 RX ADMIN — HYDROMORPHONE HYDROCHLORIDE 0.5 MG: 1 INJECTION, SOLUTION INTRAMUSCULAR; INTRAVENOUS; SUBCUTANEOUS at 12:30

## 2023-02-22 RX ADMIN — Medication 200 MCG: at 10:29

## 2023-02-22 RX ADMIN — ONDANSETRON 4 MG: 2 INJECTION INTRAMUSCULAR; INTRAVENOUS at 11:29

## 2023-02-22 RX ADMIN — SUGAMMADEX 200 MG: 100 INJECTION, SOLUTION INTRAVENOUS at 10:45

## 2023-02-22 RX ADMIN — ONDANSETRON 4 MG: 2 INJECTION INTRAMUSCULAR; INTRAVENOUS at 09:37

## 2023-02-22 RX ADMIN — LIDOCAINE HYDROCHLORIDE 40 MG: 20 INJECTION, SOLUTION INTRAVENOUS at 09:28

## 2023-02-22 RX ADMIN — GLYCOPYRROLATE 0.2 MG: 0.2 INJECTION, SOLUTION INTRAMUSCULAR; INTRAVENOUS at 10:26

## 2023-02-22 RX ADMIN — Medication 25 MG: at 09:32

## 2023-02-22 RX ADMIN — FENTANYL CITRATE 50 MCG: 50 INJECTION INTRAMUSCULAR; INTRAVENOUS at 09:23

## 2023-02-22 RX ADMIN — FENTANYL CITRATE 50 MCG: 50 INJECTION INTRAMUSCULAR; INTRAVENOUS at 09:29

## 2023-02-22 RX ADMIN — SUCCINYLCHOLINE CHLORIDE 180 MG: 20 INJECTION, SOLUTION INTRAMUSCULAR; INTRAVENOUS at 09:29

## 2023-02-22 RX ADMIN — SODIUM CHLORIDE, POTASSIUM CHLORIDE, SODIUM LACTATE AND CALCIUM CHLORIDE: 600; 310; 30; 20 INJECTION, SOLUTION INTRAVENOUS at 10:27

## 2023-02-22 RX ADMIN — Medication 3 G: at 09:21

## 2023-02-22 RX ADMIN — DEXAMETHASONE SODIUM PHOSPHATE 10 MG: 4 INJECTION, SOLUTION INTRAMUSCULAR; INTRAVENOUS at 09:38

## 2023-02-22 NOTE — ANESTHESIA POSTPROCEDURE EVALUATION
Patient: Zachery Lomeli    Procedure Summary     Date: 02/22/23 Room / Location: [facility] OR 1 / [facility] OR    Anesthesia Start: 0921 Anesthesia Stop:     Procedure: CHOLECYSTECTOMY LAPAROSCOPIC INTRAOPERATIVE CHOLANGIOGRAPHY (Abdomen) Diagnosis:       Calculus of gallbladder and bile duct without cholecystitis or obstruction      (Calculus of gallbladder and bile duct without cholecystitis or obstruction [K80.70])    Surgeons: Russell Esteban MD Provider: Vincent Davis CRNA    Anesthesia Type: general ASA Status: 3          Anesthesia Type: general    Vitals  Vitals Value Taken Time   BP     Temp     Pulse     Resp     SpO2 100 % 02/22/23 1104   Vitals shown include unvalidated device data.        Post Anesthesia Care and Evaluation    Patient location during evaluation: PHASE II  Patient participation: complete - patient participated  Level of consciousness: awake  Pain score: 0  Pain management: adequate    Airway patency: patent  Anesthetic complications: No anesthetic complications  PONV Status: none  Cardiovascular status: acceptable  Respiratory status: acceptable and face mask  Hydration status: acceptable    Comments: vsss resp spont, reflexes intact, responsive, report given to pacu nurse.See R.N. note for postop vital signs.

## 2023-02-22 NOTE — ANESTHESIA PROCEDURE NOTES
Airway  Urgency: elective    Date/Time: 2/22/2023 9:22 AM  Airway not difficult    General Information and Staff    Patient location during procedure: OR  CRNA/CAA: Vincent Davis CRNA    Indications and Patient Condition  Indications for airway management: airway protection    Preoxygenated: yes  Mask difficulty assessment: 1 - vent by mask    Final Airway Details  Final airway type: endotracheal airway      Successful airway: ETT  Cuffed: yes   Successful intubation technique: direct laryngoscopy  Facilitating devices/methods: intubating stylet and anterior pressure/BURP  Endotracheal tube insertion site: oral  Blade: Serafin  Blade size: 3  ETT size (mm): 7.5  Cormack-Lehane Classification: grade IIa - partial view of glottis  Placement verified by: chest auscultation, capnometry and palpation of cuff   Inital cuff pressure (cm H2O): 0  Cuff volume (mL): 5  Measured from: lips  ETT/EBT  to lips (cm): 21  Number of attempts at approach: 1  Assessment: lips, teeth, and gum same as pre-op and atraumatic intubation    Additional Comments  Intubated by dvnt, cuff up with mov, bbs and expansion =, + etco, taped at lips, tolerated induction and intubation without adverse rx.

## 2023-02-22 NOTE — OP NOTE
PATIENT:     Zachery Lomeli    DATE OF SURGERY:     2/22/2023    PHYSICIAN:   Russell sEteban MD    REFERRING PHYSICIAN:  Keila Bolivar MD     YOB: 1975    PREOPERATIVE DIAGNOSIS:  Chronic cholecystitis due to Cholelithiasis    POSTOPERATIVE DIAGNOSIS:  Chronic cholecystitis due to Cholelithiasis    PROCEDURE:  Laparoscopic cholecystectomy with intraoperative cholangiography.    ANESTHESIA:  General endotracheal.    EBL: Less than 30 mL    Specimen: Gallbladder    Complications: None    OPERATIVE PROCEDURE:  The patient was taken to the operating room, placed in the supine position, and given general endotracheal anesthesia.  The patient was prepped and draped in the normal sterile fashion, and also received preoperative IV antibiotics.  An appropriate timeout was performed by the nursing staff prior to the incision.     Local anesthetic with Marcaine 0.5% was infused locally at all incision sites. An infraumbilical incision was then made to insert a 5 mm Optiview into the peritoneal cavity and the peritoneal cavity was insufflated with carbon dioxide. A separate 5 mm port was inserted in the subxiphoid region  along with a right subcostal 5 mm port.  The gallbladder was well visualized.    Good exposure was obtained, and the gallbladder was grasped at its infundibulum and its fundus and retracted superiorly and laterally.  There were some chronic attachments of fatty tissue to the gallbladder indicative of chronic cholecystitis and these were easily taken down.    Good exposure was obtained and dissection was performed at the junction of the cystic duct and the gallbladder. The cystic duct and cystic artery were identified in the normal manner.  A clip was then placed on the cystic duct proximally and then two clips were placed on the cystic artery proximally and one distally.  Cystic ductotomy was performed.  A separate cholangiogram catheter had been inserted through a right upper quadrant  introducer port and then this was fed into the cystic duct itself and a clip was applied.     Intraoperative cholangiography was then performed under fluoroscopy, carefully evaluating the biliary ductal anatomy.  This was done without difficulty.  No anatomic abnormality was noted.      The cholangiogram catheter was removed.  The cystic duct was clipped twice distally and then divided, as was the cystic artery.  Bovie electrocautery with L-hook was then used to remove the gallbladder from the liver bed.  It was then removed through the umbilical port site. Fascia was closed with a 2-O Vicryl figure of eight suture.    Copious irrigation was used in the patient’s abdominal cavity.  It was clean and dry at this point.  Hemostasis had been well controlled.  Hemostasis was intact and there was no evidence of bilious leakage. Trocars were removed under direct vision and the skin was closed with 4-0 PDS subcuticular stitch along with Steri-Strips for skin reapproximation.  There were no complications.    The patient was stable at this point and subsequently transferred back to the recovery room in stable condition.    Russell Esteban MD    2/22/2023    10:48 EST

## 2023-02-24 LAB
ALBUMIN SERPL-MCNC: 4.5 G/DL (ref 3.5–5.2)
ALBUMIN/GLOB SERPL: 2.8 G/DL
ALP SERPL-CCNC: 67 U/L (ref 39–117)
ALT SERPL-CCNC: 28 U/L (ref 1–41)
AST SERPL-CCNC: 15 U/L (ref 1–40)
BASOPHILS # BLD AUTO: 0.04 10*3/MM3 (ref 0–0.2)
BASOPHILS NFR BLD AUTO: 0.3 % (ref 0–1.5)
BILIRUB SERPL-MCNC: 0.7 MG/DL (ref 0–1.2)
BUN SERPL-MCNC: 18 MG/DL (ref 6–20)
BUN/CREAT SERPL: 15.9 (ref 7–25)
CALCIUM SERPL-MCNC: 9.4 MG/DL (ref 8.6–10.5)
CHLORIDE SERPL-SCNC: 107 MMOL/L (ref 98–107)
CHOLEST SERPL-MCNC: 111 MG/DL (ref 0–200)
CO2 SERPL-SCNC: 28.9 MMOL/L (ref 22–29)
CREAT SERPL-MCNC: 1.13 MG/DL (ref 0.76–1.27)
EGFRCR SERPLBLD CKD-EPI 2021: 80.7 ML/MIN/1.73
EOSINOPHIL # BLD AUTO: 0.02 10*3/MM3 (ref 0–0.4)
EOSINOPHIL NFR BLD AUTO: 0.2 % (ref 0.3–6.2)
ERYTHROCYTE [DISTWIDTH] IN BLOOD BY AUTOMATED COUNT: 12.6 % (ref 12.3–15.4)
GLOBULIN SER CALC-MCNC: 1.6 GM/DL
GLUCOSE SERPL-MCNC: 101 MG/DL (ref 65–99)
HBA1C MFR BLD: 5.6 % (ref 4.8–5.6)
HCT VFR BLD AUTO: 38.2 % (ref 37.5–51)
HDLC SERPL-MCNC: 52 MG/DL (ref 40–60)
HGB BLD-MCNC: 13.3 G/DL (ref 13–17.7)
IMM GRANULOCYTES # BLD AUTO: 0.06 10*3/MM3 (ref 0–0.05)
IMM GRANULOCYTES NFR BLD AUTO: 0.5 % (ref 0–0.5)
LDLC SERPL CALC-MCNC: 45 MG/DL (ref 0–100)
LYMPHOCYTES # BLD AUTO: 2.99 10*3/MM3 (ref 0.7–3.1)
LYMPHOCYTES NFR BLD AUTO: 25.7 % (ref 19.6–45.3)
MCH RBC QN AUTO: 29.8 PG (ref 26.6–33)
MCHC RBC AUTO-ENTMCNC: 34.8 G/DL (ref 31.5–35.7)
MCV RBC AUTO: 85.7 FL (ref 79–97)
MONOCYTES # BLD AUTO: 0.65 10*3/MM3 (ref 0.1–0.9)
MONOCYTES NFR BLD AUTO: 5.6 % (ref 5–12)
NEUTROPHILS # BLD AUTO: 7.88 10*3/MM3 (ref 1.7–7)
NEUTROPHILS NFR BLD AUTO: 67.7 % (ref 42.7–76)
NRBC BLD AUTO-RTO: 0 /100 WBC (ref 0–0.2)
PLATELET # BLD AUTO: 233 10*3/MM3 (ref 140–450)
POTASSIUM SERPL-SCNC: 4 MMOL/L (ref 3.5–5.2)
PROT SERPL-MCNC: 6.1 G/DL (ref 6–8.5)
RBC # BLD AUTO: 4.46 10*6/MM3 (ref 4.14–5.8)
REF LAB TEST METHOD: NORMAL
SODIUM SERPL-SCNC: 145 MMOL/L (ref 136–145)
TRIGL SERPL-MCNC: 66 MG/DL (ref 0–150)
VLDLC SERPL CALC-MCNC: 14 MG/DL (ref 5–40)
WBC # BLD AUTO: 11.64 10*3/MM3 (ref 3.4–10.8)

## 2023-02-28 ENCOUNTER — OFFICE VISIT (OUTPATIENT)
Dept: INTERNAL MEDICINE | Facility: CLINIC | Age: 48
End: 2023-02-28
Payer: COMMERCIAL

## 2023-02-28 VITALS
WEIGHT: 213 LBS | RESPIRATION RATE: 16 BRPM | HEIGHT: 67 IN | HEART RATE: 60 BPM | OXYGEN SATURATION: 98 % | DIASTOLIC BLOOD PRESSURE: 69 MMHG | TEMPERATURE: 98.2 F | SYSTOLIC BLOOD PRESSURE: 103 MMHG | BODY MASS INDEX: 33.43 KG/M2

## 2023-02-28 DIAGNOSIS — E78.2 MIXED HYPERLIPIDEMIA: ICD-10-CM

## 2023-02-28 DIAGNOSIS — I25.10 CORONARY ARTERY DISEASE INVOLVING NATIVE CORONARY ARTERY OF NATIVE HEART WITHOUT ANGINA PECTORIS: ICD-10-CM

## 2023-02-28 DIAGNOSIS — R73.01 IMPAIRED FASTING GLUCOSE: ICD-10-CM

## 2023-02-28 DIAGNOSIS — I10 BENIGN ESSENTIAL HYPERTENSION: Primary | ICD-10-CM

## 2023-02-28 DIAGNOSIS — Z23 NEED FOR COVID-19 VACCINE: ICD-10-CM

## 2023-02-28 PROCEDURE — 91312 COVID-19 (PFIZER) BIVALENT BOOSTER 12+YRS: CPT | Performed by: INTERNAL MEDICINE

## 2023-02-28 PROCEDURE — 0124A COVID-19 (PFIZER) BIVALENT BOOSTER 12+YRS: CPT | Performed by: INTERNAL MEDICINE

## 2023-02-28 PROCEDURE — 99214 OFFICE O/P EST MOD 30 MIN: CPT | Performed by: INTERNAL MEDICINE

## 2023-02-28 RX ORDER — METOPROLOL SUCCINATE 50 MG/1
50 TABLET, EXTENDED RELEASE ORAL DAILY
Qty: 90 TABLET | Refills: 2 | Status: SHIPPED | OUTPATIENT
Start: 2023-02-28

## 2023-02-28 RX ORDER — LOSARTAN POTASSIUM AND HYDROCHLOROTHIAZIDE 12.5; 5 MG/1; MG/1
1 TABLET ORAL DAILY
Qty: 90 TABLET | Refills: 2 | Status: SHIPPED | OUTPATIENT
Start: 2023-02-28

## 2023-03-02 ENCOUNTER — OFFICE VISIT (OUTPATIENT)
Dept: SURGERY | Facility: CLINIC | Age: 48
End: 2023-03-02
Payer: COMMERCIAL

## 2023-03-02 ENCOUNTER — PATIENT ROUNDING (BHMG ONLY) (OUTPATIENT)
Dept: SURGERY | Facility: CLINIC | Age: 48
End: 2023-03-02
Payer: COMMERCIAL

## 2023-03-02 VITALS
WEIGHT: 213 LBS | TEMPERATURE: 97.8 F | DIASTOLIC BLOOD PRESSURE: 64 MMHG | OXYGEN SATURATION: 96 % | HEART RATE: 82 BPM | SYSTOLIC BLOOD PRESSURE: 102 MMHG | RESPIRATION RATE: 16 BRPM | BODY MASS INDEX: 33.43 KG/M2 | HEIGHT: 67 IN

## 2023-03-02 DIAGNOSIS — Z48.89 POSTOPERATIVE VISIT: Primary | ICD-10-CM

## 2023-03-02 PROCEDURE — 99024 POSTOP FOLLOW-UP VISIT: CPT | Performed by: SURGERY

## 2023-03-02 NOTE — PROGRESS NOTES
March 2, 2023    Hello, may I speak with Zachery Lomeli?    My name is Sol    I am  with [facility] [address]  [facility] [id number] [phone].    Before we get started may I verify your date of birth? 1975    I am calling to officially welcome you to our practice and ask about your recent visit. Is this a good time to talk? yes    Tell me about your visit with us. What things went well?  Good       We're always looking for ways to make our patients' experiences even better. Do you have recommendations on ways we may improve?  no    Overall were you satisfied with your first visit to our practice? yes       I appreciate you taking the time to speak with me today. Is there anything else I can do for you? no      Thank you, and have a great day.

## 2023-06-09 LAB
ALBUMIN SERPL-MCNC: 4.5 G/DL (ref 3.5–5.2)
ALBUMIN/GLOB SERPL: 2.3 G/DL
ALP SERPL-CCNC: 84 U/L (ref 39–117)
ALT SERPL-CCNC: 25 U/L (ref 1–41)
AST SERPL-CCNC: 16 U/L (ref 1–40)
BASOPHILS # BLD AUTO: 0.04 10*3/MM3 (ref 0–0.2)
BASOPHILS NFR BLD AUTO: 0.6 % (ref 0–1.5)
BILIRUB SERPL-MCNC: 1 MG/DL (ref 0–1.2)
BUN SERPL-MCNC: 13 MG/DL (ref 6–20)
BUN/CREAT SERPL: 13.5 (ref 7–25)
CALCIUM SERPL-MCNC: 10 MG/DL (ref 8.6–10.5)
CHLORIDE SERPL-SCNC: 105 MMOL/L (ref 98–107)
CHOLEST SERPL-MCNC: 116 MG/DL (ref 0–200)
CO2 SERPL-SCNC: 28.7 MMOL/L (ref 22–29)
CREAT SERPL-MCNC: 0.96 MG/DL (ref 0.76–1.27)
EGFRCR SERPLBLD CKD-EPI 2021: 98.1 ML/MIN/1.73
EOSINOPHIL # BLD AUTO: 0.12 10*3/MM3 (ref 0–0.4)
EOSINOPHIL NFR BLD AUTO: 1.9 % (ref 0.3–6.2)
ERYTHROCYTE [DISTWIDTH] IN BLOOD BY AUTOMATED COUNT: 12.2 % (ref 12.3–15.4)
GLOBULIN SER CALC-MCNC: 2 GM/DL
GLUCOSE SERPL-MCNC: 116 MG/DL (ref 65–99)
HBA1C MFR BLD: 5.4 % (ref 4.8–5.6)
HCT VFR BLD AUTO: 42.4 % (ref 37.5–51)
HDLC SERPL-MCNC: 45 MG/DL (ref 40–60)
HGB BLD-MCNC: 14.6 G/DL (ref 13–17.7)
IMM GRANULOCYTES # BLD AUTO: 0.03 10*3/MM3 (ref 0–0.05)
IMM GRANULOCYTES NFR BLD AUTO: 0.5 % (ref 0–0.5)
LDLC SERPL CALC-MCNC: 57 MG/DL (ref 0–100)
LYMPHOCYTES # BLD AUTO: 1.88 10*3/MM3 (ref 0.7–3.1)
LYMPHOCYTES NFR BLD AUTO: 30.2 % (ref 19.6–45.3)
MCH RBC QN AUTO: 29.6 PG (ref 26.6–33)
MCHC RBC AUTO-ENTMCNC: 34.4 G/DL (ref 31.5–35.7)
MCV RBC AUTO: 86 FL (ref 79–97)
MONOCYTES # BLD AUTO: 0.43 10*3/MM3 (ref 0.1–0.9)
MONOCYTES NFR BLD AUTO: 6.9 % (ref 5–12)
NEUTROPHILS # BLD AUTO: 3.73 10*3/MM3 (ref 1.7–7)
NEUTROPHILS NFR BLD AUTO: 59.9 % (ref 42.7–76)
NRBC BLD AUTO-RTO: 0 /100 WBC (ref 0–0.2)
PLATELET # BLD AUTO: 227 10*3/MM3 (ref 140–450)
POTASSIUM SERPL-SCNC: 4 MMOL/L (ref 3.5–5.2)
PROT SERPL-MCNC: 6.5 G/DL (ref 6–8.5)
RBC # BLD AUTO: 4.93 10*6/MM3 (ref 4.14–5.8)
SODIUM SERPL-SCNC: 144 MMOL/L (ref 136–145)
TRIGL SERPL-MCNC: 64 MG/DL (ref 0–150)
VLDLC SERPL CALC-MCNC: 14 MG/DL (ref 5–40)
WBC # BLD AUTO: 6.23 10*3/MM3 (ref 3.4–10.8)

## 2023-06-13 ENCOUNTER — OFFICE VISIT (OUTPATIENT)
Dept: INTERNAL MEDICINE | Facility: CLINIC | Age: 48
End: 2023-06-13
Payer: COMMERCIAL

## 2023-06-13 VITALS
SYSTOLIC BLOOD PRESSURE: 105 MMHG | HEIGHT: 67 IN | HEART RATE: 59 BPM | RESPIRATION RATE: 16 BRPM | OXYGEN SATURATION: 98 % | WEIGHT: 215 LBS | BODY MASS INDEX: 33.74 KG/M2 | TEMPERATURE: 98.1 F | DIASTOLIC BLOOD PRESSURE: 70 MMHG

## 2023-06-13 DIAGNOSIS — E78.2 MIXED HYPERLIPIDEMIA: ICD-10-CM

## 2023-06-13 DIAGNOSIS — R73.01 IMPAIRED FASTING GLUCOSE: ICD-10-CM

## 2023-06-13 DIAGNOSIS — I10 BENIGN ESSENTIAL HYPERTENSION: Primary | ICD-10-CM

## 2023-06-13 DIAGNOSIS — I25.10 CORONARY ARTERY DISEASE INVOLVING NATIVE CORONARY ARTERY OF NATIVE HEART WITHOUT ANGINA PECTORIS: ICD-10-CM

## 2023-06-13 PROCEDURE — 99214 OFFICE O/P EST MOD 30 MIN: CPT | Performed by: INTERNAL MEDICINE

## 2023-06-13 RX ORDER — ATORVASTATIN CALCIUM 80 MG/1
80 TABLET, FILM COATED ORAL NIGHTLY
Qty: 90 TABLET | Refills: 2 | Status: SHIPPED | OUTPATIENT
Start: 2023-06-13

## 2023-06-13 NOTE — PROGRESS NOTES
"Chief Complaint  Hypertension and Hyperlipidemia    Subjective        Zachery Lomeli presents to [facility] PRIMARY [facility]ARE  History of Present Illness  HPI: Patient is here to follow up on the blood pressure  The patient is taking the blood pressure medications as prescribed and has had no side effects. The patient is also here to follow up on the cholesterol and is trying to follow a diet. The patient  had lab work done .  The patient also is here to follow-up on CAD and was seen by cardiology and needs refills on medications .   Hyperlipidemia   Pertinent negatives include no chest pain or shortness of breath.   Hypertension   Pertinent negatives include no chest pain, palpitations or shortness of breath.    Objective   Vital Signs:  /70   Pulse 59   Temp 98.1 °F (36.7 °C)   Resp 16   Ht 170.2 cm (67.01\")   Wt 97.5 kg (215 lb)   SpO2 98%   BMI 33.67 kg/m²   Estimated body mass index is 33.67 kg/m² as calculated from the following:    Height as of this encounter: 170.2 cm (67.01\").    Weight as of this encounter: 97.5 kg (215 lb).               Physical Exam  Vitals and nursing note reviewed.   Constitutional:       General: He is not in acute distress.     Appearance: Normal appearance. He is not diaphoretic.   HENT:      Head: Normocephalic and atraumatic.      Right Ear: External ear normal.      Left Ear: External ear normal.      Nose: Nose normal.   Eyes:      Extraocular Movements: Extraocular movements intact.      Conjunctiva/sclera: Conjunctivae normal.   Neck:      Trachea: Trachea normal.   Cardiovascular:      Rate and Rhythm: Normal rate and regular rhythm.      Heart sounds: Normal heart sounds.   Pulmonary:      Effort: Pulmonary effort is normal. No respiratory distress.   Abdominal:      General: Abdomen is flat.   Musculoskeletal:      Cervical back: Neck supple.      Comments: Moves all limbs   Skin:     General: Skin is warm and dry.      Findings: No erythema. "   Neurological:      Mental Status: He is alert and oriented to person, place, and time.      Comments: No gross motor or sensory deficits      Result Review :  The following data was reviewed by: Keila Bolivar MD on 06/13/2023:  Common labs        2/17/2023    10:37 2/24/2023    08:36 6/9/2023    08:37   Common Labs   Glucose 109  101  116    BUN 14  18  13    Creatinine 0.89  1.13  0.96    Sodium 139  145  144    Potassium 3.9  4.0  4.0    Chloride 103  107  105    Calcium 9.4  9.4  10.0    Total Protein  6.1  6.5    Albumin  4.5  4.5    Total Bilirubin  0.7  1.0    Alkaline Phosphatase  67  84    AST (SGOT)  15  16    ALT (SGPT)  28  25    WBC 6.71  11.64  6.23    Hemoglobin 14.3  13.3  14.6    Hematocrit 40.5  38.2  42.4    Platelets 252  233  227    Total Cholesterol  111  116    Triglycerides  66  64    HDL Cholesterol  52  45    LDL Cholesterol   45  57    Hemoglobin A1C  5.60  5.40                   Assessment and Plan   Diagnoses and all orders for this visit:    1. Benign essential hypertension (Primary)    2. Mixed hyperlipidemia  -     CBC & Differential  -     Comprehensive Metabolic Panel  -     Lipid Panel  -     atorvastatin (LIPITOR) 80 MG tablet; Take 1 tablet by mouth Every Night.  Dispense: 90 tablet; Refill: 2    3. Impaired fasting glucose  -     Hemoglobin A1c    4. Coronary artery disease involving native coronary artery of native heart without angina pectoris      Plan:  1.  Benign essential hypertension: Will continue current medication, low-sodium diet advised, Counseled to regularly check BP at home with goal averaging <130/80.   2.mixed hyperlipidemia:  reviewed  fasting CMP and lipid panel.  Diet and exercise counseled,  Will continue current medications  3. impaired glucose   :   reviewed  fasting CMP  and hba1c  , diet and exercise counseled  4.  Cad : on effient and statin , he had a rash with asa/plavix, to continue current medications per cardiology       I spent 30 minutes caring  lynn Bingham on this date of service. This time includes time spent by me in the following activities:preparing for the visit, reviewing tests, performing a medically appropriate examination and/or evaluation , counseling and educating the patient/family/caregiver, ordering medications, tests, or procedures, and documenting information in the medical record  Follow Up   Return in about 4 months (around 10/12/2023).  Patient was given instructions and counseling regarding his condition or for health maintenance advice. Please see specific information pulled into the AVS if appropriate.

## 2023-11-10 LAB
ALBUMIN SERPL-MCNC: 4.6 G/DL (ref 3.5–5.2)
ALBUMIN/GLOB SERPL: 2.6 G/DL
ALP SERPL-CCNC: 92 U/L (ref 39–117)
ALT SERPL-CCNC: 28 U/L (ref 1–41)
AST SERPL-CCNC: 20 U/L (ref 1–40)
BASOPHILS # BLD AUTO: 0.04 10*3/MM3 (ref 0–0.2)
BASOPHILS NFR BLD AUTO: 0.6 % (ref 0–1.5)
BILIRUB SERPL-MCNC: 1.1 MG/DL (ref 0–1.2)
BUN SERPL-MCNC: 13 MG/DL (ref 6–20)
BUN/CREAT SERPL: 13.7 (ref 7–25)
CALCIUM SERPL-MCNC: 9.6 MG/DL (ref 8.6–10.5)
CHLORIDE SERPL-SCNC: 105 MMOL/L (ref 98–107)
CHOLEST SERPL-MCNC: 107 MG/DL (ref 0–200)
CO2 SERPL-SCNC: 28.9 MMOL/L (ref 22–29)
CREAT SERPL-MCNC: 0.95 MG/DL (ref 0.76–1.27)
EGFRCR SERPLBLD CKD-EPI 2021: 98.7 ML/MIN/1.73
EOSINOPHIL # BLD AUTO: 0.15 10*3/MM3 (ref 0–0.4)
EOSINOPHIL NFR BLD AUTO: 2.1 % (ref 0.3–6.2)
ERYTHROCYTE [DISTWIDTH] IN BLOOD BY AUTOMATED COUNT: 12.6 % (ref 12.3–15.4)
GLOBULIN SER CALC-MCNC: 1.8 GM/DL
GLUCOSE SERPL-MCNC: 110 MG/DL (ref 65–99)
HBA1C MFR BLD: 5.7 % (ref 4.8–5.6)
HCT VFR BLD AUTO: 41.3 % (ref 37.5–51)
HDLC SERPL-MCNC: 41 MG/DL (ref 40–60)
HGB BLD-MCNC: 14.2 G/DL (ref 13–17.7)
IMM GRANULOCYTES # BLD AUTO: 0.03 10*3/MM3 (ref 0–0.05)
IMM GRANULOCYTES NFR BLD AUTO: 0.4 % (ref 0–0.5)
LDLC SERPL CALC-MCNC: 52 MG/DL (ref 0–100)
LYMPHOCYTES # BLD AUTO: 1.86 10*3/MM3 (ref 0.7–3.1)
LYMPHOCYTES NFR BLD AUTO: 26.3 % (ref 19.6–45.3)
MCH RBC QN AUTO: 30.1 PG (ref 26.6–33)
MCHC RBC AUTO-ENTMCNC: 34.4 G/DL (ref 31.5–35.7)
MCV RBC AUTO: 87.5 FL (ref 79–97)
MONOCYTES # BLD AUTO: 0.57 10*3/MM3 (ref 0.1–0.9)
MONOCYTES NFR BLD AUTO: 8.1 % (ref 5–12)
NEUTROPHILS # BLD AUTO: 4.41 10*3/MM3 (ref 1.7–7)
NEUTROPHILS NFR BLD AUTO: 62.5 % (ref 42.7–76)
NRBC BLD AUTO-RTO: 0 /100 WBC (ref 0–0.2)
PLATELET # BLD AUTO: 244 10*3/MM3 (ref 140–450)
POTASSIUM SERPL-SCNC: 4.2 MMOL/L (ref 3.5–5.2)
PROT SERPL-MCNC: 6.4 G/DL (ref 6–8.5)
RBC # BLD AUTO: 4.72 10*6/MM3 (ref 4.14–5.8)
SODIUM SERPL-SCNC: 143 MMOL/L (ref 136–145)
TRIGL SERPL-MCNC: 64 MG/DL (ref 0–150)
VLDLC SERPL CALC-MCNC: 14 MG/DL (ref 5–40)
WBC # BLD AUTO: 7.06 10*3/MM3 (ref 3.4–10.8)

## 2023-11-22 ENCOUNTER — OFFICE VISIT (OUTPATIENT)
Dept: INTERNAL MEDICINE | Facility: CLINIC | Age: 48
End: 2023-11-22
Payer: COMMERCIAL

## 2023-11-22 VITALS
TEMPERATURE: 98.2 F | HEART RATE: 67 BPM | SYSTOLIC BLOOD PRESSURE: 122 MMHG | RESPIRATION RATE: 16 BRPM | WEIGHT: 213 LBS | BODY MASS INDEX: 33.43 KG/M2 | HEIGHT: 67 IN | DIASTOLIC BLOOD PRESSURE: 76 MMHG

## 2023-11-22 DIAGNOSIS — I10 BENIGN ESSENTIAL HYPERTENSION: Primary | ICD-10-CM

## 2023-11-22 DIAGNOSIS — R73.01 IMPAIRED FASTING GLUCOSE: ICD-10-CM

## 2023-11-22 DIAGNOSIS — E78.2 MIXED HYPERLIPIDEMIA: ICD-10-CM

## 2023-11-22 DIAGNOSIS — I25.10 CORONARY ARTERY DISEASE INVOLVING NATIVE CORONARY ARTERY OF NATIVE HEART WITHOUT ANGINA PECTORIS: ICD-10-CM

## 2023-11-22 DIAGNOSIS — Z23 NEED FOR INFLUENZA VACCINATION: ICD-10-CM

## 2023-11-22 DIAGNOSIS — L98.9 SKIN LESION OF BACK: ICD-10-CM

## 2023-11-22 RX ORDER — METOPROLOL SUCCINATE 50 MG/1
50 TABLET, EXTENDED RELEASE ORAL DAILY
Qty: 90 TABLET | Refills: 2 | Status: SHIPPED | OUTPATIENT
Start: 2023-11-22

## 2023-11-22 RX ORDER — ATORVASTATIN CALCIUM 80 MG/1
80 TABLET, FILM COATED ORAL NIGHTLY
Qty: 90 TABLET | Refills: 2 | Status: SHIPPED | OUTPATIENT
Start: 2023-11-22

## 2023-11-22 RX ORDER — LOSARTAN POTASSIUM AND HYDROCHLOROTHIAZIDE 12.5; 5 MG/1; MG/1
1 TABLET ORAL DAILY
Qty: 90 TABLET | Refills: 2 | Status: SHIPPED | OUTPATIENT
Start: 2023-11-22

## 2023-11-22 NOTE — PROGRESS NOTES
"Chief Complaint  Hypertension, Hyperlipidemia, and Coronary Artery Disease    Subjective        Zachery Lomeli presents to [facility] PRIMARY [facility]ARE  HPI: Patient is here to follow up on the blood pressure  The patient is taking the blood pressure medications as prescribed and has had no side effects. The patient is also here to follow up on the cholesterol and  is trying to follow a diet. The patient is  also here to follow up sugar and CAD  and lab work done .  He has an allergy to aspirin and/or Plavix and is currently on Effient, the patient also needs refills on medications and flu vaccine.   He complains of lesion on the back which is  growing  Hyperlipidemia   Pertinent negatives include no chest pain or shortness of breath.   Hypertension   Pertinent negatives include no chest pain, palpitations or shortness of breath.    Hypertension        Objective   Vital Signs:  /76   Pulse 67   Temp 98.2 °F (36.8 °C)   Resp 16   Ht 170.2 cm (67.01\")   Wt 96.6 kg (213 lb)   BMI 33.35 kg/m²   Estimated body mass index is 33.35 kg/m² as calculated from the following:    Height as of this encounter: 170.2 cm (67.01\").    Weight as of this encounter: 96.6 kg (213 lb).               Physical Exam  Vitals and nursing note reviewed.   Constitutional:       General: He is not in acute distress.     Appearance: Normal appearance. He is obese. He is not diaphoretic.   HENT:      Head: Normocephalic and atraumatic.      Right Ear: External ear normal.      Left Ear: External ear normal.      Nose: Nose normal.   Eyes:      Extraocular Movements: Extraocular movements intact.      Conjunctiva/sclera: Conjunctivae normal.   Neck:      Trachea: Trachea normal.   Cardiovascular:      Rate and Rhythm: Normal rate and regular rhythm.      Heart sounds: Normal heart sounds.   Pulmonary:      Effort: Pulmonary effort is normal. No respiratory distress.   Abdominal:      General: Abdomen is flat.   Musculoskeletal: "      Cervical back: Neck supple.      Comments: Moves all limbs   Skin:     General: Skin is warm and dry.      Findings: Lesion present. No erythema.   Neurological:      Mental Status: He is alert and oriented to person, place, and time.      Comments: No gross motor or sensory deficits        Result Review :  The following data was reviewed by: Keila Bolivar MD on 11/22/2023:  Common labs          2/24/2023    08:36 6/9/2023    08:37 11/10/2023    08:09   Common Labs   Glucose 101  116  110    BUN 18  13  13    Creatinine 1.13  0.96  0.95    Sodium 145  144  143    Potassium 4.0  4.0  4.2    Chloride 107  105  105    Calcium 9.4  10.0  9.6    Total Protein 6.1  6.5  6.4    Albumin 4.5  4.5  4.6    Total Bilirubin 0.7  1.0  1.1    Alkaline Phosphatase 67  84  92    AST (SGOT) 15  16  20    ALT (SGPT) 28  25  28    WBC 11.64  6.23  7.06    Hemoglobin 13.3  14.6  14.2    Hematocrit 38.2  42.4  41.3    Platelets 233  227  244    Total Cholesterol 111  116  107    Triglycerides 66  64  64    HDL Cholesterol 52  45  41    LDL Cholesterol  45  57  52    Hemoglobin A1C 5.60  5.40  5.70                   Assessment and Plan   Diagnoses and all orders for this visit:    1. Benign essential hypertension (Primary)  -     losartan-hydrochlorothiazide (Hyzaar) 50-12.5 MG per tablet; Take 1 tablet by mouth Daily.  Dispense: 90 tablet; Refill: 2    2. Mixed hyperlipidemia  -     CBC & Differential  -     Comprehensive Metabolic Panel  -     Lipid Panel  -     atorvastatin (LIPITOR) 80 MG tablet; Take 1 tablet by mouth Every Night.  Dispense: 90 tablet; Refill: 2    3. Impaired fasting glucose  -     Hemoglobin A1c    4. Coronary artery disease involving native coronary artery of native heart without angina pectoris  -     metoprolol succinate XL (TOPROL-XL) 50 MG 24 hr tablet; Take 1 tablet by mouth Daily.  Dispense: 90 tablet; Refill: 2    5. Skin lesion of back  -     Ambulatory Referral to Dermatology    6. Need for influenza  vaccination  -     Fluzone >6 Months ([id number])    Plan:  1.  Benign essential hypertension: Will continue current medication, low-sodium diet advised, Counseled to regularly check BP at home with goal averaging <130/80.   2.mixed hyperlipidemia:  reviewed  fasting CMP and lipid panel.  Diet and exercise counseled,  Will continue current medications  3. impaired glucose   :   reviewed  fasting CMP  and hba1c  5.7 , diet and exercise counseled  4. Need for flu vaccine : given today and well tolerated  5.CAD : To continue current medications per cardiology  6.skin lesion : refer to dermatology       Follow Up   Return in about 14 weeks (around 2/28/2024).  Patient was given instructions and counseling regarding his condition or for health maintenance advice. Please see specific information pulled into the AVS if appropriate.

## 2024-02-01 NOTE — PROGRESS NOTES
[facility] Cardiology Office Follow Up Note    Zachery Lomeli  [id number]  2024    Primary Care Provider: Keila Bolivar MD   Referring Provider: No ref. provider found    Chief Complaint: 1yr F/U CAD, HTN    History of Present Illness:   Mr. Zachery Lomeli is a 48 y.o. male who presents to the Cardiology Clinic for follow up of CAD.  The patient has a past medical history of coronary artery disease, hypertension, hyperlipidemia, anxiety, aortic valve sclerosis, and previous tobacco use.  He presents today for routine follow-up of CAD.  Patient recently underwent an angio screen at his place of employment.  The patient reports feeling well from a cardiac standpoint today.  He specifically denies chest pain and dyspnea.  He denies palpitations, dizziness, syncope.  He denies orthopnea, PND, and lower extremity edema.  He does note that he can stand for up to 15 hours a day at his job and may have leg pain, but specifically denies leg pain with ambulation.  He continues to take his medications as prescribed without perceived side effects.  He offers no other complaints or concerns at this time.    Past Cardiac Testin. 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 Prior Stress Testin2021 Remote (scanned into chart)  3. Last Echo: 10/5/2021  Left ventricular wall thickness is consistent with moderate concentric hypertrophy.  Left ventricular diastolic function was normal.  Estimated right ventricular systolic pressure from tricuspid regurgitation is normal (<35 mmHg). Calculated right  "ventricular systolic pressure from tricuspid regurgitation is 26 mmHg.  4. Prior Holter Monitor: None    Review of Systems:   Review of Systems  As Noted in HPI.   I have reviewed and confirmed the accuracy of the ROS as documented by the MA/LPN/RN SHERIN Draper    I have reviewed and/or updated the patient's past medical, past surgical, family, social history, problem list and allergies as appropriate.     Medications:     Current Outpatient Medications:     atorvastatin (LIPITOR) 80 MG tablet, Take 1 tablet by mouth Every Night., Disp: 90 tablet, Rfl: 2    Cholecalciferol (VITAMIN D-3) 1000 UNITS capsule, Take 1,000 Units by mouth Daily., Disp: , Rfl:     diphenhydrAMINE (BENADRYL) 25 MG tablet, Take 1 tablet by mouth Every 6 (Six) Hours As Needed for Itching., Disp: 30 tablet, Rfl: 0    hydrocortisone 2.5 % cream, Apply 1 Application topically to the appropriate area as directed 2 (Two) Times a Day., Disp: , Rfl:     losartan-hydrochlorothiazide (Hyzaar) 50-12.5 MG per tablet, Take 1 tablet by mouth Daily., Disp: 90 tablet, Rfl: 2    metoprolol succinate XL (TOPROL-XL) 50 MG 24 hr tablet, Take 1 tablet by mouth Daily., Disp: 90 tablet, Rfl: 2    prasugrel (EFFIENT) 10 MG tablet, Take 1 tablet by mouth Daily., Disp: 90 tablet, Rfl: 4    vitamin B-12 (CYANOCOBALAMIN) 1000 MCG tablet, Take 1 tablet by mouth Daily., Disp: , Rfl:     Physical Exam:  Vital Signs:   Vitals:    02/07/24 1350   BP: 122/60   BP Location: Right arm   Patient Position: Sitting   Cuff Size: Adult   Pulse: 87   SpO2: 98%   Weight: 98.9 kg (218 lb)   Height: 170.2 cm (67.01\")   PainSc: 0-No pain     Body mass index is 34.14 kg/m².    Physical Exam  Vitals and nursing note reviewed.   Constitutional:       General: He is not in acute distress.     Appearance: He is obese.   HENT:      Head: Normocephalic and atraumatic.   Neck:      Vascular: No carotid bruit.      Trachea: Trachea normal.   Cardiovascular:      Rate and Rhythm: " "Normal rate and regular rhythm.      Pulses: Normal pulses.      Heart sounds: Normal heart sounds. No murmur heard.     No friction rub. No gallop.   Pulmonary:      Effort: Pulmonary effort is normal.      Breath sounds: Normal breath sounds.   Musculoskeletal:      Cervical back: Neck supple.      Right lower leg: No edema.      Left lower leg: No edema.   Skin:     General: Skin is warm and dry.   Neurological:      Mental Status: He is alert and oriented to person, place, and time.   Psychiatric:         Mood and Affect: Mood normal.         Behavior: Behavior normal. Behavior is cooperative.         Thought Content: Thought content does not include suicidal ideation.         Results Review:   I reviewed the patient's new clinical results.    Procedures    Assessment / Plan:   Diagnoses and all orders for this visit:    1. Coronary artery disease involving native coronary artery of native heart without angina pectoris (Primary)  Patient not taking aspirin secondary to allergy/intolerance (causes rash)  Continue Effient and statin therapy    2. Primary hypertension  Acceptable blood pressure control    3. Dyslipidemia  11/23, triglycerides 64, HDL 41, LDL 52  Continue high intensity statin therapy    Mr. Lomeli did bring his angio screen readings (scanned to chart) which demonstrated an AVEL of 0.87 of right leg.  There was also \"significant\" left carotid artery disease per ultrasound.  After discussing this for some time, the patient opted not to undergo repeat or further testing.  Given he has no symptoms that would suggest an issue in this area, he has opted to utilize the \"watch and wait\" strategy.  He has agreed to contact our office if he has any symptoms or issues that would warrant further exploration.  At today's visit, our focus of care is centered around risk-management and routine follow-up.      Preventative Cardiology:   Tobacco Cessation: N/A   Advance Care Planning: ACP discussion was declined by the " patient. Patient does not have an advance directive, declines further assistance.     Follow Up:   Return in about 1 year (around 2/7/2025) for Follow-up with Dr. Elkins.      Thank you for allowing me to participate in the care of your patient. Please do not hesitate to contact me with additional questions or concerns.     SHERIN Fraser

## 2024-02-07 ENCOUNTER — OFFICE VISIT (OUTPATIENT)
Dept: CARDIOLOGY | Facility: CLINIC | Age: 49
End: 2024-02-07
Payer: COMMERCIAL

## 2024-02-07 VITALS
WEIGHT: 218 LBS | BODY MASS INDEX: 34.21 KG/M2 | DIASTOLIC BLOOD PRESSURE: 60 MMHG | OXYGEN SATURATION: 98 % | HEIGHT: 67 IN | HEART RATE: 87 BPM | SYSTOLIC BLOOD PRESSURE: 122 MMHG

## 2024-02-07 DIAGNOSIS — E78.5 DYSLIPIDEMIA: ICD-10-CM

## 2024-02-07 DIAGNOSIS — I25.10 CORONARY ARTERY DISEASE INVOLVING NATIVE CORONARY ARTERY OF NATIVE HEART WITHOUT ANGINA PECTORIS: Primary | ICD-10-CM

## 2024-02-07 DIAGNOSIS — I10 PRIMARY HYPERTENSION: ICD-10-CM

## 2024-02-07 PROCEDURE — 99214 OFFICE O/P EST MOD 30 MIN: CPT | Performed by: NURSE PRACTITIONER

## 2024-03-08 LAB
ALBUMIN SERPL-MCNC: 4.5 G/DL (ref 3.5–5.2)
ALBUMIN/GLOB SERPL: 2.6 G/DL
ALP SERPL-CCNC: 85 U/L (ref 39–117)
ALT SERPL-CCNC: 28 U/L (ref 1–41)
AST SERPL-CCNC: 19 U/L (ref 1–40)
BASOPHILS # BLD AUTO: 0.04 10*3/MM3 (ref 0–0.2)
BASOPHILS NFR BLD AUTO: 0.7 % (ref 0–1.5)
BILIRUB SERPL-MCNC: 0.9 MG/DL (ref 0–1.2)
BUN SERPL-MCNC: 13 MG/DL (ref 6–20)
BUN/CREAT SERPL: 13.3 (ref 7–25)
CALCIUM SERPL-MCNC: 8.9 MG/DL (ref 8.6–10.5)
CHLORIDE SERPL-SCNC: 107 MMOL/L (ref 98–107)
CHOLEST SERPL-MCNC: 115 MG/DL (ref 0–200)
CO2 SERPL-SCNC: 25.8 MMOL/L (ref 22–29)
CREAT SERPL-MCNC: 0.98 MG/DL (ref 0.76–1.27)
EGFRCR SERPLBLD CKD-EPI 2021: 95.1 ML/MIN/1.73
EOSINOPHIL # BLD AUTO: 0.12 10*3/MM3 (ref 0–0.4)
EOSINOPHIL NFR BLD AUTO: 2 % (ref 0.3–6.2)
ERYTHROCYTE [DISTWIDTH] IN BLOOD BY AUTOMATED COUNT: 12.5 % (ref 12.3–15.4)
GLOBULIN SER CALC-MCNC: 1.7 GM/DL
GLUCOSE SERPL-MCNC: 102 MG/DL (ref 65–99)
HBA1C MFR BLD: 5.7 % (ref 4.8–5.6)
HCT VFR BLD AUTO: 42.8 % (ref 37.5–51)
HDLC SERPL-MCNC: 46 MG/DL (ref 40–60)
HGB BLD-MCNC: 14.1 G/DL (ref 13–17.7)
IMM GRANULOCYTES # BLD AUTO: 0.02 10*3/MM3 (ref 0–0.05)
IMM GRANULOCYTES NFR BLD AUTO: 0.3 % (ref 0–0.5)
LDLC SERPL CALC-MCNC: 52 MG/DL (ref 0–100)
LYMPHOCYTES # BLD AUTO: 1.72 10*3/MM3 (ref 0.7–3.1)
LYMPHOCYTES NFR BLD AUTO: 28 % (ref 19.6–45.3)
MCH RBC QN AUTO: 28.4 PG (ref 26.6–33)
MCHC RBC AUTO-ENTMCNC: 32.9 G/DL (ref 31.5–35.7)
MCV RBC AUTO: 86.3 FL (ref 79–97)
MONOCYTES # BLD AUTO: 0.44 10*3/MM3 (ref 0.1–0.9)
MONOCYTES NFR BLD AUTO: 7.2 % (ref 5–12)
NEUTROPHILS # BLD AUTO: 3.81 10*3/MM3 (ref 1.7–7)
NEUTROPHILS NFR BLD AUTO: 61.8 % (ref 42.7–76)
NRBC BLD AUTO-RTO: 0 /100 WBC (ref 0–0.2)
PLATELET # BLD AUTO: 243 10*3/MM3 (ref 140–450)
POTASSIUM SERPL-SCNC: 4.3 MMOL/L (ref 3.5–5.2)
PROT SERPL-MCNC: 6.2 G/DL (ref 6–8.5)
RBC # BLD AUTO: 4.96 10*6/MM3 (ref 4.14–5.8)
SODIUM SERPL-SCNC: 144 MMOL/L (ref 136–145)
TRIGL SERPL-MCNC: 87 MG/DL (ref 0–150)
VLDLC SERPL CALC-MCNC: 17 MG/DL (ref 5–40)
WBC # BLD AUTO: 6.15 10*3/MM3 (ref 3.4–10.8)

## 2024-03-13 ENCOUNTER — OFFICE VISIT (OUTPATIENT)
Dept: INTERNAL MEDICINE | Facility: CLINIC | Age: 49
End: 2024-03-13
Payer: COMMERCIAL

## 2024-03-13 VITALS
OXYGEN SATURATION: 98 % | RESPIRATION RATE: 18 BRPM | TEMPERATURE: 98.4 F | DIASTOLIC BLOOD PRESSURE: 66 MMHG | WEIGHT: 221 LBS | SYSTOLIC BLOOD PRESSURE: 106 MMHG | BODY MASS INDEX: 34.69 KG/M2 | HEART RATE: 62 BPM | HEIGHT: 67 IN

## 2024-03-13 DIAGNOSIS — I10 BENIGN ESSENTIAL HYPERTENSION: Primary | ICD-10-CM

## 2024-03-13 DIAGNOSIS — I25.10 CORONARY ARTERY DISEASE INVOLVING NATIVE CORONARY ARTERY OF NATIVE HEART WITHOUT ANGINA PECTORIS: ICD-10-CM

## 2024-03-13 DIAGNOSIS — E78.2 MIXED HYPERLIPIDEMIA: ICD-10-CM

## 2024-03-13 DIAGNOSIS — R73.01 IMPAIRED FASTING GLUCOSE: ICD-10-CM

## 2024-03-13 DIAGNOSIS — M65.342 TRIGGER RING FINGER OF LEFT HAND: ICD-10-CM

## 2024-03-13 DIAGNOSIS — R42 DIZZY: ICD-10-CM

## 2024-03-13 PROCEDURE — 99214 OFFICE O/P EST MOD 30 MIN: CPT | Performed by: INTERNAL MEDICINE

## 2024-03-13 RX ORDER — METHYLPREDNISOLONE 4 MG/1
TABLET ORAL
Qty: 21 TABLET | Refills: 0 | Status: SHIPPED | OUTPATIENT
Start: 2024-03-13

## 2024-03-13 RX ORDER — LOSARTAN POTASSIUM AND HYDROCHLOROTHIAZIDE 12.5; 5 MG/1; MG/1
0.5 TABLET ORAL DAILY
Qty: 90 TABLET | Refills: 1
Start: 2024-03-13

## 2024-03-13 NOTE — PROGRESS NOTES
"Chief Complaint  Hypertension (Follow up on blood work), Hyperlipidemia, and Pain    Subjective        Zachery Lomeli presents to [facility]  HPI: Patient is here to follow up on the blood pressure which is running on the lower side ,the patient is taking the blood pressure medications as prescribed but has been feeling dizzy when he changes positions, he was seen by cardiology and his records have been reviewed, he has a rash to aspirin and was advised to continue Effient, the patient is also here to follow up on the cholesterol and  had  lab work done .  The patient also needs refills on medications .  Patient is also here complaining of ringing finger on the right hand getting locked and now has pain and unable to grasp his right hand completely  Hyperlipidemia   Pertinent negatives include no chest pain or shortness of breath.   Hypertension   Pertinent negatives include no chest pain, palpitations or shortness of breath.      Objective   Vital Signs:  /66   Pulse 62   Temp 98.4 °F (36.9 °C)   Resp 18   Ht 170.2 cm (67.01\")   Wt 100 kg (221 lb)   SpO2 98%   BMI 34.61 kg/m²   Estimated body mass index is 34.61 kg/m² as calculated from the following:    Height as of this encounter: 170.2 cm (67.01\").    Weight as of this encounter: 100 kg (221 lb).       BMI is >= 30 and <35. (Class 1 Obesity). The following options were offered after discussion;: weight loss educational material (shared in after visit summary), exercise counseling/recommendations, and nutrition counseling/recommendations      Physical Exam  Vitals and nursing note reviewed.   Constitutional:       General: He is not in acute distress.     Appearance: Normal appearance. He is obese. He is not diaphoretic.   HENT:      Head: Normocephalic and atraumatic.      Right Ear: External ear normal.      Left Ear: External ear normal.      Nose: Nose normal. Rhinorrhea present.   Eyes:      Extraocular Movements: " Extraocular movements intact.      Conjunctiva/sclera: Conjunctivae normal.   Neck:      Trachea: Trachea normal.   Cardiovascular:      Rate and Rhythm: Normal rate and regular rhythm.      Heart sounds: Normal heart sounds.   Pulmonary:      Effort: Pulmonary effort is normal. No respiratory distress.   Abdominal:      General: Abdomen is flat.   Musculoskeletal:         General: Tenderness and deformity present.      Cervical back: Neck supple.      Comments: Right hand ring finger trigger finger  Moves all limbs   Skin:     General: Skin is warm and dry.      Findings: No erythema.   Neurological:      Mental Status: He is alert and oriented to person, place, and time.      Comments: No gross motor or sensory deficits        Result Review :    The following data was reviewed by: Keila Bolivar MD on 03/13/2024:  Common labs          6/9/2023    08:37 11/10/2023    08:09 3/8/2024    08:31   Common Labs   Glucose 116  110  102    BUN 13  13  13    Creatinine 0.96  0.95  0.98    Sodium 144  143  144    Potassium 4.0  4.2  4.3    Chloride 105  105  107    Calcium 10.0  9.6  8.9    Total Protein 6.5  6.4  6.2    Albumin 4.5  4.6  4.5    Total Bilirubin 1.0  1.1  0.9    Alkaline Phosphatase 84  92  85    AST (SGOT) 16  20  19    ALT (SGPT) 25  28  28    WBC 6.23  7.06  6.15    Hemoglobin 14.6  14.2  14.1    Hematocrit 42.4  41.3  42.8    Platelets 227  244  243    Total Cholesterol 116  107  115    Triglycerides 64  64  87    HDL Cholesterol 45  41  46    LDL Cholesterol  57  52  52    Hemoglobin A1C 5.40  5.70  5.70                   Assessment and Plan     Diagnoses and all orders for this visit:    1. Benign essential hypertension (Primary)  -     losartan-hydrochlorothiazide (Hyzaar) 50-12.5 MG per tablet; Take 0.5 tablets by mouth Daily.  Dispense: 90 tablet; Refill: 1    2. Mixed hyperlipidemia  -     CBC (No Diff)  -     Comprehensive Metabolic Panel  -     Lipid Panel    3. Impaired fasting glucose  -      Hemoglobin A1c    4. Coronary artery disease involving native coronary artery of native heart without angina pectoris  -     Lipid Panel    5. Dizzy    6. Trigger ring finger of left hand  -     methylPREDNISolone (MEDROL) 4 MG dose pack; Take as directed on package instructions.  Dispense: 21 tablet; Refill: 0    Plan:  1.  Benign essential hypertension: Will continue current medication -carvedilol and decrease to losartan hydrochlorothiazide half pill daily for better blood pressure control low-sodium diet advised, Counseled to regularly check BP at home with goal averaging <130/80.   2.mixed hyperlipidemia:  reviewed  fasting CMP and lipid panel.  Diet and exercise counseled,  Will continue current medications  3. impaired glucose   :   reviewed  fasting CMP  and hba1c 5.7, diet and exercise counseled  4.  Cad: Seen by cardiology, to continue with Effient and Lipitor and metoprolol  5.  Dizzy: Monitor vitals, oral hydration  6.  Right hand ring finger trigger finger: Will start Medrol Dosepak         Follow Up     Return in about 3 weeks (around 4/3/2024).  Patient was given instructions and counseling regarding his condition or for health maintenance advice. Please see specific information pulled into the AVS if appropriate.

## 2024-03-19 RX ORDER — PRASUGREL 10 MG/1
10 TABLET, FILM COATED ORAL DAILY
Qty: 90 TABLET | Refills: 3 | Status: SHIPPED | OUTPATIENT
Start: 2024-03-19

## 2024-04-03 ENCOUNTER — OFFICE VISIT (OUTPATIENT)
Dept: INTERNAL MEDICINE | Facility: CLINIC | Age: 49
End: 2024-04-03
Payer: COMMERCIAL

## 2024-04-03 VITALS
DIASTOLIC BLOOD PRESSURE: 74 MMHG | SYSTOLIC BLOOD PRESSURE: 112 MMHG | BODY MASS INDEX: 35.16 KG/M2 | HEIGHT: 67 IN | OXYGEN SATURATION: 97 % | RESPIRATION RATE: 18 BRPM | HEART RATE: 67 BPM | WEIGHT: 224 LBS | TEMPERATURE: 98.2 F

## 2024-04-03 DIAGNOSIS — R42 DIZZY: ICD-10-CM

## 2024-04-03 DIAGNOSIS — I10 BENIGN ESSENTIAL HYPERTENSION: Primary | ICD-10-CM

## 2024-04-03 PROCEDURE — 99213 OFFICE O/P EST LOW 20 MIN: CPT | Performed by: INTERNAL MEDICINE

## 2024-04-03 NOTE — PROGRESS NOTES
"Chief Complaint  Hypertension (Follow up on) and Dizziness    Subjective        Zachery Lomeli presents to [facility]  History of Present Illness  Patient is here to follow-up on his blood pressure, his blood pressure medications were readjusted at the last visit and he has been able to tolerate it better and his dizziness has improved, he has been monitoring his blood pressure readings at home, he did not take the steroids for his trigger finger but has been trying some home remedies      Objective   Vital Signs:  /74   Pulse 67   Temp 98.2 °F (36.8 °C)   Resp 18   Ht 170.2 cm (67.01\")   Wt 102 kg (224 lb)   SpO2 97%   BMI 35.07 kg/m²   Estimated body mass index is 35.07 kg/m² as calculated from the following:    Height as of this encounter: 170.2 cm (67.01\").    Weight as of this encounter: 102 kg (224 lb).               Physical Exam  Vitals and nursing note reviewed.   Constitutional:       General: He is not in acute distress.     Appearance: Normal appearance. He is not diaphoretic.   HENT:      Head: Normocephalic and atraumatic.      Right Ear: External ear normal.      Left Ear: External ear normal.      Nose: Nose normal.   Eyes:      Extraocular Movements: Extraocular movements intact.      Conjunctiva/sclera: Conjunctivae normal.   Neck:      Trachea: Trachea normal.   Cardiovascular:      Rate and Rhythm: Normal rate and regular rhythm.      Heart sounds: Normal heart sounds.   Pulmonary:      Effort: Pulmonary effort is normal. No respiratory distress.   Abdominal:      General: Abdomen is flat.   Musculoskeletal:      Cervical back: Neck supple.      Comments: Moves all limbs   Skin:     General: Skin is warm and dry.      Findings: No erythema.   Neurological:      Mental Status: He is alert and oriented to person, place, and time.      Comments: No gross motor or sensory deficits        Result Review :    The following data was reviewed by: Keila Bolivar MD on " 04/03/2024:  CMP          6/9/2023    08:37 11/10/2023    08:09 3/8/2024    08:31   CMP   Glucose 116  110  102    BUN 13  13  13    Creatinine 0.96  0.95  0.98    Sodium 144  143  144    Potassium 4.0  4.2  4.3    Chloride 105  105  107    Calcium 10.0  9.6  8.9    Total Protein 6.5  6.4  6.2    Albumin 4.5  4.6  4.5    Globulin 2.0  1.8  1.7    Total Bilirubin 1.0  1.1  0.9    Alkaline Phosphatase 84  92  85    AST (SGOT) 16  20  19    ALT (SGPT) 25  28  28    BUN/Creatinine Ratio 13.5  13.7  13.3                   Assessment and Plan     Diagnoses and all orders for this visit:    1. Benign essential hypertension (Primary)    2. Dizzy        Plan:  1.  Benign essential hypertension: Will continue current medication, low-sodium diet advised, Counseled to regularly check BP at home with goal averaging <130/80.   2.dizziness: Improved, oral hydration, monitor BP, labs reviewed         Follow Up     No follow-ups on file.  Patient was given instructions and counseling regarding his condition or for health maintenance advice. Please see specific information pulled into the AVS if appropriate.

## 2024-04-21 DIAGNOSIS — I25.10 CORONARY ARTERY DISEASE INVOLVING NATIVE CORONARY ARTERY OF NATIVE HEART WITHOUT ANGINA PECTORIS: ICD-10-CM

## 2024-04-21 DIAGNOSIS — I10 BENIGN ESSENTIAL HYPERTENSION: ICD-10-CM

## 2024-04-22 RX ORDER — METOPROLOL SUCCINATE 50 MG/1
50 TABLET, EXTENDED RELEASE ORAL DAILY
Qty: 90 TABLET | Refills: 2 | Status: SHIPPED | OUTPATIENT
Start: 2024-04-22

## 2024-04-22 RX ORDER — LOSARTAN POTASSIUM AND HYDROCHLOROTHIAZIDE 12.5; 5 MG/1; MG/1
0.5 TABLET ORAL DAILY
Qty: 90 TABLET | Refills: 1
Start: 2024-04-22

## 2024-07-25 LAB
ALBUMIN SERPL-MCNC: 4.4 G/DL (ref 3.5–5.2)
ALBUMIN/GLOB SERPL: 2.4 G/DL
ALP SERPL-CCNC: 84 U/L (ref 39–117)
ALT SERPL-CCNC: 29 U/L (ref 1–41)
AST SERPL-CCNC: 17 U/L (ref 1–40)
BILIRUB SERPL-MCNC: 0.7 MG/DL (ref 0–1.2)
BUN SERPL-MCNC: 12 MG/DL (ref 6–20)
BUN/CREAT SERPL: 11.9 (ref 7–25)
CALCIUM SERPL-MCNC: 9.4 MG/DL (ref 8.6–10.5)
CHLORIDE SERPL-SCNC: 108 MMOL/L (ref 98–107)
CHOLEST SERPL-MCNC: 136 MG/DL (ref 0–200)
CO2 SERPL-SCNC: 27.6 MMOL/L (ref 22–29)
CREAT SERPL-MCNC: 1.01 MG/DL (ref 0.76–1.27)
EGFRCR SERPLBLD CKD-EPI 2021: 91.2 ML/MIN/1.73
ERYTHROCYTE [DISTWIDTH] IN BLOOD BY AUTOMATED COUNT: 12.6 % (ref 12.3–15.4)
GLOBULIN SER CALC-MCNC: 1.8 GM/DL
GLUCOSE SERPL-MCNC: 114 MG/DL (ref 65–99)
HBA1C MFR BLD: 5.8 % (ref 4.8–5.6)
HCT VFR BLD AUTO: 41.1 % (ref 37.5–51)
HDLC SERPL-MCNC: 50 MG/DL (ref 40–60)
HGB BLD-MCNC: 14.1 G/DL (ref 13–17.7)
LDLC SERPL CALC-MCNC: 71 MG/DL (ref 0–100)
MCH RBC QN AUTO: 30.2 PG (ref 26.6–33)
MCHC RBC AUTO-ENTMCNC: 34.3 G/DL (ref 31.5–35.7)
MCV RBC AUTO: 88 FL (ref 79–97)
PLATELET # BLD AUTO: 243 10*3/MM3 (ref 140–450)
POTASSIUM SERPL-SCNC: 4.1 MMOL/L (ref 3.5–5.2)
PROT SERPL-MCNC: 6.2 G/DL (ref 6–8.5)
RBC # BLD AUTO: 4.67 10*6/MM3 (ref 4.14–5.8)
SODIUM SERPL-SCNC: 143 MMOL/L (ref 136–145)
TRIGL SERPL-MCNC: 75 MG/DL (ref 0–150)
VLDLC SERPL CALC-MCNC: 15 MG/DL (ref 5–40)
WBC # BLD AUTO: 6.76 10*3/MM3 (ref 3.4–10.8)

## 2024-07-30 ENCOUNTER — OFFICE VISIT (OUTPATIENT)
Dept: INTERNAL MEDICINE | Facility: CLINIC | Age: 49
End: 2024-07-30
Payer: COMMERCIAL

## 2024-07-30 VITALS
SYSTOLIC BLOOD PRESSURE: 111 MMHG | WEIGHT: 222 LBS | OXYGEN SATURATION: 98 % | HEART RATE: 62 BPM | BODY MASS INDEX: 34.84 KG/M2 | TEMPERATURE: 98.2 F | DIASTOLIC BLOOD PRESSURE: 72 MMHG | RESPIRATION RATE: 20 BRPM | HEIGHT: 67 IN

## 2024-07-30 DIAGNOSIS — R73.01 IMPAIRED FASTING GLUCOSE: ICD-10-CM

## 2024-07-30 DIAGNOSIS — E78.2 MIXED HYPERLIPIDEMIA: ICD-10-CM

## 2024-07-30 DIAGNOSIS — I25.10 CORONARY ARTERY DISEASE INVOLVING NATIVE CORONARY ARTERY OF NATIVE HEART WITHOUT ANGINA PECTORIS: ICD-10-CM

## 2024-07-30 DIAGNOSIS — I10 BENIGN ESSENTIAL HYPERTENSION: Primary | ICD-10-CM

## 2024-07-30 PROCEDURE — 99214 OFFICE O/P EST MOD 30 MIN: CPT | Performed by: INTERNAL MEDICINE

## 2024-07-30 RX ORDER — ATORVASTATIN CALCIUM 80 MG/1
80 TABLET, FILM COATED ORAL NIGHTLY
Qty: 90 TABLET | Refills: 1 | Status: SHIPPED | OUTPATIENT
Start: 2024-07-30

## 2024-07-30 RX ORDER — LOSARTAN POTASSIUM AND HYDROCHLOROTHIAZIDE 12.5; 5 MG/1; MG/1
0.5 TABLET ORAL DAILY
Qty: 45 TABLET | Refills: 1 | Status: SHIPPED | OUTPATIENT
Start: 2024-07-30

## 2024-07-30 RX ORDER — METOPROLOL SUCCINATE 50 MG/1
50 TABLET, EXTENDED RELEASE ORAL DAILY
Qty: 90 TABLET | Refills: 1 | Status: SHIPPED | OUTPATIENT
Start: 2024-07-30

## 2024-07-30 NOTE — PROGRESS NOTES
"Chief Complaint  Hypertension (Follow up on lab results) and Hyperlipidemia    Subjective        Zachery Lomeli presents to [facility]  History of Present Illness  HPI: Patient is here to follow up on the blood pressure  The patient is taking the blood pressure medications as prescribed and has had no side effects. The patient is also here to follow up on the cholesterol and  had  lab work done .  The patient also is here to follow-up on CAD and has seen cardiology, he cannot take aspirin due to rash and is currently on Effient, he needs refills on medications .   Hyperlipidemia   Pertinent negatives include no chest pain or shortness of breath.   Hypertension   Pertinent negatives include no chest pain, palpitations or shortness of breath.    Objective   Vital Signs:  /72   Pulse 62   Temp 98.2 °F (36.8 °C)   Resp 20   Ht 170.2 cm (67.01\")   Wt 101 kg (222 lb)   SpO2 98%   BMI 34.76 kg/m²   Estimated body mass index is 34.76 kg/m² as calculated from the following:    Height as of this encounter: 170.2 cm (67.01\").    Weight as of this encounter: 101 kg (222 lb).               Physical Exam  Vitals and nursing note reviewed.   Constitutional:       General: He is not in acute distress.     Appearance: Normal appearance. He is not diaphoretic.   HENT:      Head: Normocephalic and atraumatic.      Right Ear: External ear normal.      Left Ear: External ear normal.      Nose: Nose normal.   Eyes:      Extraocular Movements: Extraocular movements intact.      Conjunctiva/sclera: Conjunctivae normal.   Neck:      Trachea: Trachea normal.   Cardiovascular:      Rate and Rhythm: Normal rate and regular rhythm.      Heart sounds: Normal heart sounds.   Pulmonary:      Effort: Pulmonary effort is normal. No respiratory distress.   Abdominal:      General: Abdomen is flat.   Musculoskeletal:      Cervical back: Neck supple.      Comments: Moves all limbs   Skin:     General: Skin is " warm and dry.      Findings: No erythema.   Neurological:      Mental Status: He is alert and oriented to person, place, and time.      Comments: No gross motor or sensory deficits        Result Review :    The following data was reviewed by: Keila Bolivar MD on 07/30/2024:  Common labs          11/10/2023    08:09 3/8/2024    08:31 7/25/2024    08:54   Common Labs   Glucose 110  102  114    BUN 13  13  12    Creatinine 0.95  0.98  1.01    Sodium 143  144  143    Potassium 4.2  4.3  4.1    Chloride 105  107  108    Calcium 9.6  8.9  9.4    Total Protein 6.4  6.2  6.2    Albumin 4.6  4.5  4.4    Total Bilirubin 1.1  0.9  0.7    Alkaline Phosphatase 92  85  84    AST (SGOT) 20  19  17    ALT (SGPT) 28  28  29    WBC 7.06  6.15  6.76    Hemoglobin 14.2  14.1  14.1    Hematocrit 41.3  42.8  41.1    Platelets 244  243  243    Total Cholesterol 107  115  136    Triglycerides 64  87  75    HDL Cholesterol 41  46  50    LDL Cholesterol  52  52  71    Hemoglobin A1C 5.70  5.70  5.80                   Assessment and Plan     Diagnoses and all orders for this visit:    1. Benign essential hypertension (Primary)  -     losartan-hydrochlorothiazide (Hyzaar) 50-12.5 MG per tablet; Take 0.5 tablets by mouth Daily.  Dispense: 45 tablet; Refill: 1    2. Mixed hyperlipidemia  -     atorvastatin (LIPITOR) 80 MG tablet; Take 1 tablet by mouth Every Night.  Dispense: 90 tablet; Refill: 1  -     CBC (No Diff)  -     Comprehensive Metabolic Panel  -     Lipid Panel    3. Impaired fasting glucose  -     Hemoglobin A1c    4. Coronary artery disease involving native coronary artery of native heart without angina pectoris  -     metoprolol succinate XL (TOPROL-XL) 50 MG 24 hr tablet; Take 1 tablet by mouth Daily.  Dispense: 90 tablet; Refill: 1    Plan:  1.  Benign essential hypertension: Will continue current medication, low-sodium diet advised, Counseled to regularly check BP at home with goal averaging <130/80.   2.mixed hyperlipidemia:   reviewed  fasting CMP and lipid panel.  Diet and exercise counseled,  Will continue current medications  3. impaired glucose   :   reviewed  fasting CMP  and hba1c 5.8 , diet and exercise counseled  4.  CAD : To continue current medications, follows up with cardiology           Follow Up     Return in about 5 months (around 12/16/2024).  Patient was given instructions and counseling regarding his condition or for health maintenance advice. Please see specific information pulled into the AVS if appropriate.

## 2024-12-05 DIAGNOSIS — I10 BENIGN ESSENTIAL HYPERTENSION: ICD-10-CM

## 2024-12-05 RX ORDER — LOSARTAN POTASSIUM AND HYDROCHLOROTHIAZIDE 12.5; 5 MG/1; MG/1
0.5 TABLET ORAL DAILY
Qty: 45 TABLET | Refills: 1 | OUTPATIENT
Start: 2024-12-05

## 2024-12-05 RX ORDER — LOSARTAN POTASSIUM AND HYDROCHLOROTHIAZIDE 12.5; 5 MG/1; MG/1
1 TABLET ORAL DAILY
Qty: 90 TABLET | Refills: 0 | Status: SHIPPED | OUTPATIENT
Start: 2024-12-05

## 2024-12-11 LAB
ALBUMIN SERPL-MCNC: 4.3 G/DL (ref 3.5–5.2)
ALBUMIN/GLOB SERPL: 2 G/DL
ALP SERPL-CCNC: 90 U/L (ref 39–117)
ALT SERPL-CCNC: 29 U/L (ref 1–41)
AST SERPL-CCNC: 20 U/L (ref 1–40)
BILIRUB SERPL-MCNC: 0.8 MG/DL (ref 0–1.2)
BUN SERPL-MCNC: 14 MG/DL (ref 6–20)
BUN/CREAT SERPL: 13.7 (ref 7–25)
CALCIUM SERPL-MCNC: 9.4 MG/DL (ref 8.6–10.5)
CHLORIDE SERPL-SCNC: 106 MMOL/L (ref 98–107)
CHOLEST SERPL-MCNC: 126 MG/DL (ref 0–200)
CO2 SERPL-SCNC: 27 MMOL/L (ref 22–29)
CREAT SERPL-MCNC: 1.02 MG/DL (ref 0.76–1.27)
EGFRCR SERPLBLD CKD-EPI 2021: 90.1 ML/MIN/1.73
ERYTHROCYTE [DISTWIDTH] IN BLOOD BY AUTOMATED COUNT: 12.7 % (ref 12.3–15.4)
GLOBULIN SER CALC-MCNC: 2.1 GM/DL
GLUCOSE SERPL-MCNC: 104 MG/DL (ref 65–99)
HBA1C MFR BLD: 5.5 % (ref 4.8–5.6)
HCT VFR BLD AUTO: 43.3 % (ref 37.5–51)
HDLC SERPL-MCNC: 43 MG/DL (ref 40–60)
HGB BLD-MCNC: 14.1 G/DL (ref 13–17.7)
LDLC SERPL CALC-MCNC: 66 MG/DL (ref 0–100)
MCH RBC QN AUTO: 29.1 PG (ref 26.6–33)
MCHC RBC AUTO-ENTMCNC: 32.6 G/DL (ref 31.5–35.7)
MCV RBC AUTO: 89.5 FL (ref 79–97)
PLATELET # BLD AUTO: 229 10*3/MM3 (ref 140–450)
POTASSIUM SERPL-SCNC: 4.2 MMOL/L (ref 3.5–5.2)
PROT SERPL-MCNC: 6.4 G/DL (ref 6–8.5)
RBC # BLD AUTO: 4.84 10*6/MM3 (ref 4.14–5.8)
SODIUM SERPL-SCNC: 143 MMOL/L (ref 136–145)
TRIGL SERPL-MCNC: 87 MG/DL (ref 0–150)
VLDLC SERPL CALC-MCNC: 17 MG/DL (ref 5–40)
WBC # BLD AUTO: 8.47 10*3/MM3 (ref 3.4–10.8)

## 2024-12-17 ENCOUNTER — OFFICE VISIT (OUTPATIENT)
Dept: INTERNAL MEDICINE | Facility: CLINIC | Age: 49
End: 2024-12-17
Payer: COMMERCIAL

## 2024-12-17 VITALS
HEIGHT: 67 IN | SYSTOLIC BLOOD PRESSURE: 117 MMHG | BODY MASS INDEX: 34.97 KG/M2 | RESPIRATION RATE: 14 BRPM | HEART RATE: 59 BPM | TEMPERATURE: 98.6 F | WEIGHT: 222.8 LBS | OXYGEN SATURATION: 98 % | DIASTOLIC BLOOD PRESSURE: 63 MMHG

## 2024-12-17 DIAGNOSIS — R42 VERTIGO: ICD-10-CM

## 2024-12-17 DIAGNOSIS — R73.01 IMPAIRED FASTING GLUCOSE: ICD-10-CM

## 2024-12-17 DIAGNOSIS — E78.2 MIXED HYPERLIPIDEMIA: ICD-10-CM

## 2024-12-17 DIAGNOSIS — I25.10 CORONARY ARTERY DISEASE INVOLVING NATIVE CORONARY ARTERY OF NATIVE HEART WITHOUT ANGINA PECTORIS: ICD-10-CM

## 2024-12-17 DIAGNOSIS — I10 BENIGN ESSENTIAL HYPERTENSION: Primary | ICD-10-CM

## 2024-12-17 PROCEDURE — 99214 OFFICE O/P EST MOD 30 MIN: CPT | Performed by: INTERNAL MEDICINE

## 2024-12-17 RX ORDER — ATORVASTATIN CALCIUM 80 MG/1
80 TABLET, FILM COATED ORAL NIGHTLY
Qty: 90 TABLET | Refills: 1 | Status: SHIPPED | OUTPATIENT
Start: 2024-12-17

## 2024-12-17 RX ORDER — LOSARTAN POTASSIUM AND HYDROCHLOROTHIAZIDE 12.5; 5 MG/1; MG/1
0.5 TABLET ORAL DAILY
Qty: 45 TABLET | Refills: 2 | Status: SHIPPED | OUTPATIENT
Start: 2024-12-17

## 2024-12-17 RX ORDER — MECLIZINE HYDROCHLORIDE 25 MG/1
25 TABLET ORAL 3 TIMES DAILY PRN
Qty: 30 TABLET | Refills: 4 | Status: SHIPPED | OUTPATIENT
Start: 2024-12-17

## 2024-12-17 RX ORDER — METOPROLOL SUCCINATE 50 MG/1
50 TABLET, EXTENDED RELEASE ORAL DAILY
Qty: 90 TABLET | Refills: 1 | Status: SHIPPED | OUTPATIENT
Start: 2024-12-17

## 2024-12-17 NOTE — PROGRESS NOTES
"Chief Complaint  Hypertension ( ) and Hyperlipidemia    Subjective        Zachery Lomeli presents to [facility]  HPI: Patient is here to follow up on the blood pressure  The patient is taking the blood pressure medications as prescribed and has had no side effects. The patient is also here to follow up on the cholesterol and is trying to follow a diet and had  lab work done .  The patient also is following up on CAD and sees cardiology in a few months and needs refills on medications .  He states last month he had a bout of vertigo but has now recovered  Hyperlipidemia   Pertinent negatives include no chest pain or shortness of breath.   Hypertension   Pertinent negatives include no chest pain, palpitations or shortness of breath.      Objective   Vital Signs:  /63 (BP Location: Left arm, Patient Position: Sitting, Cuff Size: Adult)   Pulse 59   Temp 98.6 °F (37 °C) (Temporal)   Resp 14   Ht 170.2 cm (67\") Comment: patient reported  Wt 101 kg (222 lb 12.8 oz)   SpO2 98%   BMI 34.90 kg/m²   Estimated body mass index is 34.9 kg/m² as calculated from the following:    Height as of this encounter: 170.2 cm (67\").    Weight as of this encounter: 101 kg (222 lb 12.8 oz).            Physical Exam  Vitals and nursing note reviewed.   Constitutional:       General: He is not in acute distress.     Appearance: Normal appearance. He is not diaphoretic.   HENT:      Head: Normocephalic and atraumatic.      Right Ear: External ear normal.      Left Ear: External ear normal.      Nose: Nose normal.   Eyes:      Extraocular Movements: Extraocular movements intact.      Conjunctiva/sclera: Conjunctivae normal.   Neck:      Trachea: Trachea normal.   Cardiovascular:      Rate and Rhythm: Normal rate and regular rhythm.      Heart sounds: Normal heart sounds.   Pulmonary:      Effort: Pulmonary effort is normal. No respiratory distress.   Abdominal:      General: Abdomen is flat. "   Musculoskeletal:      Cervical back: Neck supple.      Comments: Moves all limbs   Skin:     General: Skin is warm and dry.      Findings: No erythema.   Neurological:      Mental Status: He is alert and oriented to person, place, and time.      Comments: No gross motor or sensory deficits        Result Review :  The following data was reviewed by: Keila Bolivar MD on 12/17/2024:  Common labs          3/8/2024    08:31 7/25/2024    08:54 12/10/2024    08:09   Common Labs   Glucose 102  114  104    BUN 13  12  14    Creatinine 0.98  1.01  1.02    Sodium 144  143  143    Potassium 4.3  4.1  4.2    Chloride 107  108  106    Calcium 8.9  9.4  9.4    Total Protein 6.2  6.2  6.4    Albumin 4.5  4.4  4.3    Total Bilirubin 0.9  0.7  0.8    Alkaline Phosphatase 85  84  90    AST (SGOT) 19  17  20    ALT (SGPT) 28  29  29    WBC 6.15  6.76  8.47    Hemoglobin 14.1  14.1  14.1    Hematocrit 42.8  41.1  43.3    Platelets 243  243  229    Total Cholesterol 115  136  126    Triglycerides 87  75  87    HDL Cholesterol 46  50  43    LDL Cholesterol  52  71  66    Hemoglobin A1C 5.70  5.80  5.50                Assessment and Plan   Diagnoses and all orders for this visit:    1. Benign essential hypertension (Primary)  -     losartan-hydrochlorothiazide (HYZAAR) 50-12.5 MG per tablet; Take 0.5 tablets by mouth Daily.  Dispense: 45 tablet; Refill: 2  -     metoprolol succinate XL (TOPROL-XL) 50 MG 24 hr tablet; Take 1 tablet by mouth Daily.  Dispense: 90 tablet; Refill: 1    2. Coronary artery disease involving native coronary artery of native heart without angina pectoris  -     metoprolol succinate XL (TOPROL-XL) 50 MG 24 hr tablet; Take 1 tablet by mouth Daily.  Dispense: 90 tablet; Refill: 1    3. Mixed hyperlipidemia  -     atorvastatin (LIPITOR) 80 MG tablet; Take 1 tablet by mouth Every Night.  Dispense: 90 tablet; Refill: 1  -     CBC (No Diff)  -     Comprehensive Metabolic Panel  -     Lipid Panel    4. Impaired fasting  glucose  -     Hemoglobin A1c    5. Vertigo  -     meclizine (ANTIVERT) 25 MG tablet; Take 1 tablet by mouth 3 (Three) Times a Day As Needed for Dizziness.  Dispense: 30 tablet; Refill: 4    Plan:  1.  Benign essential hypertension: Will continue current medication, low-sodium diet advised, Counseled to regularly check BP at home with goal averaging <130/80.   2.mixed hyperlipidemia:  reviewed  fasting CMP and lipid panel.  Diet and exercise counseled,  Will continue current medications  3. impaired glucose   :   reviewed  fasting CMP  and hba1c  5.5 , diet and exercise counseled  4.  CAD: Continue current medication, labs reviewed, follow-up with cardiology  5.Vertigo: As needed meclizine, oral hydration advised           Follow Up   Return in about 4 months (around 4/24/2025).  Patient was given instructions and counseling regarding his condition or for health maintenance advice. Please see specific information pulled into the AVS if appropriate.

## 2025-02-03 ENCOUNTER — OFFICE VISIT (OUTPATIENT)
Dept: CARDIOLOGY | Facility: CLINIC | Age: 50
End: 2025-02-03
Payer: COMMERCIAL

## 2025-02-03 VITALS
HEART RATE: 76 BPM | SYSTOLIC BLOOD PRESSURE: 124 MMHG | HEIGHT: 67 IN | OXYGEN SATURATION: 97 % | BODY MASS INDEX: 34.88 KG/M2 | WEIGHT: 222.2 LBS | DIASTOLIC BLOOD PRESSURE: 74 MMHG

## 2025-02-03 DIAGNOSIS — Q24.5 ANOMALOUS ORIGIN OF CORONARY ARTERY: ICD-10-CM

## 2025-02-03 DIAGNOSIS — E66.811 OBESITY (BMI 30.0-34.9): ICD-10-CM

## 2025-02-03 DIAGNOSIS — I10 BENIGN ESSENTIAL HYPERTENSION: ICD-10-CM

## 2025-02-03 DIAGNOSIS — I25.10 CORONARY ARTERY DISEASE INVOLVING NATIVE CORONARY ARTERY OF NATIVE HEART WITHOUT ANGINA PECTORIS: Primary | ICD-10-CM

## 2025-02-03 DIAGNOSIS — E78.5 DYSLIPIDEMIA: ICD-10-CM

## 2025-02-03 PROCEDURE — 99213 OFFICE O/P EST LOW 20 MIN: CPT | Performed by: INTERNAL MEDICINE

## 2025-02-03 NOTE — PROGRESS NOTES
Subjective:     Encounter Date:02/03/2025      Patient ID: Zachery Lomeli is a 49 y.o. male.    Chief Complaint: Coronary artery disease  HPI  This is a 49-year-old male patient who presents to cardiology clinic for routine follow-up.  Since his last visit he has had no active cardiovascular issues, symptoms or hospitalizations.  He remains a reformed smoker since 2018.  He reports compliance with his medications with no perceived side effects.  The following portions of the patient's history were reviewed and updated as appropriate: allergies, current medications, past family history, past medical history, past social history, past surgical history and problem  Review of Systems   Constitutional: Negative for chills, diaphoresis, fever, malaise/fatigue, weight gain and weight loss.   HENT:  Negative for ear discharge, hearing loss, hoarse voice and nosebleeds.    Eyes:  Negative for discharge, double vision, pain and photophobia.   Cardiovascular:  Negative for chest pain, claudication, cyanosis, dyspnea on exertion, irregular heartbeat, leg swelling, near-syncope, orthopnea, palpitations, paroxysmal nocturnal dyspnea and syncope.   Respiratory:  Negative for cough, hemoptysis, sputum production and wheezing.    Endocrine: Negative for cold intolerance, heat intolerance, polydipsia, polyphagia and polyuria.   Hematologic/Lymphatic: Negative for adenopathy and bleeding problem. Does not bruise/bleed easily.   Skin:  Negative for color change, flushing, itching and rash.   Musculoskeletal:  Negative for muscle cramps, muscle weakness, myalgias and stiffness.   Gastrointestinal:  Negative for abdominal pain, diarrhea, hematemesis, hematochezia, nausea and vomiting.   Genitourinary:  Negative for dysuria, frequency and nocturia.   Neurological:  Negative for focal weakness, loss of balance, numbness, paresthesias and seizures.   Psychiatric/Behavioral:  Negative for altered mental status, hallucinations and suicidal  ideas.    Allergic/Immunologic: Negative for HIV exposure, hives and persistent infections.           Current Outpatient Medications:     atorvastatin (LIPITOR) 80 MG tablet, Take 1 tablet by mouth Every Night., Disp: 90 tablet, Rfl: 1    Cholecalciferol (VITAMIN D-3) 1000 UNITS capsule, Take 1,000 Units by mouth Daily., Disp: , Rfl:     diphenhydrAMINE (BENADRYL) 25 MG tablet, Take 1 tablet by mouth Every 6 (Six) Hours As Needed for Itching., Disp: 30 tablet, Rfl: 0    hydrocortisone 2.5 % cream, Apply 1 Application topically to the appropriate area as directed 2 (Two) Times a Day., Disp: , Rfl:     losartan-hydrochlorothiazide (HYZAAR) 50-12.5 MG per tablet, Take 0.5 tablets by mouth Daily., Disp: 45 tablet, Rfl: 2    meclizine (ANTIVERT) 25 MG tablet, Take 1 tablet by mouth 3 (Three) Times a Day As Needed for Dizziness., Disp: 30 tablet, Rfl: 4    metoprolol succinate XL (TOPROL-XL) 50 MG 24 hr tablet, Take 1 tablet by mouth Daily., Disp: 90 tablet, Rfl: 1    prasugrel (EFFIENT) 10 MG tablet, Take 1 tablet by mouth Daily., Disp: 90 tablet, Rfl: 3    vitamin B-12 (CYANOCOBALAMIN) 1000 MCG tablet, Take 1 tablet by mouth Daily., Disp: , Rfl:     Objective:   Vitals and nursing note reviewed.   Constitutional:       Appearance: Healthy appearance. Not in distress.   Neck:      Vascular: No JVR. JVD normal.   Pulmonary:      Effort: Pulmonary effort is normal.      Breath sounds: Normal breath sounds. No wheezing. No rhonchi. No rales.   Chest:      Chest wall: Not tender to palpatation.   Cardiovascular:      PMI at left midclavicular line. Normal rate. Regular rhythm. Normal S1. Normal S2.       Murmurs: There is no murmur.      No gallop.  No click. No rub.   Pulses:     Intact distal pulses.   Edema:     Peripheral edema absent.   Abdominal:      General: Bowel sounds are normal.      Palpations: Abdomen is soft.      Tenderness: There is no abdominal tenderness.   Musculoskeletal: Normal range of motion.          "General: No tenderness. Skin:     General: Skin is warm and dry.   Neurological:      General: No focal deficit present.      Mental Status: Alert and oriented to person, place and time.       Blood pressure 124/74, pulse 76, height 170.2 cm (67\"), weight 101 kg (222 lb 3.2 oz), SpO2 97%.   Lab Review:     Assessment:       1. Coronary artery disease involving native coronary artery of native heart without angina pectoris  Prior LAD stenting.  No recurrent angina.  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    2. Benign essential hypertension  Acceptable blood pressure control.    3. Dyslipidemia  Tolerating statin based cholesterol-lowering therapy without side effects.    4. Obesity (BMI 30.0-34.9)  Central obesity pattern.  This is due to excess calorie intake.    Procedures    Plan:     No changes in medications made at today's visit.    No cardiovascular testing warranted at this time.      "

## 2025-03-03 RX ORDER — PRASUGREL 10 MG/1
10 TABLET, FILM COATED ORAL DAILY
Qty: 90 TABLET | Refills: 0 | Status: SHIPPED | OUTPATIENT
Start: 2025-03-03

## 2025-06-02 RX ORDER — PRASUGREL 10 MG/1
10 TABLET, FILM COATED ORAL DAILY
Qty: 90 TABLET | Refills: 3 | Status: SHIPPED | OUTPATIENT
Start: 2025-06-02

## 2025-06-03 DIAGNOSIS — I10 BENIGN ESSENTIAL HYPERTENSION: ICD-10-CM

## 2025-06-03 RX ORDER — LOSARTAN POTASSIUM AND HYDROCHLOROTHIAZIDE 12.5; 5 MG/1; MG/1
0.5 TABLET ORAL DAILY
Qty: 45 TABLET | Refills: 2 | Status: SHIPPED | OUTPATIENT
Start: 2025-06-03

## 2025-06-20 LAB
ALBUMIN SERPL-MCNC: 4.5 G/DL (ref 3.5–5.2)
ALBUMIN/GLOB SERPL: 2.4 G/DL
ALP SERPL-CCNC: 92 U/L (ref 39–117)
ALT SERPL-CCNC: 29 U/L (ref 1–41)
AST SERPL-CCNC: 22 U/L (ref 1–40)
BILIRUB SERPL-MCNC: 1.1 MG/DL (ref 0–1.2)
BUN SERPL-MCNC: 14 MG/DL (ref 6–20)
BUN/CREAT SERPL: 14.6 (ref 7–25)
CALCIUM SERPL-MCNC: 9.2 MG/DL (ref 8.6–10.5)
CHLORIDE SERPL-SCNC: 105 MMOL/L (ref 98–107)
CHOLEST SERPL-MCNC: 118 MG/DL (ref 0–200)
CO2 SERPL-SCNC: 27.2 MMOL/L (ref 22–29)
CREAT SERPL-MCNC: 0.96 MG/DL (ref 0.76–1.27)
EGFRCR SERPLBLD CKD-EPI 2021: 96.9 ML/MIN/1.73
ERYTHROCYTE [DISTWIDTH] IN BLOOD BY AUTOMATED COUNT: 12.7 % (ref 12.3–15.4)
GLOBULIN SER CALC-MCNC: 1.9 GM/DL
GLUCOSE SERPL-MCNC: 116 MG/DL (ref 65–99)
HBA1C MFR BLD: 5.9 % (ref 4.8–5.6)
HCT VFR BLD AUTO: 42.9 % (ref 37.5–51)
HDLC SERPL-MCNC: 45 MG/DL (ref 40–60)
HGB BLD-MCNC: 14.7 G/DL (ref 13–17.7)
LDLC SERPL CALC-MCNC: 58 MG/DL (ref 0–100)
MCH RBC QN AUTO: 30.8 PG (ref 26.6–33)
MCHC RBC AUTO-ENTMCNC: 34.3 G/DL (ref 31.5–35.7)
MCV RBC AUTO: 89.7 FL (ref 79–97)
PLATELET # BLD AUTO: 222 10*3/MM3 (ref 140–450)
POTASSIUM SERPL-SCNC: 4.2 MMOL/L (ref 3.5–5.2)
PROT SERPL-MCNC: 6.4 G/DL (ref 6–8.5)
RBC # BLD AUTO: 4.78 10*6/MM3 (ref 4.14–5.8)
SODIUM SERPL-SCNC: 142 MMOL/L (ref 136–145)
TRIGL SERPL-MCNC: 75 MG/DL (ref 0–150)
VLDLC SERPL CALC-MCNC: 15 MG/DL (ref 5–40)
WBC # BLD AUTO: 7.17 10*3/MM3 (ref 3.4–10.8)

## 2025-06-26 ENCOUNTER — RESULTS FOLLOW-UP (OUTPATIENT)
Dept: INTERNAL MEDICINE | Facility: CLINIC | Age: 50
End: 2025-06-26
Payer: COMMERCIAL

## 2025-07-02 ENCOUNTER — OFFICE VISIT (OUTPATIENT)
Dept: INTERNAL MEDICINE | Facility: CLINIC | Age: 50
End: 2025-07-02
Payer: COMMERCIAL

## 2025-07-02 VITALS
WEIGHT: 224 LBS | TEMPERATURE: 97.6 F | HEIGHT: 67 IN | HEART RATE: 63 BPM | DIASTOLIC BLOOD PRESSURE: 70 MMHG | BODY MASS INDEX: 35.16 KG/M2 | OXYGEN SATURATION: 97 % | SYSTOLIC BLOOD PRESSURE: 116 MMHG

## 2025-07-02 DIAGNOSIS — R73.01 IMPAIRED FASTING GLUCOSE: ICD-10-CM

## 2025-07-02 DIAGNOSIS — Z12.11 COLON CANCER SCREENING: ICD-10-CM

## 2025-07-02 DIAGNOSIS — E78.2 MIXED HYPERLIPIDEMIA: ICD-10-CM

## 2025-07-02 DIAGNOSIS — I25.10 CORONARY ARTERY DISEASE INVOLVING NATIVE CORONARY ARTERY OF NATIVE HEART WITHOUT ANGINA PECTORIS: ICD-10-CM

## 2025-07-02 DIAGNOSIS — I10 BENIGN ESSENTIAL HYPERTENSION: Primary | ICD-10-CM

## 2025-07-02 RX ORDER — ATORVASTATIN CALCIUM 80 MG/1
80 TABLET, FILM COATED ORAL NIGHTLY
Qty: 90 TABLET | Refills: 1 | Status: SHIPPED | OUTPATIENT
Start: 2025-07-02

## 2025-07-02 RX ORDER — LOSARTAN POTASSIUM AND HYDROCHLOROTHIAZIDE 12.5; 5 MG/1; MG/1
0.5 TABLET ORAL DAILY
Qty: 45 TABLET | Refills: 2 | Status: SHIPPED | OUTPATIENT
Start: 2025-07-02

## 2025-07-02 RX ORDER — METOPROLOL SUCCINATE 50 MG/1
50 TABLET, EXTENDED RELEASE ORAL DAILY
Qty: 90 TABLET | Refills: 1 | Status: SHIPPED | OUTPATIENT
Start: 2025-07-02

## 2025-07-02 NOTE — PROGRESS NOTES
"Chief Complaint  Hypertension and Hyperlipidemia    Subjective        Zachery Lomeli presents to [facility]  HPI: Patient is here to follow up on the blood pressure  The patient is taking the blood pressure medications as prescribed and has had no side effects. The patient is also here to follow up on the cholesterol and is trying to follow a diet and had  lab work done .  The patient also is following up on CAD and was seen by cardiology and needs refills on medications .  He is also due for colonoscopy  Hyperlipidemia   Pertinent negatives include no chest pain or shortness of breath.   Hypertension   Pertinent negatives include no chest pain, palpitations or shortness of breath.      Objective   Vital Signs:  /70   Pulse 63   Temp 97.6 °F (36.4 °C)   Ht 170.2 cm (67.01\")   Wt 102 kg (224 lb)   SpO2 97%   BMI 35.07 kg/m²   Estimated body mass index is 35.07 kg/m² as calculated from the following:    Height as of this encounter: 170.2 cm (67.01\").    Weight as of this encounter: 102 kg (224 lb).    Class 2 Severe Obesity (BMI >=35 and <=39.9). Obesity-related health conditions include the following: hypertension and dyslipidemias. Obesity is unchanged. BMI is is above average; BMI management plan is completed. We discussed low calorie, low carb based diet program, portion control, increasing exercise, and joining a fitness center or start home based exercise program.      Physical Exam  Vitals and nursing note reviewed.   Constitutional:       General: He is not in acute distress.     Appearance: Normal appearance. He is not diaphoretic.   HENT:      Head: Normocephalic and atraumatic.      Right Ear: External ear normal.      Left Ear: External ear normal.      Nose: Nose normal.   Eyes:      Extraocular Movements: Extraocular movements intact.      Conjunctiva/sclera: Conjunctivae normal.   Neck:      Trachea: Trachea normal.   Cardiovascular:      Rate and Rhythm: Normal " rate and regular rhythm.      Heart sounds: Normal heart sounds.   Pulmonary:      Effort: Pulmonary effort is normal. No respiratory distress.   Abdominal:      General: Abdomen is flat.   Musculoskeletal:      Cervical back: Neck supple.      Comments: Moves all limbs   Skin:     General: Skin is warm and dry.      Findings: No erythema.   Neurological:      Mental Status: He is alert and oriented to person, place, and time.      Comments: No gross motor or sensory deficits        Result Review :  The following data was reviewed by: Keila Bolivar MD on 07/02/2025:  Common labs          7/25/2024    08:54 12/10/2024    08:09 6/19/2025    08:44   Common Labs   Glucose 114  104  116    BUN 12  14  14.0    Creatinine 1.01  1.02  0.96    Sodium 143  143  142    Potassium 4.1  4.2  4.2    Chloride 108  106  105    Calcium 9.4  9.4  9.2    Albumin 4.4  4.3  4.5    Total Bilirubin 0.7  0.8  1.1    Alkaline Phosphatase 84  90  92    AST (SGOT) 17  20  22    ALT (SGPT) 29  29  29    WBC 6.76  8.47  7.17    Hemoglobin 14.1  14.1  14.7    Hematocrit 41.1  43.3  42.9    Platelets 243  229  222    Total Cholesterol 136  126  118    Triglycerides 75  87  75    HDL Cholesterol 50  43  45    LDL Cholesterol  71  66  58    Hemoglobin A1C 5.80  5.50  5.90                Assessment and Plan   Diagnoses and all orders for this visit:    1. Benign essential hypertension (Primary)  -     losartan-hydrochlorothiazide (HYZAAR) 50-12.5 MG per tablet; Take 0.5 tablets by mouth Daily.  Dispense: 45 tablet; Refill: 2  -     metoprolol succinate XL (TOPROL-XL) 50 MG 24 hr tablet; Take 1 tablet by mouth Daily.  Dispense: 90 tablet; Refill: 1    2. Mixed hyperlipidemia  -     atorvastatin (LIPITOR) 80 MG tablet; Take 1 tablet by mouth Every Night.  Dispense: 90 tablet; Refill: 1  -     CBC (No Diff)  -     Comprehensive Metabolic Panel  -     Lipid Panel    3. Impaired fasting glucose  -     Hemoglobin A1c    4. Coronary artery disease involving  native coronary artery of native heart without angina pectoris  -     metoprolol succinate XL (TOPROL-XL) 50 MG 24 hr tablet; Take 1 tablet by mouth Daily.  Dispense: 90 tablet; Refill: 1  -     Comprehensive Metabolic Panel    5. Colon cancer screening  -     Ambulatory Referral to Gastroenterology        Plan:  1.  Benign essential hypertension: Will continue current medication, low-sodium diet advised, Counseled to regularly check BP at home with goal averaging <130/80.   2.mixed hyperlipidemia:  reviewed  fasting CMP and lipid panel.  Diet and exercise counseled,  Will continue current medications  3. impaired glucose   :   reviewed  fasting CMP  and hba1c  , diet and exercise counseled  4.  CAD: stable,seen by cardiology, labs reviewed, will refill medications  5.  Colon cancer screening will refer patient to GI for colonoscopy       Follow Up   Return in about 5 months (around 11/17/2025).  Patient was given instructions and counseling regarding his condition or for health maintenance advice. Please see specific information pulled into the AVS if appropriate.

## (undated) DEVICE — ASMBL SPK CONTRST CONTRL

## (undated) DEVICE — GW EMR FIX EXCHG J STD .035 3MM 260CM

## (undated) DEVICE — RICH GENERAL LAPAROSCOPY: Brand: MEDLINE INDUSTRIES, INC.

## (undated) DEVICE — PAD GRND REM POLYHESIVE A/ DISP

## (undated) DEVICE — ENDOGATOR AUXILIARY WATER JET CONNECTOR: Brand: ENDOGATOR

## (undated) DEVICE — FRCP BIOP COLD ENDOJAW ALLGTR W/NDL 2.8X2300MM BLU

## (undated) DEVICE — CONTRL CONTRST CHMBRD W/TBG72IN REUS

## (undated) DEVICE — 2, DISPOSABLE SUCTION/IRRIGATOR WITHOUT DISPOSABLE TIP: Brand: STRYKEFLOW

## (undated) DEVICE — ANOSCP PLSTC 9/10DX3 3/8IN

## (undated) DEVICE — INFLATION DEVICE KIT: Brand: ENCORE™ 26 ADVANTAGE KIT

## (undated) DEVICE — JELLY,LUBE,STERILE,FLIP TOP,TUBE,2-OZ: Brand: MEDLINE

## (undated) DEVICE — TREK CORONARY DILATATION CATHETER 2.50 MM X 20 MM / RAPID-EXCHANGE: Brand: TREK

## (undated) DEVICE — CVR PROB ULTRASND GLS STRL

## (undated) DEVICE — CATH F6 ST JR 4 100CM: Brand: SUPERTORQUE

## (undated) DEVICE — RADIFOCUS OPTITORQUE ANGIOGRAPHIC CATHETER: Brand: OPTITORQUE

## (undated) DEVICE — CATH F6INF TL AL III 100CM: Brand: INFINITI

## (undated) DEVICE — GW INQWIRE FC PTFE STD J/1.5 .035 260

## (undated) DEVICE — ENDOPATH XCEL UNIVERSAL TROCAR STABLILITY SLEEVES: Brand: ENDOPATH XCEL

## (undated) DEVICE — 12CC CONTROL SYRINGE – FR/TR/RA W/RESERVOIR: Brand: CONTROL SYRINGE

## (undated) DEVICE — RUNTHROUGH NS EXTRA FLOPPY PTCA GUIDEWIRE: Brand: RUNTHROUGH

## (undated) DEVICE — PINNACLE INTRODUCER SHEATH: Brand: PINNACLE

## (undated) DEVICE — CATH F6INF TL AR MOD 100CM: Brand: INFINITI

## (undated) DEVICE — GLV SURG SENSICARE W/ALOE PF LF 7.5 STRL

## (undated) DEVICE — MONOPOLAR METZENBAUM SCISSOR, MINI BLADE TIP, DISPOSABLE: Brand: MONOPOLAR METZENBAUM SCISSOR, MINI BLADE TIP, DISPOSABLE

## (undated) DEVICE — UNDYED MONOFILAMENT (POLYDIOXANONE), ABSORBABLE SYNTHETIC SURGICAL SUTURE: Brand: PDS

## (undated) DEVICE — TR BAND RADIAL ARTERY COMPRESSION DEVICE: Brand: TR BAND

## (undated) DEVICE — NC TREK CORONARY DILATATION CATHETER 4.0 MM X 20 MM / RAPID-EXCHANGE: Brand: NC TREK

## (undated) DEVICE — GLIDESHEATH SLENDER STAINLESS STEEL KIT: Brand: GLIDESHEATH SLENDER

## (undated) DEVICE — GUIDE CATHETER: Brand: MACH1™

## (undated) DEVICE — CATH DIAG EXPO .052 PIG145 6F 110CM

## (undated) DEVICE — Device: Brand: DEFENDO AIR/WATER/SUCTION AND BIOPSY VALVE

## (undated) DEVICE — SOL NACL 0.9PCT 1000ML

## (undated) DEVICE — Device: Brand: TISSUE RETRIEVAL SYSTEM

## (undated) DEVICE — CLAVICLE STRAP: Brand: DEROYAL

## (undated) DEVICE — Device

## (undated) DEVICE — ENDOPATH XCEL BLADELESS TROCARS WITH STABILITY SLEEVES: Brand: ENDOPATH XCEL

## (undated) DEVICE — DISPOSABLE MONOPOLAR ENDOSCOPIC CORD 10 FT. (3M): Brand: KIRWAN

## (undated) DEVICE — CATH F6INF TL AL I 100CM: Brand: INFINITI

## (undated) DEVICE — NDL ART WING 18G 7CM

## (undated) DEVICE — SLV SCD CALF HEMOFORCE DVT THERP REPROC MD

## (undated) DEVICE — SOL IRR NACL 0.9PCT BT 1000ML

## (undated) DEVICE — KT CATH CHOLANGIOGRA PERC W/BALLO

## (undated) DEVICE — CATH THERMODIL HEP 4L 7F 110CM

## (undated) DEVICE — TP ELECTRD LAP L WR SPLIT33CM

## (undated) DEVICE — ANGIO-SEAL VIP VASCULAR CLOSURE DEVICE: Brand: ANGIO-SEAL

## (undated) DEVICE — SYRINGE KIT,PACKAGED,,150FT,MK 7(ANGIO-ARTERION, 150ML SYR KIT W/QFT,MC)(60729385): Brand: MEDRAD® MARK 7 ARTERION DISPOSABLE SYRINGE 150 ML WITH QUICK FILL TUBE

## (undated) DEVICE — CATH F6INF TL AL II 100CM: Brand: INFINITI